# Patient Record
Sex: FEMALE | Race: WHITE | Employment: FULL TIME | URBAN - METROPOLITAN AREA
[De-identification: names, ages, dates, MRNs, and addresses within clinical notes are randomized per-mention and may not be internally consistent; named-entity substitution may affect disease eponyms.]

---

## 2017-01-02 ENCOUNTER — OFFICE VISIT (OUTPATIENT)
Dept: PHYSICAL THERAPY | Age: 32
End: 2017-01-02
Attending: FAMILY MEDICINE
Payer: MEDICAID

## 2017-01-02 DIAGNOSIS — M54.2 NECK PAIN: ICD-10-CM

## 2017-01-02 DIAGNOSIS — M54.50 LOW BACK PAIN AT MULTIPLE SITES: ICD-10-CM

## 2017-01-02 DIAGNOSIS — M62.830 MUSCLE SPASM OF BACK: ICD-10-CM

## 2017-01-02 DIAGNOSIS — S39.012A LOW BACK STRAIN, INITIAL ENCOUNTER: Primary | ICD-10-CM

## 2017-01-02 PROCEDURE — 97163 PT EVAL HIGH COMPLEX 45 MIN: CPT

## 2017-01-02 PROCEDURE — 97110 THERAPEUTIC EXERCISES: CPT

## 2017-01-02 NOTE — PROGRESS NOTES
SPINE EVALUATION:   Referring Physician: Dr. Emma Elizabeth  Diagnosis: Low Back Pain     Date of Service: 1/2/2017     PATIENT SUMMARY   Damaris Ahumada is a 33year old y/o female who presents to therapy today with complaints of occasional cervical and and lumbar spine. Experiences a contralateral pulling sensation for end range sidebending and rotation of both the cervical and lumbar motion. Accessory motion: Good cervical and lumbar motion. Mild loss of thoracic mobility with PA assessment. FAAOMPT

## 2017-01-04 ENCOUNTER — OFFICE VISIT (OUTPATIENT)
Dept: FAMILY MEDICINE CLINIC | Facility: CLINIC | Age: 32
End: 2017-01-04

## 2017-01-04 VITALS
DIASTOLIC BLOOD PRESSURE: 74 MMHG | SYSTOLIC BLOOD PRESSURE: 118 MMHG | RESPIRATION RATE: 16 BRPM | HEIGHT: 62 IN | WEIGHT: 213 LBS | BODY MASS INDEX: 39.2 KG/M2 | HEART RATE: 76 BPM | TEMPERATURE: 98 F

## 2017-01-04 DIAGNOSIS — B96.89 ACUTE BACTERIAL PHARYNGITIS: ICD-10-CM

## 2017-01-04 DIAGNOSIS — H92.03 EAR PAIN, BILATERAL: ICD-10-CM

## 2017-01-04 DIAGNOSIS — J02.8 ACUTE BACTERIAL PHARYNGITIS: ICD-10-CM

## 2017-01-04 DIAGNOSIS — J01.00 ACUTE NON-RECURRENT MAXILLARY SINUSITIS: Primary | ICD-10-CM

## 2017-01-04 PROCEDURE — 99213 OFFICE O/P EST LOW 20 MIN: CPT | Performed by: FAMILY MEDICINE

## 2017-01-04 RX ORDER — CIPROFLOXACIN 250 MG/1
250 TABLET, FILM COATED ORAL 2 TIMES DAILY
Qty: 20 TABLET | Refills: 0 | Status: SHIPPED | OUTPATIENT
Start: 2017-01-04 | End: 2017-01-14

## 2017-01-04 NOTE — PATIENT INSTRUCTIONS
Stacie Santos you were seen for acute sinus and throat infections--start the Cipro twice a day for ten days and use supportive products as well. Rest and hydration and treating other symptoms advised. See me if not better in 2 weeks.  Take care, Dr. Madhu Shah · Over-the-counter decongestants may be used unless a similar medicine was prescribed. Nasal sprays work the fastest. Use one that contains phenylephrine or oxymetazoline. First blow the nose gently. Then use the spray.  Do not use these medicines more ofte © 3163-2095 [address], [address] [place] [place]. All rights reserved. This information is not intended as a substitute for professional medical care. Always follow your healthcare professional's instructions.

## 2017-01-04 NOTE — PROGRESS NOTES
HPI:   Mindy Souza is a 33year old female who presents for sick visit with increasing upper respiratory symptoms for three days with very sore throat, PND, cough, sinus pressure and ear pains. No fevers. Here for evaluation and treatment.       C optic disk,conjunctiva are clear  HEENT: atraumatic, normocephalic, TMs not erythematous and bulging bilateral due to mild Eustachian tube dysfunction; exudative moderate pharyngeal erythema, halitosis, bilateral moderate maxillary sinus pressure on palpat

## 2017-01-09 ENCOUNTER — APPOINTMENT (OUTPATIENT)
Dept: PHYSICAL THERAPY | Age: 32
End: 2017-01-09
Attending: FAMILY MEDICINE
Payer: MEDICAID

## 2017-01-10 ENCOUNTER — APPOINTMENT (OUTPATIENT)
Dept: PHYSICAL THERAPY | Age: 32
End: 2017-01-10
Attending: FAMILY MEDICINE
Payer: MEDICAID

## 2017-01-13 ENCOUNTER — APPOINTMENT (OUTPATIENT)
Dept: PHYSICAL THERAPY | Age: 32
End: 2017-01-13
Attending: FAMILY MEDICINE
Payer: MEDICAID

## 2017-01-16 ENCOUNTER — OFFICE VISIT (OUTPATIENT)
Dept: PHYSICAL THERAPY | Age: 32
End: 2017-01-16
Attending: FAMILY MEDICINE
Payer: MEDICAID

## 2017-01-16 PROCEDURE — 97110 THERAPEUTIC EXERCISES: CPT

## 2017-01-16 NOTE — PROGRESS NOTES
Dx: Lumbar Strain         Authorized # of Visits:  7         Next MD visit: none scheduled  Fall Risk: standard         Precautions: n/a             Subjective: Feeling great.   Has returned to exercising 4-5 times a week with her  with mini

## 2017-01-17 ENCOUNTER — OFFICE VISIT (OUTPATIENT)
Dept: FAMILY MEDICINE CLINIC | Facility: CLINIC | Age: 32
End: 2017-01-17

## 2017-01-17 VITALS
TEMPERATURE: 99 F | BODY MASS INDEX: 38.83 KG/M2 | SYSTOLIC BLOOD PRESSURE: 116 MMHG | HEART RATE: 76 BPM | WEIGHT: 211 LBS | DIASTOLIC BLOOD PRESSURE: 78 MMHG | RESPIRATION RATE: 16 BRPM | HEIGHT: 62 IN

## 2017-01-17 DIAGNOSIS — J02.8 ACUTE BACTERIAL PHARYNGITIS: ICD-10-CM

## 2017-01-17 DIAGNOSIS — R42 VERTIGO: Primary | ICD-10-CM

## 2017-01-17 DIAGNOSIS — B96.89 ACUTE BACTERIAL PHARYNGITIS: ICD-10-CM

## 2017-01-17 DIAGNOSIS — Z09 FOLLOW UP: ICD-10-CM

## 2017-01-17 DIAGNOSIS — J30.89 SEASONAL ALLERGIC RHINITIS DUE TO OTHER ALLERGIC TRIGGER: ICD-10-CM

## 2017-01-17 DIAGNOSIS — J01.00 ACUTE NON-RECURRENT MAXILLARY SINUSITIS: ICD-10-CM

## 2017-01-17 DIAGNOSIS — R63.4 WEIGHT LOSS: ICD-10-CM

## 2017-01-17 PROCEDURE — 99213 OFFICE O/P EST LOW 20 MIN: CPT | Performed by: FAMILY MEDICINE

## 2017-01-17 NOTE — PATIENT INSTRUCTIONS
Jean Pierre Ceron you were seen for dizziness probably due to weight loss and allergies. Keep hydrated and stay on low salt. Get on plain Claritin or ALlegra or Zyrtec. Get labs done as well and our office will call you with results.   Take care, Dr. Shari Ponce Follow up with your healthcare provider or as directed. If you are referred to a specialist or for testing, make the appointment promptly.   When to seek medical advice  Call your healthcare provider if any of the following occur:  · Fever of 100.4°F (38ºC)

## 2017-01-17 NOTE — PROGRESS NOTES
Leonor returns for follow up on recently treated sinus infection and states she still feels tired and drained lately; had a few episodes of dizziness with driving; had some right ear pain stabbing yesterday and better today; admits she has had many episo had dizziness and some vertigo the past week  LUNGS: denies shortness of breath with exertion  CARDIOVASCULAR: denies chest pain on exertion  GI: no nausea or abdominal pain  NEURO: denies headaches--no migraines   PSYCHE: no anxiety     EXAM:   /78

## 2017-01-20 ENCOUNTER — LAB ENCOUNTER (OUTPATIENT)
Dept: LAB | Age: 32
End: 2017-01-20
Attending: FAMILY MEDICINE
Payer: MEDICAID

## 2017-01-20 DIAGNOSIS — R42 VERTIGO: ICD-10-CM

## 2017-01-20 LAB
ALBUMIN SERPL-MCNC: 4.3 G/DL (ref 3.5–4.8)
ALP LIVER SERPL-CCNC: 85 U/L (ref 37–98)
ALT SERPL-CCNC: 29 U/L (ref 14–54)
AST SERPL-CCNC: 23 U/L (ref 15–41)
BASOPHILS # BLD AUTO: 0.06 X10(3) UL (ref 0–0.1)
BASOPHILS NFR BLD AUTO: 0.6 %
BILIRUB SERPL-MCNC: 0.7 MG/DL (ref 0.1–2)
BUN BLD-MCNC: 10 MG/DL (ref 8–20)
CALCIUM BLD-MCNC: 9.6 MG/DL (ref 8.3–10.3)
CHLORIDE: 109 MMOL/L (ref 101–111)
CO2: 24 MMOL/L (ref 22–32)
CREAT BLD-MCNC: 0.85 MG/DL (ref 0.55–1.02)
EOSINOPHIL # BLD AUTO: 0.2 X10(3) UL (ref 0–0.3)
EOSINOPHIL NFR BLD AUTO: 2.1 %
ERYTHROCYTE [DISTWIDTH] IN BLOOD BY AUTOMATED COUNT: 12.8 % (ref 11.5–16)
FREE T4: 1.4 NG/DL (ref 0.9–1.8)
GLUCOSE BLD-MCNC: 90 MG/DL (ref 70–99)
HCT VFR BLD AUTO: 42.5 % (ref 34–50)
HGB BLD-MCNC: 15.1 G/DL (ref 12–16)
IMMATURE GRANULOCYTE COUNT: 0.02 X10(3) UL (ref 0–1)
IMMATURE GRANULOCYTE RATIO %: 0.2 %
LYMPHOCYTES # BLD AUTO: 1.94 X10(3) UL (ref 0.9–4)
LYMPHOCYTES NFR BLD AUTO: 20.4 %
M PROTEIN MFR SERPL ELPH: 7.4 G/DL (ref 6.1–8.3)
MCH RBC QN AUTO: 28.2 PG (ref 27–33.2)
MCHC RBC AUTO-ENTMCNC: 35.5 G/DL (ref 31–37)
MCV RBC AUTO: 79.3 FL (ref 81–100)
MONOCYTES # BLD AUTO: 0.5 X10(3) UL (ref 0.1–0.6)
MONOCYTES NFR BLD AUTO: 5.3 %
NEUTROPHIL ABS PRELIM: 6.8 X10 (3) UL (ref 1.3–6.7)
NEUTROPHILS # BLD AUTO: 6.8 X10(3) UL (ref 1.3–6.7)
NEUTROPHILS NFR BLD AUTO: 71.4 %
PLATELET # BLD AUTO: 286 10(3)UL (ref 150–450)
POTASSIUM SERPL-SCNC: 3.6 MMOL/L (ref 3.6–5.1)
RBC # BLD AUTO: 5.36 X10(6)UL (ref 3.8–5.1)
RED CELL DISTRIBUTION WIDTH-SD: 36.4 FL (ref 35.1–46.3)
SODIUM SERPL-SCNC: 140 MMOL/L (ref 136–144)
TSI SER-ACNC: 1.88 MIU/ML (ref 0.35–5.5)
WBC # BLD AUTO: 9.5 X10(3) UL (ref 4–13)

## 2017-01-20 PROCEDURE — 84439 ASSAY OF FREE THYROXINE: CPT

## 2017-01-20 PROCEDURE — 85025 COMPLETE CBC W/AUTO DIFF WBC: CPT

## 2017-01-20 PROCEDURE — 36415 COLL VENOUS BLD VENIPUNCTURE: CPT

## 2017-01-20 PROCEDURE — 80053 COMPREHEN METABOLIC PANEL: CPT

## 2017-01-20 PROCEDURE — 84443 ASSAY THYROID STIM HORMONE: CPT

## 2017-01-20 NOTE — PROGRESS NOTES
Quick Note:    Please call pt with normal results for her CBC and CMP and thyroid labs ordered to assess her dizziness--her symptoms are likely due to allergies and weight loss--advise she treat allergies and stay hydrated as discussed at [address]--- Dr. Herminia La

## 2017-01-23 ENCOUNTER — APPOINTMENT (OUTPATIENT)
Dept: PHYSICAL THERAPY | Age: 32
End: 2017-01-23
Attending: FAMILY MEDICINE
Payer: MEDICAID

## 2017-01-25 ENCOUNTER — TELEPHONE (OUTPATIENT)
Dept: FAMILY MEDICINE CLINIC | Facility: CLINIC | Age: 32
End: 2017-01-25

## 2017-01-25 NOTE — TELEPHONE ENCOUNTER
Patient given normal results-read message from Dr. Rudy Kasper to patient. Patient verbalized understanding.

## 2017-02-06 ENCOUNTER — OFFICE VISIT (OUTPATIENT)
Dept: FAMILY MEDICINE CLINIC | Facility: CLINIC | Age: 32
End: 2017-02-06

## 2017-02-06 VITALS
HEART RATE: 103 BPM | BODY MASS INDEX: 38 KG/M2 | OXYGEN SATURATION: 99 % | WEIGHT: 208 LBS | SYSTOLIC BLOOD PRESSURE: 118 MMHG | RESPIRATION RATE: 14 BRPM | TEMPERATURE: 98 F | DIASTOLIC BLOOD PRESSURE: 60 MMHG

## 2017-02-06 DIAGNOSIS — H60.501 ACUTE OTITIS EXTERNA OF RIGHT EAR, UNSPECIFIED TYPE: ICD-10-CM

## 2017-02-06 DIAGNOSIS — R42 DIZZINESS: Primary | ICD-10-CM

## 2017-02-06 PROCEDURE — 99214 OFFICE O/P EST MOD 30 MIN: CPT | Performed by: FAMILY MEDICINE

## 2017-02-06 NOTE — PROGRESS NOTES
HPI:    Patient ID: Marybeth Moody is a 33year old female. HPI  Patient is here with a sense of fullness in the right ear and having some issues with dizziness off and on for the past several days. No cold symptoms. No ringing in the ears.   No diagnosis)  Acute otitis externa of right ear, unspecified type    Trial of Cortisporin Otic solution to the right ear and left ear if needed. Trial of Sudafed twice a day for dizziness or lightheadedness as needed. Follow-up in 3 days.     No orders of t

## 2017-02-09 ENCOUNTER — OFFICE VISIT (OUTPATIENT)
Dept: FAMILY MEDICINE CLINIC | Facility: CLINIC | Age: 32
End: 2017-02-09

## 2017-02-09 VITALS
HEIGHT: 62 IN | BODY MASS INDEX: 38.28 KG/M2 | DIASTOLIC BLOOD PRESSURE: 60 MMHG | SYSTOLIC BLOOD PRESSURE: 118 MMHG | WEIGHT: 208 LBS | TEMPERATURE: 98 F

## 2017-02-09 DIAGNOSIS — R42 DIZZINESS: Primary | ICD-10-CM

## 2017-02-09 DIAGNOSIS — H60.501 ACUTE OTITIS EXTERNA OF RIGHT EAR, UNSPECIFIED TYPE: ICD-10-CM

## 2017-02-09 PROCEDURE — 99214 OFFICE O/P EST MOD 30 MIN: CPT | Performed by: FAMILY MEDICINE

## 2017-02-09 RX ORDER — AZITHROMYCIN 250 MG/1
TABLET, FILM COATED ORAL
Qty: 6 TABLET | Refills: 0 | Status: SHIPPED | OUTPATIENT
Start: 2017-02-09 | End: 2017-02-18

## 2017-02-09 NOTE — PROGRESS NOTES
HPI:    Patient ID: Katarzyna Lawrence is a 33year old female. HPI  Patient is here for follow-up of right ear pain and dizziness. She is not much better. She has taken eardrops for the past 2 days.   He does have some shooting pain in the right ea and oriented to time, space, and person, cranial nerves two through twelve grossly intact, two plus deep tendon reflexes in all four extremities, gait is normal, negative Romberg sign, coordination with finger to nose and heel to shin intact.  No focal neur

## 2017-02-14 ENCOUNTER — HOSPITAL ENCOUNTER (EMERGENCY)
Age: 32
Discharge: HOME | End: 2017-02-14
Attending: EMERGENCY MEDICINE
Payer: MEDICAID

## 2017-02-14 VITALS
SYSTOLIC BLOOD PRESSURE: 112 MMHG | BODY MASS INDEX: 37.91 KG/M2 | HEIGHT: 62 IN | TEMPERATURE: 98 F | RESPIRATION RATE: 16 BRPM | OXYGEN SATURATION: 97 % | HEART RATE: 80 BPM | DIASTOLIC BLOOD PRESSURE: 72 MMHG | WEIGHT: 206 LBS

## 2017-02-14 DIAGNOSIS — N93.9 VAGINAL BLEEDING: Primary | ICD-10-CM

## 2017-02-14 LAB
BILIRUB UR QL STRIP.AUTO: NEGATIVE
CLARITY UR REFRACT.AUTO: CLEAR
COLOR UR AUTO: YELLOW
GLUCOSE UR STRIP.AUTO-MCNC: NEGATIVE MG/DL
KETONES UR STRIP.AUTO-MCNC: NEGATIVE MG/DL
LEUKOCYTE ESTERASE UR QL STRIP.AUTO: NEGATIVE
NITRITE UR QL STRIP.AUTO: NEGATIVE
PH UR STRIP.AUTO: 5.5 [PH] (ref 4.5–8)
POCT LOT NUMBER: NORMAL
POCT URINE PREGNANCY: NEGATIVE
PROT UR STRIP.AUTO-MCNC: NEGATIVE MG/DL
SP GR UR STRIP.AUTO: 1.01 (ref 1–1.03)
UROBILINOGEN UR STRIP.AUTO-MCNC: 0.2 MG/DL

## 2017-02-14 PROCEDURE — 99283 EMERGENCY DEPT VISIT LOW MDM: CPT

## 2017-02-14 PROCEDURE — 81001 URINALYSIS AUTO W/SCOPE: CPT | Performed by: EMERGENCY MEDICINE

## 2017-02-14 PROCEDURE — 81025 URINE PREGNANCY TEST: CPT

## 2017-02-14 RX ORDER — IBUPROFEN 600 MG/1
600 TABLET ORAL ONCE
Status: COMPLETED | OUTPATIENT
Start: 2017-02-14 | End: 2017-02-14

## 2017-02-14 RX ORDER — IBUPROFEN 600 MG/1
600 TABLET ORAL EVERY 8 HOURS PRN
Qty: 30 TABLET | Refills: 0 | Status: SHIPPED | OUTPATIENT
Start: 2017-02-14 | End: 2017-02-24

## 2017-02-14 NOTE — ED PROVIDER NOTES
Patient Seen in: [facility] [place]    History   Patient presents with:  Eval-G (gynecologic)    Stated Complaint: GOT DEPO SHOT THIS AM, SINCE THEN WITH VAG BLEEDING AND BACK PAIN    HPI    58-year-old female presents emergency de SINCE THEN WITH VAG BLEEDING AND BACK PAIN  Other systems are as noted in HPI. Constitutional and vital signs reviewed. All other systems reviewed and negative except as noted above.     PSFH elements reviewed from today and agreed except as otherwise She has been told that she needs to go to an urgent care or her primary for these complaints but she continues to come to this ER. Told her we will go ahead and check a UA and a pregnancy. We will give her some ibuprofen.   Do not feel this warrants emerg

## 2017-02-14 NOTE — ED INITIAL ASSESSMENT (HPI)
Pt states today she received depo shot and about 45mins later began having heavy vag bleeding with nausea, abd and back pain.

## 2017-02-15 NOTE — ED NOTES
Upon discharging pt, pt states to RN, \"I am very disappointing in the care I got today. You're in here telling me I'm going home, and I have received nothing for my pain. Just ibuprofen, and I could have taken that at home.  My OB sent me here for tests, a

## 2017-02-16 ENCOUNTER — TELEPHONE (OUTPATIENT)
Dept: FAMILY MEDICINE CLINIC | Facility: CLINIC | Age: 32
End: 2017-02-16

## 2017-02-16 ENCOUNTER — OFFICE VISIT (OUTPATIENT)
Dept: FAMILY MEDICINE CLINIC | Facility: CLINIC | Age: 32
End: 2017-02-16

## 2017-02-16 VITALS
DIASTOLIC BLOOD PRESSURE: 60 MMHG | OXYGEN SATURATION: 99 % | TEMPERATURE: 98 F | RESPIRATION RATE: 12 BRPM | HEART RATE: 98 BPM | SYSTOLIC BLOOD PRESSURE: 112 MMHG

## 2017-02-16 DIAGNOSIS — H92.03 OTALGIA, BILATERAL: Primary | ICD-10-CM

## 2017-02-16 PROCEDURE — 99213 OFFICE O/P EST LOW 20 MIN: CPT | Performed by: FAMILY MEDICINE

## 2017-02-17 ENCOUNTER — TELEPHONE (OUTPATIENT)
Dept: FAMILY MEDICINE CLINIC | Facility: CLINIC | Age: 32
End: 2017-02-17

## 2017-02-17 NOTE — TELEPHONE ENCOUNTER
Pt informed she needs to come in to see dr. Haresh Schroeder to get evaluated for abdominal pain and low back pain. Pt instructed to not go to ER unless it is an absolute emergency. appt scheduled for tomorrow.   Future Appointments  Date Time Provider Department Chen

## 2017-02-18 ENCOUNTER — OFFICE VISIT (OUTPATIENT)
Dept: FAMILY MEDICINE CLINIC | Facility: CLINIC | Age: 32
End: 2017-02-18

## 2017-02-18 VITALS
DIASTOLIC BLOOD PRESSURE: 60 MMHG | WEIGHT: 204.5 LBS | HEART RATE: 95 BPM | BODY MASS INDEX: 37 KG/M2 | OXYGEN SATURATION: 99 % | TEMPERATURE: 98 F | SYSTOLIC BLOOD PRESSURE: 122 MMHG | RESPIRATION RATE: 12 BRPM

## 2017-02-18 DIAGNOSIS — R10.2 PELVIC PAIN: Primary | ICD-10-CM

## 2017-02-18 DIAGNOSIS — R10.84 GENERALIZED ABDOMINAL PAIN: ICD-10-CM

## 2017-02-18 DIAGNOSIS — J34.89 SINUS PRESSURE: ICD-10-CM

## 2017-02-18 PROCEDURE — 99214 OFFICE O/P EST MOD 30 MIN: CPT | Performed by: FAMILY MEDICINE

## 2017-02-18 RX ORDER — ONDANSETRON 4 MG/1
4 TABLET, FILM COATED ORAL 3 TIMES DAILY PRN
Qty: 10 TABLET | Refills: 0 | Status: SHIPPED | OUTPATIENT
Start: 2017-02-18 | End: 2017-05-23

## 2017-02-18 RX ORDER — ESOMEPRAZOLE MAGNESIUM 40 MG/1
40 CAPSULE, DELAYED RELEASE ORAL DAILY
Qty: 30 CAPSULE | Refills: 0 | Status: SHIPPED | OUTPATIENT
Start: 2017-02-18 | End: 2017-05-23

## 2017-02-18 NOTE — PROGRESS NOTES
HPI:    Patient ID: Nallely Maldonado is a 33year old female. HPI  Had some mild sinus pressure, ear congestion since the last several days. Had depo provera 5 days ago, then developed excess vaginal bleeding, a lot of nausea.    Pt had been on it prescriptions requested or ordered in this encounter    Imaging & Referrals:  None       BX#[id number]

## 2017-02-18 NOTE — PROGRESS NOTES
HPI:    Patient ID: Brandon Garces is a 33year old female. HPI  Patient presents with:  Ear Problem: follow up on fluid in ears    Patient states that her ears are getting somewhat better.   Review of Systems  Negative except for the above This Visit:  No prescriptions requested or ordered in this encounter    Imaging & Referrals:  None       #[id number]

## 2017-02-21 ENCOUNTER — HOSPITAL ENCOUNTER (OUTPATIENT)
Dept: ULTRASOUND IMAGING | Age: 32
Discharge: HOME | End: 2017-02-21
Attending: FAMILY MEDICINE
Payer: MEDICAID

## 2017-02-21 ENCOUNTER — TELEPHONE (OUTPATIENT)
Dept: FAMILY MEDICINE CLINIC | Facility: CLINIC | Age: 32
End: 2017-02-21

## 2017-02-21 DIAGNOSIS — R10.84 GENERALIZED ABDOMINAL PAIN: ICD-10-CM

## 2017-02-21 DIAGNOSIS — R10.2 PELVIC PAIN: ICD-10-CM

## 2017-02-21 PROCEDURE — 76830 TRANSVAGINAL US NON-OB: CPT

## 2017-02-21 PROCEDURE — 76700 US EXAM ABDOM COMPLETE: CPT

## 2017-02-21 PROCEDURE — 76856 US EXAM PELVIC COMPLETE: CPT

## 2017-02-22 NOTE — TELEPHONE ENCOUNTER
Pt informed pa denied. She may take nexium 20mg OTC. Pt verbalized stating she cannot afford to \"spend her money\" on OTC meds.   She will contact insurance regarding denial.

## 2017-02-22 NOTE — TELEPHONE ENCOUNTER
PA request for nexium denied. Medication is available OTC or patient can try preferred drug: Nexium 20mg (QD), protonix, prilosec tab  Left detailed message on pt's vm letting her know the above information.   Please advise on other alternative medication

## 2017-02-24 ENCOUNTER — OFFICE VISIT (OUTPATIENT)
Dept: FAMILY MEDICINE CLINIC | Facility: CLINIC | Age: 32
End: 2017-02-24

## 2017-02-24 VITALS
OXYGEN SATURATION: 99 % | SYSTOLIC BLOOD PRESSURE: 102 MMHG | DIASTOLIC BLOOD PRESSURE: 60 MMHG | HEIGHT: 62 IN | WEIGHT: 204 LBS | HEART RATE: 88 BPM | BODY MASS INDEX: 37.54 KG/M2 | TEMPERATURE: 98 F | RESPIRATION RATE: 12 BRPM

## 2017-02-24 DIAGNOSIS — N83.201 CYST OF RIGHT OVARY: ICD-10-CM

## 2017-02-24 DIAGNOSIS — J01.10 ACUTE NON-RECURRENT FRONTAL SINUSITIS: Primary | ICD-10-CM

## 2017-02-24 DIAGNOSIS — K76.0 FATTY LIVER: ICD-10-CM

## 2017-02-24 DIAGNOSIS — G43.701 CHRONIC MIGRAINE WITHOUT AURA WITH STATUS MIGRAINOSUS, NOT INTRACTABLE: ICD-10-CM

## 2017-02-24 DIAGNOSIS — R11.0 NAUSEA: ICD-10-CM

## 2017-02-24 PROCEDURE — 99214 OFFICE O/P EST MOD 30 MIN: CPT | Performed by: FAMILY MEDICINE

## 2017-02-24 RX ORDER — BUTALBITAL, ACETAMINOPHEN AND CAFFEINE 50; 325; 40 MG/1; MG/1; MG/1
1 TABLET ORAL 3 TIMES DAILY PRN
Qty: 20 TABLET | Refills: 0 | Status: SHIPPED | OUTPATIENT
Start: 2017-02-24 | End: 2017-04-03

## 2017-02-24 RX ORDER — CIPROFLOXACIN 500 MG/1
500 TABLET, FILM COATED ORAL 2 TIMES DAILY
Qty: 14 TABLET | Refills: 0 | Status: SHIPPED | OUTPATIENT
Start: 2017-02-24 | End: 2017-03-02

## 2017-02-27 NOTE — PROGRESS NOTES
HPI:    Patient ID: Tulio oRbles is a 33year old female. HPI  Patient presents with:  Ear Pain: follow up  Lab Results  Test Results   patient is here for follow-up of test results.   She also complains of having sinus pain pressure congestion non-distended, no hepatosplenomegaly, no abnormal aortic pulsation.    NEURO: strength 5/5 all four extremities, sensation intact in all four extremities, cranial nerves II - XII grossly intact, negative Romberg sign, no facial weakness noted, coordination

## 2017-03-02 ENCOUNTER — OFFICE VISIT (OUTPATIENT)
Dept: FAMILY MEDICINE CLINIC | Facility: CLINIC | Age: 32
End: 2017-03-02

## 2017-03-02 VITALS
WEIGHT: 204 LBS | RESPIRATION RATE: 12 BRPM | OXYGEN SATURATION: 99 % | TEMPERATURE: 98 F | SYSTOLIC BLOOD PRESSURE: 114 MMHG | DIASTOLIC BLOOD PRESSURE: 68 MMHG | HEART RATE: 78 BPM | BODY MASS INDEX: 37 KG/M2

## 2017-03-02 DIAGNOSIS — G43.701 CHRONIC MIGRAINE WITHOUT AURA WITH STATUS MIGRAINOSUS, NOT INTRACTABLE: Primary | ICD-10-CM

## 2017-03-02 DIAGNOSIS — J01.00 ACUTE NON-RECURRENT MAXILLARY SINUSITIS: ICD-10-CM

## 2017-03-02 PROCEDURE — 99214 OFFICE O/P EST MOD 30 MIN: CPT | Performed by: FAMILY MEDICINE

## 2017-03-02 NOTE — PROGRESS NOTES
HPI:    Patient ID: Brie Rea is a 33year old female. HPI  Patient is here for follow-up of sinusitis and chronic migraine headache. She has used Fioricet about 3 times and it does seem to help with her migraine headache.   She thinks she i abnormal aortic pulsation. MS:  No paraspinal or spinal tenderness, negative straight leg raise bilaterally, no joint swelling or tenderness, normal strength, range of motion and sensation in all four extremities.   Neurologic:  alert and oriented to time,

## 2017-03-10 ENCOUNTER — OFFICE VISIT (OUTPATIENT)
Dept: FAMILY MEDICINE CLINIC | Facility: CLINIC | Age: 32
End: 2017-03-10

## 2017-03-10 VITALS
SYSTOLIC BLOOD PRESSURE: 118 MMHG | HEART RATE: 84 BPM | OXYGEN SATURATION: 99 % | RESPIRATION RATE: 12 BRPM | DIASTOLIC BLOOD PRESSURE: 60 MMHG | TEMPERATURE: 98 F

## 2017-03-10 DIAGNOSIS — F41.9 ANXIETY: ICD-10-CM

## 2017-03-10 DIAGNOSIS — F32.A DEPRESSION, UNSPECIFIED DEPRESSION TYPE: Primary | ICD-10-CM

## 2017-03-10 DIAGNOSIS — H60.501 ACUTE OTITIS EXTERNA OF RIGHT EAR, UNSPECIFIED TYPE: ICD-10-CM

## 2017-03-10 PROCEDURE — 99214 OFFICE O/P EST MOD 30 MIN: CPT | Performed by: FAMILY MEDICINE

## 2017-03-10 RX ORDER — FLUOXETINE HYDROCHLORIDE 20 MG/1
20 CAPSULE ORAL DAILY
Qty: 30 CAPSULE | Refills: 1 | Status: SHIPPED | OUTPATIENT
Start: 2017-03-10 | End: 2017-05-23 | Stop reason: DRUGHIGH

## 2017-03-12 PROBLEM — F32.A DEPRESSION: Status: ACTIVE | Noted: 2017-03-12

## 2017-03-12 PROBLEM — F41.9 ANXIETY: Status: ACTIVE | Noted: 2017-03-12

## 2017-03-12 NOTE — PROGRESS NOTES
HPI:    Patient ID: Nhung Sandoval is a 33year old female. HPI  Patient is here complaining of some right ear discomfort off and on for the past several days or more. She also is here for evaluation for depression and anxiety.   She is under a l use, no chest asymmetry, normal excursion. GI:  bowel sound positive in all four quadrants, abdomen is soft, non-tender, non-distended, no hepatosplenomegaly, no abnormal aortic pulsation.    PSYCH: Mood and affect are normal.  Judgement and insight are in

## 2017-03-24 ENCOUNTER — OFFICE VISIT (OUTPATIENT)
Dept: FAMILY MEDICINE CLINIC | Facility: CLINIC | Age: 32
End: 2017-03-24

## 2017-03-24 VITALS
RESPIRATION RATE: 12 BRPM | DIASTOLIC BLOOD PRESSURE: 62 MMHG | HEART RATE: 90 BPM | TEMPERATURE: 98 F | OXYGEN SATURATION: 98 % | SYSTOLIC BLOOD PRESSURE: 112 MMHG

## 2017-03-24 DIAGNOSIS — F41.9 ANXIETY: ICD-10-CM

## 2017-03-24 DIAGNOSIS — F32.A DEPRESSION, UNSPECIFIED DEPRESSION TYPE: Primary | ICD-10-CM

## 2017-03-24 DIAGNOSIS — J30.9 ALLERGIC RHINITIS, UNSPECIFIED ALLERGIC RHINITIS TRIGGER, UNSPECIFIED RHINITIS SEASONALITY: ICD-10-CM

## 2017-03-24 PROCEDURE — 99214 OFFICE O/P EST MOD 30 MIN: CPT | Performed by: FAMILY MEDICINE

## 2017-04-07 ENCOUNTER — OFFICE VISIT (OUTPATIENT)
Dept: FAMILY MEDICINE CLINIC | Facility: CLINIC | Age: 32
End: 2017-04-07

## 2017-04-07 VITALS
HEART RATE: 80 BPM | RESPIRATION RATE: 12 BRPM | TEMPERATURE: 98 F | SYSTOLIC BLOOD PRESSURE: 118 MMHG | BODY MASS INDEX: 37 KG/M2 | DIASTOLIC BLOOD PRESSURE: 78 MMHG | OXYGEN SATURATION: 98 % | WEIGHT: 204 LBS

## 2017-04-07 DIAGNOSIS — J01.00 ACUTE NON-RECURRENT MAXILLARY SINUSITIS: ICD-10-CM

## 2017-04-07 DIAGNOSIS — R05.8 PRODUCTIVE COUGH: Primary | ICD-10-CM

## 2017-04-07 PROCEDURE — 99213 OFFICE O/P EST LOW 20 MIN: CPT | Performed by: FAMILY MEDICINE

## 2017-04-07 RX ORDER — AZITHROMYCIN 250 MG/1
TABLET, FILM COATED ORAL
Qty: 6 TABLET | Refills: 0 | Status: SHIPPED | OUTPATIENT
Start: 2017-04-07 | End: 2017-04-25

## 2017-04-07 RX ORDER — METHYLPREDNISOLONE 4 MG/1
TABLET ORAL
Qty: 1 KIT | Refills: 0 | Status: SHIPPED | OUTPATIENT
Start: 2017-04-07 | End: 2017-05-23

## 2017-04-10 ENCOUNTER — TELEPHONE (OUTPATIENT)
Dept: FAMILY MEDICINE CLINIC | Facility: CLINIC | Age: 32
End: 2017-04-10

## 2017-04-12 ENCOUNTER — HOSPITAL ENCOUNTER (EMERGENCY)
Age: 32
Discharge: HOME | End: 2017-04-12
Payer: MEDICAID

## 2017-04-12 ENCOUNTER — TELEPHONE (OUTPATIENT)
Dept: FAMILY MEDICINE CLINIC | Facility: CLINIC | Age: 32
End: 2017-04-12

## 2017-04-12 VITALS
WEIGHT: 199 LBS | OXYGEN SATURATION: 97 % | SYSTOLIC BLOOD PRESSURE: 140 MMHG | RESPIRATION RATE: 18 BRPM | TEMPERATURE: 98 F | DIASTOLIC BLOOD PRESSURE: 74 MMHG | HEIGHT: 62 IN | HEART RATE: 83 BPM | BODY MASS INDEX: 36.62 KG/M2

## 2017-04-12 DIAGNOSIS — M75.82 ROTATOR CUFF TENDINITIS, LEFT: Primary | ICD-10-CM

## 2017-04-12 PROCEDURE — 99283 EMERGENCY DEPT VISIT LOW MDM: CPT

## 2017-04-12 RX ORDER — NAPROXEN 500 MG/1
500 TABLET ORAL 2 TIMES DAILY PRN
Qty: 20 TABLET | Refills: 0 | Status: SHIPPED | OUTPATIENT
Start: 2017-04-12 | End: 2017-04-19

## 2017-04-12 NOTE — ED INITIAL ASSESSMENT (HPI)
Pt co left neck pain down left shoulder after working out at gym yesterday. \"fire like pain\". Spoke to dr Mukherjee nurse and recommended to go to er.

## 2017-04-12 NOTE — TELEPHONE ENCOUNTER
I advised patient to go to the  were they can do X-rays if needed.  I gave her the address to [facility] on [address]

## 2017-04-16 NOTE — PROGRESS NOTES
HPI:    Patient ID: Ifeoma Hoffman is a 33year old female. HPI  Patient is here with complaint of sinus pressure pain congestion and cough and congestion for the past 1 week or more.   Review of Systems  Negative except for the above       Curren muscle use, no chest asymmetry, normal excursion. ASSESSMENT/PLAN:   Productive cough  (primary encounter diagnosis)  Acute non-recurrent maxillary sinusitis  Z-Shorty, Medrol Dosepak, Tylenol fluids rest.  RTC 1 week. .  No orders of the defined typ

## 2017-04-18 ENCOUNTER — OFFICE VISIT (OUTPATIENT)
Dept: FAMILY MEDICINE CLINIC | Facility: CLINIC | Age: 32
End: 2017-04-18

## 2017-04-18 VITALS
SYSTOLIC BLOOD PRESSURE: 120 MMHG | DIASTOLIC BLOOD PRESSURE: 60 MMHG | RESPIRATION RATE: 12 BRPM | TEMPERATURE: 98 F | OXYGEN SATURATION: 98 % | HEART RATE: 83 BPM

## 2017-04-18 DIAGNOSIS — S46.912D SHOULDER STRAIN, LEFT, SUBSEQUENT ENCOUNTER: ICD-10-CM

## 2017-04-18 DIAGNOSIS — J01.00 ACUTE NON-RECURRENT MAXILLARY SINUSITIS: Primary | ICD-10-CM

## 2017-04-18 PROCEDURE — 99214 OFFICE O/P EST MOD 30 MIN: CPT | Performed by: FAMILY MEDICINE

## 2017-04-18 RX ORDER — SULFAMETHOXAZOLE AND TRIMETHOPRIM 800; 160 MG/1; MG/1
1 TABLET ORAL 2 TIMES DAILY
Qty: 20 TABLET | Refills: 0 | Status: SHIPPED | OUTPATIENT
Start: 2017-04-18 | End: 2017-04-28

## 2017-04-18 NOTE — PROGRESS NOTES
HPI:    Patient ID: Saturnino Maria is a 33year old female. HPI  She is here for follow-up of ER visit after having a left shoulder injury. She was lifting weights and noticed a sharp pain in the outer part of her left shoulder.   She was evaluat non-erythematous, nasal turbinates are non-inflamed and not swollen, oropharynx without erythema, swelling, or exudate, gingiva and lips without any apparent lesions.   Positive maxillary and frontal sinus tenderness  Cardiac:  S1 S2 regular rate and rhythm

## 2017-04-24 ENCOUNTER — TELEPHONE (OUTPATIENT)
Dept: FAMILY MEDICINE CLINIC | Facility: CLINIC | Age: 32
End: 2017-04-24

## 2017-04-24 ENCOUNTER — HOSPITAL ENCOUNTER (OUTPATIENT)
Dept: ULTRASOUND IMAGING | Age: 32
Discharge: HOME | End: 2017-04-24
Attending: FAMILY MEDICINE
Payer: MEDICAID

## 2017-04-24 DIAGNOSIS — N83.201 CYST OF RIGHT OVARY: Primary | ICD-10-CM

## 2017-04-24 DIAGNOSIS — R10.2 PELVIC PAIN: ICD-10-CM

## 2017-04-24 DIAGNOSIS — N83.201 CYST OF RIGHT OVARY: ICD-10-CM

## 2017-04-24 PROCEDURE — 76830 TRANSVAGINAL US NON-OB: CPT

## 2017-04-24 PROCEDURE — 76856 US EXAM PELVIC COMPLETE: CPT

## 2017-04-24 NOTE — TELEPHONE ENCOUNTER
Please call patient to have her call central scheduling to schedule pelvic us to check on ovarian cyst

## 2017-04-24 NOTE — TELEPHONE ENCOUNTER
Pt states she spoke with dr. Marlin Knowles last night and was told to take ibuprofen which did not help. Pt did call ob/gyne, they are not willing to help patient.  They directed her to PCP  Please advise

## 2017-04-25 ENCOUNTER — TELEPHONE (OUTPATIENT)
Dept: FAMILY MEDICINE CLINIC | Facility: CLINIC | Age: 32
End: 2017-04-25

## 2017-04-25 ENCOUNTER — OFFICE VISIT (OUTPATIENT)
Dept: FAMILY MEDICINE CLINIC | Facility: CLINIC | Age: 32
End: 2017-04-25

## 2017-04-25 VITALS — SYSTOLIC BLOOD PRESSURE: 120 MMHG | TEMPERATURE: 98 F | DIASTOLIC BLOOD PRESSURE: 60 MMHG

## 2017-04-25 DIAGNOSIS — T78.40XA ALLERGIC REACTION, INITIAL ENCOUNTER: Primary | ICD-10-CM

## 2017-04-25 DIAGNOSIS — R10.9 ABDOMINAL PAIN, UNSPECIFIED LOCATION: ICD-10-CM

## 2017-04-25 PROCEDURE — 99214 OFFICE O/P EST MOD 30 MIN: CPT | Performed by: FAMILY MEDICINE

## 2017-04-25 RX ORDER — PREDNISONE 20 MG/1
20 TABLET ORAL 2 TIMES DAILY
Qty: 10 TABLET | Refills: 0 | Status: SHIPPED | OUTPATIENT
Start: 2017-04-25 | End: 2017-04-30

## 2017-04-25 NOTE — TELEPHONE ENCOUNTER
Patient is calling that she feels like something is in her throat, she is coughing, it feels like something is stuck in her throat. She has no swelling of the lips or face.   She said Dr Bam Shah told her she was having a allergic reaction to the Bactrim and

## 2017-04-25 NOTE — TELEPHONE ENCOUNTER
She developed a rash this morning and  She was told that she was having an allergic reaction to an antibiotic. Pt. States she now feels like she has something stuck in her throat and  would like to if this is something to worry about.

## 2017-04-25 NOTE — TELEPHONE ENCOUNTER
Patient is calling stating she is on Bactrim and was out in the sun yesterday and today she has a red itchy rash to her elbows down to her hands and to her feet.  Everyplace that was exposed to the sun, she is scratching it and cannot stop, she wants to com

## 2017-04-27 ENCOUNTER — TELEPHONE (OUTPATIENT)
Dept: FAMILY MEDICINE CLINIC | Facility: CLINIC | Age: 32
End: 2017-04-27

## 2017-04-27 NOTE — TELEPHONE ENCOUNTER
Patient would like to know if she can take one more of her anxiety/depression medication. She states that she \"is on the edge of flipping out\".

## 2017-04-28 NOTE — PROGRESS NOTES
HPI:    Patient ID: Gwendolynn Divers is a 33year old female. HPI  Patient is here for follow-up of pelvic ultrasound abdominal pain and menstrual bleeding. Patient is on Depo-Provera injection and normally she gets.   1 week before her next inject swelling, or exudate, gingiva and lips without any apparent lesions.    Cardiac:  S1 S2 regular rate and rhythm, no murmur, gallop, or rub  Respiratory:  Bilaterally clear to auscultation, no chest tenderness to palpation, no wheezing, no rhonchi, no rales,

## 2017-04-29 NOTE — TELEPHONE ENCOUNTER
LMOM letting pt know that she can increase her prozac to 2 pills per day and make a follow up in 3 weeks.     Future Appointments  Date Time Provider Harriet Bell   5/2/2017 11:45 AM Katiuska Naranjo MD [facility] [address] [facility] [address]

## 2017-05-02 ENCOUNTER — OFFICE VISIT (OUTPATIENT)
Dept: FAMILY MEDICINE CLINIC | Facility: CLINIC | Age: 32
End: 2017-05-02

## 2017-05-02 VITALS
BODY MASS INDEX: 38 KG/M2 | RESPIRATION RATE: 14 BRPM | WEIGHT: 207 LBS | SYSTOLIC BLOOD PRESSURE: 128 MMHG | HEART RATE: 108 BPM | DIASTOLIC BLOOD PRESSURE: 78 MMHG | OXYGEN SATURATION: 100 %

## 2017-05-02 DIAGNOSIS — T78.40XD ALLERGIC REACTION, SUBSEQUENT ENCOUNTER: Primary | ICD-10-CM

## 2017-05-02 DIAGNOSIS — R09.81 SINUS CONGESTION: ICD-10-CM

## 2017-05-02 PROCEDURE — 99214 OFFICE O/P EST MOD 30 MIN: CPT | Performed by: FAMILY MEDICINE

## 2017-05-02 NOTE — PROGRESS NOTES
HPI:    Patient ID: Nicole Moreno is a 33year old female. HPI  Patient is here for follow-up of rash. It has resolved. No further rash or itching. He does have sinus pressure.   Review of Systems  Negative except for the above         Current No rash noted anywhere on the body          ASSESSMENT/PLAN:   Allergic reaction, subsequent encounter  (primary encounter diagnosis)  Sinus congestion  Patient does have some ear effusion. Recommend Sudafed twice a day for 1 week.   Allergy medicines if n

## 2017-05-05 ENCOUNTER — TELEPHONE (OUTPATIENT)
Dept: FAMILY MEDICINE CLINIC | Facility: CLINIC | Age: 32
End: 2017-05-05

## 2017-05-05 NOTE — TELEPHONE ENCOUNTER
Spoke to patient. She was informed to watch for possible infection of the wound. She is in agreement to seek medical treatment if symptoms worsen. All questions answered.

## 2017-05-08 ENCOUNTER — TELEPHONE (OUTPATIENT)
Dept: FAMILY MEDICINE CLINIC | Facility: CLINIC | Age: 32
End: 2017-05-08

## 2017-05-08 ENCOUNTER — OFFICE VISIT (OUTPATIENT)
Dept: FAMILY MEDICINE CLINIC | Facility: CLINIC | Age: 32
End: 2017-05-08

## 2017-05-08 VITALS
HEART RATE: 86 BPM | DIASTOLIC BLOOD PRESSURE: 78 MMHG | RESPIRATION RATE: 12 BRPM | SYSTOLIC BLOOD PRESSURE: 128 MMHG | TEMPERATURE: 99 F | OXYGEN SATURATION: 99 %

## 2017-05-08 DIAGNOSIS — G43.701 CHRONIC MIGRAINE WITHOUT AURA WITH STATUS MIGRAINOSUS, NOT INTRACTABLE: Primary | ICD-10-CM

## 2017-05-08 PROCEDURE — 99214 OFFICE O/P EST MOD 30 MIN: CPT | Performed by: FAMILY MEDICINE

## 2017-05-08 RX ORDER — SUMATRIPTAN 50 MG/1
50 TABLET, FILM COATED ORAL EVERY 2 HOUR PRN
Qty: 9 TABLET | Refills: 1 | Status: SHIPPED | OUTPATIENT
Start: 2017-05-08 | End: 2017-08-03

## 2017-05-08 NOTE — TELEPHONE ENCOUNTER
Patient said she has had a headache since last Tuesday, she is taking Ibuprofen 800 mg and 1 Butalbital every 3-4 hours, it will take the headache away but it comes back after 3 hours with a vengeance.  It is only on her right side, she does not know what t

## 2017-05-08 NOTE — PROGRESS NOTES
HPI:    Patient ID: Damaris Ahumada is a 33year old female. HPI  Patient is here with complaint of migraine headache on the right side. It does hurt a lot. Years ago she did try Imitrex , maxalt didn't help.    Review of Systems  Negative except non-distended, no hepatosplenomegaly, no abnormal aortic pulsation. MS:  No paraspinal or spinal tenderness, negative straight leg raise bilaterally, no joint swelling or tenderness, normal strength, range of motion and sensation in all four extremities.

## 2017-05-09 ENCOUNTER — TELEPHONE (OUTPATIENT)
Dept: FAMILY MEDICINE CLINIC | Facility: CLINIC | Age: 32
End: 2017-05-09

## 2017-05-09 NOTE — TELEPHONE ENCOUNTER
I did speak to the patient at length regarding possible drug interaction between Imitrex and Prozac. It would be best to try to avoid Imitrex and Prozac. Risk of serotonin syndrome discussed in detail.  She is using ibuprofen and OTC decongestant when she

## 2017-05-15 DIAGNOSIS — F32.A DEPRESSION, UNSPECIFIED DEPRESSION TYPE: Primary | ICD-10-CM

## 2017-05-15 DIAGNOSIS — F41.9 ANXIETY: ICD-10-CM

## 2017-05-15 RX ORDER — FLUOXETINE HYDROCHLORIDE 40 MG/1
40 CAPSULE ORAL DAILY
Qty: 30 CAPSULE | Refills: 1 | Status: SHIPPED | OUTPATIENT
Start: 2017-05-15 | End: 2017-08-08

## 2017-05-15 NOTE — TELEPHONE ENCOUNTER
Patient is requesting a refill on Fluoxetine 20 mg, she wants its written for BID dosing. She states that is what is was originally written for. LOV- 5/8/17  No future appointments.

## 2017-05-15 NOTE — TELEPHONE ENCOUNTER
Patient said she was told 2 weeks ago that she could take her Fluoxetine BID because of her increase in anxiety.  She states she takes one in AM and one in PM. When I looked back on her notes, I told her she was told that she could increase her dosage to ta

## 2017-05-15 NOTE — TELEPHONE ENCOUNTER
Can she take prozac 40mg daily or is this not covered by insurance dosed this way?    Prozac should be taken once a day

## 2017-05-19 ENCOUNTER — TELEPHONE (OUTPATIENT)
Dept: FAMILY MEDICINE CLINIC | Facility: CLINIC | Age: 32
End: 2017-05-19

## 2017-05-19 NOTE — TELEPHONE ENCOUNTER
Pt informed she would need an appt to get her finger checked for possible fracture since its been swollen for 1 month  Pt agrees. appt scheduled.

## 2017-05-23 ENCOUNTER — OFFICE VISIT (OUTPATIENT)
Dept: FAMILY MEDICINE CLINIC | Facility: CLINIC | Age: 32
End: 2017-05-23

## 2017-05-23 VITALS
RESPIRATION RATE: 12 BRPM | HEART RATE: 97 BPM | DIASTOLIC BLOOD PRESSURE: 78 MMHG | TEMPERATURE: 98 F | SYSTOLIC BLOOD PRESSURE: 118 MMHG | OXYGEN SATURATION: 99 %

## 2017-05-23 DIAGNOSIS — M79.645 FINGER PAIN, LEFT: Primary | ICD-10-CM

## 2017-05-23 PROCEDURE — 99213 OFFICE O/P EST LOW 20 MIN: CPT | Performed by: FAMILY MEDICINE

## 2017-05-23 NOTE — PROGRESS NOTES
HPI:    Patient ID: Marybeth Moody is a 33year old female. HPI  Patient states 1 month ago she banged her left hand pinky finger on the side of a door while she was walking and it hurt a lot.   Since then she has had still some discomfort in the

## 2017-05-25 ENCOUNTER — TELEPHONE (OUTPATIENT)
Dept: FAMILY MEDICINE CLINIC | Facility: CLINIC | Age: 32
End: 2017-05-25

## 2017-05-25 ENCOUNTER — HOSPITAL ENCOUNTER (OUTPATIENT)
Dept: GENERAL RADIOLOGY | Age: 32
Discharge: HOME | End: 2017-05-25
Attending: FAMILY MEDICINE
Payer: MEDICAID

## 2017-05-25 DIAGNOSIS — M79.645 FINGER PAIN, LEFT: ICD-10-CM

## 2017-05-25 PROCEDURE — 73140 X-RAY EXAM OF FINGER(S): CPT | Performed by: FAMILY MEDICINE

## 2017-05-26 ENCOUNTER — TELEPHONE (OUTPATIENT)
Dept: FAMILY MEDICINE CLINIC | Facility: CLINIC | Age: 32
End: 2017-05-26

## 2017-05-26 NOTE — TELEPHONE ENCOUNTER
Patient called to get test results of x-ray of finger. Patient informed that x-ray was negative. Patient may use finger splint when she has bought. Ibuprofen was not helping and she does have significant pain she states.   Will switch to etodolac 400 mg

## 2017-05-30 ENCOUNTER — OFFICE VISIT (OUTPATIENT)
Dept: FAMILY MEDICINE CLINIC | Facility: CLINIC | Age: 32
End: 2017-05-30

## 2017-05-30 VITALS
RESPIRATION RATE: 12 BRPM | SYSTOLIC BLOOD PRESSURE: 118 MMHG | HEART RATE: 90 BPM | BODY MASS INDEX: 37 KG/M2 | TEMPERATURE: 98 F | WEIGHT: 205 LBS | DIASTOLIC BLOOD PRESSURE: 68 MMHG | OXYGEN SATURATION: 99 %

## 2017-05-30 DIAGNOSIS — J01.00 ACUTE NON-RECURRENT MAXILLARY SINUSITIS: Primary | ICD-10-CM

## 2017-05-30 PROCEDURE — 99213 OFFICE O/P EST LOW 20 MIN: CPT | Performed by: FAMILY MEDICINE

## 2017-05-30 RX ORDER — LEVOFLOXACIN 500 MG/1
500 TABLET, FILM COATED ORAL DAILY
Qty: 10 TABLET | Refills: 0 | Status: SHIPPED | OUTPATIENT
Start: 2017-05-30 | End: 2017-06-28 | Stop reason: ALTCHOICE

## 2017-05-30 NOTE — PROGRESS NOTES
CC:   sinus pain    HPI:   Pt has sinus pain, pressure, congestion for the past several days or more. Pt has tried OTC meds without much relief. Green nasal discharge noted. ROS:   negative except for the above.      PE:   Awake, alert, in no acute dist

## 2017-06-26 ENCOUNTER — TELEPHONE (OUTPATIENT)
Dept: FAMILY MEDICINE CLINIC | Facility: CLINIC | Age: 32
End: 2017-06-26

## 2017-06-26 NOTE — TELEPHONE ENCOUNTER
LMTCB X 1    No future appointments. Pt was asked to CB and possibly schedule an appt with  to better diagnose what could be potentially giving her bites on her leg.

## 2017-06-28 ENCOUNTER — OFFICE VISIT (OUTPATIENT)
Dept: FAMILY MEDICINE CLINIC | Facility: CLINIC | Age: 32
End: 2017-06-28

## 2017-06-28 VITALS
HEART RATE: 88 BPM | TEMPERATURE: 98 F | RESPIRATION RATE: 18 BRPM | OXYGEN SATURATION: 100 % | DIASTOLIC BLOOD PRESSURE: 76 MMHG | SYSTOLIC BLOOD PRESSURE: 122 MMHG

## 2017-06-28 DIAGNOSIS — R10.9 FLANK PAIN: Primary | ICD-10-CM

## 2017-06-28 DIAGNOSIS — R10.11 RIGHT UPPER QUADRANT ABDOMINAL PAIN: ICD-10-CM

## 2017-06-28 DIAGNOSIS — R07.9 CHEST PAIN, UNSPECIFIED TYPE: ICD-10-CM

## 2017-06-28 DIAGNOSIS — J30.9 ALLERGIC RHINITIS, UNSPECIFIED ALLERGIC RHINITIS TRIGGER, UNSPECIFIED RHINITIS SEASONALITY: ICD-10-CM

## 2017-06-28 PROCEDURE — 81003 URINALYSIS AUTO W/O SCOPE: CPT | Performed by: FAMILY MEDICINE

## 2017-06-28 PROCEDURE — 99214 OFFICE O/P EST MOD 30 MIN: CPT | Performed by: FAMILY MEDICINE

## 2017-06-28 NOTE — PROGRESS NOTES
HPI:    Patient ID: Massimo Kennedy is a 33year old female. HPI  Pt is here with complaint of right-sided upper abdominal/chest pain lateral lower chest wall pain the past several days.   He does have history of gallbladder removal in the past.  H hepatosplenomegaly, no abnormal aortic pulsation. MS: Mild tenderness to palpation in the right upper quadrant, lateral rib.          ASSESSMENT/PLAN:   Flank pain  (primary encounter diagnosis)  Allergic rhinitis, unspecified allergic rhinitis trigger, u

## 2017-07-03 ENCOUNTER — TELEPHONE (OUTPATIENT)
Dept: FAMILY MEDICINE CLINIC | Facility: CLINIC | Age: 32
End: 2017-07-03

## 2017-07-03 NOTE — TELEPHONE ENCOUNTER
Pt states she had knee injury approx. 1 1/2 yrs ago and now she is starting to feel the same pain on her right knee. appt was offered for wednes July 5th. Pt declined and states she cannot bear the pain until then and will have to go to the ER.  I advised p

## 2017-07-16 ENCOUNTER — APPOINTMENT (OUTPATIENT)
Dept: GENERAL RADIOLOGY | Age: 32
End: 2017-07-16
Attending: PHYSICIAN ASSISTANT
Payer: COMMERCIAL

## 2017-07-16 ENCOUNTER — HOSPITAL ENCOUNTER (EMERGENCY)
Age: 32
Discharge: HOME | End: 2017-07-16
Attending: EMERGENCY MEDICINE
Payer: COMMERCIAL

## 2017-07-16 VITALS
SYSTOLIC BLOOD PRESSURE: 125 MMHG | DIASTOLIC BLOOD PRESSURE: 87 MMHG | HEART RATE: 93 BPM | HEIGHT: 62 IN | OXYGEN SATURATION: 98 % | TEMPERATURE: 99 F | BODY MASS INDEX: 37.73 KG/M2 | WEIGHT: 205 LBS | RESPIRATION RATE: 16 BRPM

## 2017-07-16 DIAGNOSIS — S60.221A CONTUSION OF RIGHT HAND, INITIAL ENCOUNTER: Primary | ICD-10-CM

## 2017-07-16 PROCEDURE — 99283 EMERGENCY DEPT VISIT LOW MDM: CPT

## 2017-07-16 PROCEDURE — 73130 X-RAY EXAM OF HAND: CPT | Performed by: PHYSICIAN ASSISTANT

## 2017-07-16 RX ORDER — IBUPROFEN 600 MG/1
600 TABLET ORAL ONCE
Status: COMPLETED | OUTPATIENT
Start: 2017-07-16 | End: 2017-07-16

## 2017-07-16 NOTE — ED PROVIDER NOTES
78-year-old female who is right-hand dominant who sustained injury to her hand 2 days ago presents with continued pain and swelling particularly of her ring finger. No other injuries or complaints. She is neurovascular intact.   She was seen by me as well

## 2017-07-16 NOTE — ED PROVIDER NOTES
Patient Seen in: [facility] In [place]    History   Patient presents with:  Upper Extremity Injury (musculoskeletal)    Stated Complaint: right hand injury    HPI    58-year-old right-hand-dominant female presents to emergency departm Smokeless tobacco: Never Used                      Alcohol use: No                Review of Systems    Positive for stated complaint: right hand injury  Other systems are as noted in HPI. Constitutional and vital signs reviewed.       All other systems rev PATIENT STATED HISTORY: (As transcribed by Technologist) Daniel Thompson was moving a dresser 2 days ago and the [employer] got caught on the foam of floor causing the drawer to hit her right hand.  She has pain and swelling throughout her 4th digit and the dorsal

## 2017-07-19 ENCOUNTER — TELEPHONE (OUTPATIENT)
Dept: FAMILY MEDICINE CLINIC | Facility: CLINIC | Age: 32
End: 2017-07-19

## 2017-07-19 ENCOUNTER — OFFICE VISIT (OUTPATIENT)
Dept: FAMILY MEDICINE CLINIC | Facility: CLINIC | Age: 32
End: 2017-07-19

## 2017-07-19 VITALS
HEART RATE: 80 BPM | DIASTOLIC BLOOD PRESSURE: 76 MMHG | RESPIRATION RATE: 16 BRPM | BODY MASS INDEX: 38 KG/M2 | WEIGHT: 210 LBS | OXYGEN SATURATION: 98 % | SYSTOLIC BLOOD PRESSURE: 118 MMHG | TEMPERATURE: 98 F

## 2017-07-19 DIAGNOSIS — S69.91XD HAND INJURY, RIGHT, SUBSEQUENT ENCOUNTER: Primary | ICD-10-CM

## 2017-07-19 DIAGNOSIS — R29.898 WEAKNESS OF RIGHT HAND: ICD-10-CM

## 2017-07-19 DIAGNOSIS — Z09 FOLLOW UP: ICD-10-CM

## 2017-07-19 DIAGNOSIS — S60.041D CONTUSION OF RIGHT RING FINGER WITHOUT DAMAGE TO NAIL, SUBSEQUENT ENCOUNTER: ICD-10-CM

## 2017-07-19 DIAGNOSIS — M79.641 PAIN IN RIGHT HAND: ICD-10-CM

## 2017-07-19 PROCEDURE — 99214 OFFICE O/P EST MOD 30 MIN: CPT | Performed by: FAMILY MEDICINE

## 2017-07-19 NOTE — PATIENT INSTRUCTIONS
Finger Contusion  You have a contusion. This is also called a bruise. There is swelling and some bleeding under the skin, but no broken bones.  This injury generally takes a few days to a few weeks to heal. During that time, the bruise will typically mccabe · Frequent bruising for unknown reasons  Date Last Reviewed: 4/29/2015  © 9008-4431 [address], [address] [place] [address]. All rights reserved. This information is not intended as a substitute for professional medical care.  A · Bruise is near your eye and you have problems with your eyesight or eye   · Frequent bruising for unknown reasons  Date Last Reviewed: 4/29/2015  © 0607-0862 The [address] [place] [address], [address] [place] [address]. All rights reserved.  This

## 2017-07-19 NOTE — TELEPHONE ENCOUNTER
Future Appointments  Date Time Provider Harriet Bell   7/19/2017 1:30 PM Ney Moy,  [facility] [address] [facility] [address]

## 2017-07-19 NOTE — PROGRESS NOTES
Nhung Sandoval is a 27year old female. HPI:   Pt here for ER visit follow up for a right hand injury with son present. States she was working on home cutting carpet and got injured sliding dresser and her hand got caught in the [employer] drawer.   Sergio Ram exertion  CARDIOVASCULAR: denies chest pain on exertion  GI: denies abdominal pain and denies heartburn  NEURO: denies headaches  EXTREM: right hand swollen after closing it in a dresser and went to ER on 7/16/17 and had no fracture on xray and feels she s letters were written face to face with the patient as well. Safety at home discussed as well with pt and she understands and agrees to all plans and discussions.      Diagnoses and all orders for this visit:    Hand injury, right, subsequent encounter  -

## 2017-07-19 NOTE — TELEPHONE ENCOUNTER
Pt was in ER on 7.16.17 for a compound bruise on her left hand. She states the pain is getting worse and would like to be see today.

## 2017-07-25 ENCOUNTER — OFFICE VISIT (OUTPATIENT)
Dept: PHYSICAL THERAPY | Age: 32
End: 2017-07-25
Attending: FAMILY MEDICINE
Payer: COMMERCIAL

## 2017-07-25 DIAGNOSIS — S60.041D CONTUSION OF RIGHT RING FINGER WITHOUT DAMAGE TO NAIL, SUBSEQUENT ENCOUNTER: ICD-10-CM

## 2017-07-25 DIAGNOSIS — M79.641 PAIN IN RIGHT HAND: ICD-10-CM

## 2017-07-25 DIAGNOSIS — S69.91XD HAND INJURY, RIGHT, SUBSEQUENT ENCOUNTER: ICD-10-CM

## 2017-07-25 DIAGNOSIS — R29.898 WEAKNESS OF RIGHT HAND: ICD-10-CM

## 2017-07-25 PROCEDURE — 97530 THERAPEUTIC ACTIVITIES: CPT

## 2017-07-25 PROCEDURE — 97161 PT EVAL LOW COMPLEX 20 MIN: CPT

## 2017-07-25 NOTE — PROGRESS NOTES
UPPER EXTREMITY EVALUATION:   Referring Physician: Dr. Maria Going  Diagnosis: Right Phalanx (4th) Contusion     Date of Service: 7/25/2017     PATIENT SUMMARY   Hay Madison is a 27year old y/o right hand dominant female who presents to therapy tod Strength/MMT: Full MMT of all phalanx, wrist, and elbows bilaterally. Special tests: Negative classical special tests. Today’s Treatment and Response: Patient education provided on 7/25/2017 for reactivity and stage of condition.   Patient was

## 2017-07-31 ENCOUNTER — APPOINTMENT (OUTPATIENT)
Dept: PHYSICAL THERAPY | Age: 32
End: 2017-07-31
Attending: FAMILY MEDICINE
Payer: COMMERCIAL

## 2017-08-02 ENCOUNTER — OFFICE VISIT (OUTPATIENT)
Dept: PHYSICAL THERAPY | Age: 32
End: 2017-08-02
Attending: FAMILY MEDICINE
Payer: COMMERCIAL

## 2017-08-02 PROCEDURE — 97140 MANUAL THERAPY 1/> REGIONS: CPT

## 2017-08-02 PROCEDURE — 97110 THERAPEUTIC EXERCISES: CPT

## 2017-08-02 NOTE — PROGRESS NOTES
Dx: Right 4th Phalanx Strain         Authorized # of Visits:  8         Next MD visit: none scheduled  Fall Risk: standard         Precautions: n/a             Subjective:  The patient states that there has been an increase in pain since she has been instal

## 2017-08-03 ENCOUNTER — HOSPITAL ENCOUNTER (EMERGENCY)
Age: 32
Discharge: HOME | End: 2017-08-03
Attending: EMERGENCY MEDICINE
Payer: COMMERCIAL

## 2017-08-03 ENCOUNTER — APPOINTMENT (OUTPATIENT)
Dept: CT IMAGING | Age: 32
End: 2017-08-03
Attending: EMERGENCY MEDICINE
Payer: COMMERCIAL

## 2017-08-03 ENCOUNTER — TELEPHONE (OUTPATIENT)
Dept: FAMILY MEDICINE CLINIC | Facility: CLINIC | Age: 32
End: 2017-08-03

## 2017-08-03 VITALS
SYSTOLIC BLOOD PRESSURE: 110 MMHG | RESPIRATION RATE: 16 BRPM | HEART RATE: 73 BPM | HEIGHT: 62 IN | DIASTOLIC BLOOD PRESSURE: 49 MMHG | OXYGEN SATURATION: 98 % | BODY MASS INDEX: 38.64 KG/M2 | TEMPERATURE: 98 F | WEIGHT: 210 LBS

## 2017-08-03 DIAGNOSIS — H81.10 VERTIGO, BENIGN POSITIONAL, UNSPECIFIED LATERALITY: Primary | ICD-10-CM

## 2017-08-03 LAB
ALBUMIN SERPL-MCNC: 4 G/DL (ref 3.5–4.8)
ALP LIVER SERPL-CCNC: 101 U/L (ref 37–98)
ALT SERPL-CCNC: 43 U/L (ref 14–54)
AST SERPL-CCNC: 29 U/L (ref 15–41)
ATRIAL RATE: 75 BPM
BASOPHILS # BLD AUTO: 0.07 X10(3) UL (ref 0–0.1)
BASOPHILS NFR BLD AUTO: 0.5 %
BILIRUB SERPL-MCNC: 0.6 MG/DL (ref 0.1–2)
BILIRUB UR QL STRIP.AUTO: NEGATIVE
BUN BLD-MCNC: 10 MG/DL (ref 8–20)
CALCIUM BLD-MCNC: 8.8 MG/DL (ref 8.3–10.3)
CHLORIDE: 107 MMOL/L (ref 101–111)
CLARITY UR REFRACT.AUTO: CLEAR
CO2: 26 MMOL/L (ref 22–32)
COLOR UR AUTO: YELLOW
CREAT BLD-MCNC: 0.74 MG/DL (ref 0.55–1.02)
EOSINOPHIL # BLD AUTO: 0.2 X10(3) UL (ref 0–0.3)
EOSINOPHIL NFR BLD AUTO: 1.5 %
ERYTHROCYTE [DISTWIDTH] IN BLOOD BY AUTOMATED COUNT: 12.6 % (ref 11.5–16)
GLUCOSE BLD-MCNC: 86 MG/DL (ref 70–99)
GLUCOSE UR STRIP.AUTO-MCNC: NEGATIVE MG/DL
HCT VFR BLD AUTO: 40.8 % (ref 34–50)
HGB BLD-MCNC: 14.4 G/DL (ref 12–16)
IMMATURE GRANULOCYTE COUNT: 0.06 X10(3) UL (ref 0–1)
IMMATURE GRANULOCYTE RATIO %: 0.5 %
KETONES UR STRIP.AUTO-MCNC: NEGATIVE MG/DL
LEUKOCYTE ESTERASE UR QL STRIP.AUTO: NEGATIVE
LYMPHOCYTES # BLD AUTO: 2.08 X10(3) UL (ref 0.9–4)
LYMPHOCYTES NFR BLD AUTO: 16 %
M PROTEIN MFR SERPL ELPH: 7.3 G/DL (ref 6.1–8.3)
MCH RBC QN AUTO: 28 PG (ref 27–33.2)
MCHC RBC AUTO-ENTMCNC: 35.3 G/DL (ref 31–37)
MCV RBC AUTO: 79.4 FL (ref 81–100)
MONOCYTES # BLD AUTO: 0.71 X10(3) UL (ref 0.1–0.6)
MONOCYTES NFR BLD AUTO: 5.5 %
NEUTROPHIL ABS PRELIM: 9.87 X10 (3) UL (ref 1.3–6.7)
NEUTROPHILS # BLD AUTO: 9.87 X10(3) UL (ref 1.3–6.7)
NEUTROPHILS NFR BLD AUTO: 76 %
NITRITE UR QL STRIP.AUTO: NEGATIVE
P AXIS: 11 DEGREES
P-R INTERVAL: 142 MS
PH UR STRIP.AUTO: 6.5 [PH] (ref 4.5–8)
PLATELET # BLD AUTO: 282 10(3)UL (ref 150–450)
POCT LOT NUMBER: NORMAL
POCT URINE PREGNANCY: NEGATIVE
POTASSIUM SERPL-SCNC: 3.8 MMOL/L (ref 3.6–5.1)
PROT UR STRIP.AUTO-MCNC: NEGATIVE MG/DL
Q-T INTERVAL: 396 MS
QRS DURATION: 86 MS
QTC CALCULATION (BEZET): 442 MS
R AXIS: 43 DEGREES
RBC # BLD AUTO: 5.14 X10(6)UL (ref 3.8–5.1)
RBC UR QL AUTO: NEGATIVE
RED CELL DISTRIBUTION WIDTH-SD: 36 FL (ref 35.1–46.3)
SODIUM SERPL-SCNC: 138 MMOL/L (ref 136–144)
SP GR UR STRIP.AUTO: 1.01 (ref 1–1.03)
T AXIS: 30 DEGREES
UROBILINOGEN UR STRIP.AUTO-MCNC: 0.2 MG/DL
VENTRICULAR RATE: 75 BPM
WBC # BLD AUTO: 13 X10(3) UL (ref 4–13)

## 2017-08-03 PROCEDURE — 70450 CT HEAD/BRAIN W/O DYE: CPT | Performed by: EMERGENCY MEDICINE

## 2017-08-03 PROCEDURE — 99285 EMERGENCY DEPT VISIT HI MDM: CPT

## 2017-08-03 PROCEDURE — 80053 COMPREHEN METABOLIC PANEL: CPT | Performed by: EMERGENCY MEDICINE

## 2017-08-03 PROCEDURE — 93005 ELECTROCARDIOGRAM TRACING: CPT

## 2017-08-03 PROCEDURE — 85025 COMPLETE CBC W/AUTO DIFF WBC: CPT | Performed by: EMERGENCY MEDICINE

## 2017-08-03 PROCEDURE — 81025 URINE PREGNANCY TEST: CPT

## 2017-08-03 PROCEDURE — 96375 TX/PRO/DX INJ NEW DRUG ADDON: CPT

## 2017-08-03 PROCEDURE — 81003 URINALYSIS AUTO W/O SCOPE: CPT | Performed by: EMERGENCY MEDICINE

## 2017-08-03 PROCEDURE — 96361 HYDRATE IV INFUSION ADD-ON: CPT

## 2017-08-03 PROCEDURE — 96374 THER/PROPH/DIAG INJ IV PUSH: CPT

## 2017-08-03 PROCEDURE — 93010 ELECTROCARDIOGRAM REPORT: CPT

## 2017-08-03 RX ORDER — ONDANSETRON 2 MG/ML
4 INJECTION INTRAMUSCULAR; INTRAVENOUS ONCE
Status: COMPLETED | OUTPATIENT
Start: 2017-08-03 | End: 2017-08-03

## 2017-08-03 RX ORDER — SODIUM CHLORIDE 9 MG/ML
INJECTION, SOLUTION INTRAVENOUS ONCE
Status: COMPLETED | OUTPATIENT
Start: 2017-08-03 | End: 2017-08-03

## 2017-08-03 RX ORDER — DIPHENHYDRAMINE HYDROCHLORIDE 50 MG/ML
25 INJECTION INTRAMUSCULAR; INTRAVENOUS ONCE
Status: COMPLETED | OUTPATIENT
Start: 2017-08-03 | End: 2017-08-03

## 2017-08-03 NOTE — TELEPHONE ENCOUNTER
Patient states that she is experiencing bouts of vertigo, along with headaches, and numbness/tingling in her ear. Patient states that she was so light-headed the other day that she almost passed out. Patient also states that she is nauseous as well.

## 2017-08-03 NOTE — ED PROVIDER NOTES
Patient Seen in: [facility] In [place]    History   Patient presents with:  Dizziness (neurologic)  Ear Problem Pain (neurosensory)  Nausea/Vomiting/Diarrhea (gastrointestinal)    Stated Complaint: lightheaded,dizzy,nausea/headache pa Cancer Maternal Grandmother    • Cancer Maternal Grandfather      prostate cancer   • Asthma Mother    • Diabetes Mother        Smoking status: Current Every Day Smoker                                                   Packs/day: 1.00      Years: 0.00 reflexes are 1+ equal and symmetrical.    Skin exam reveals no rashes.       ED Course     Labs Reviewed   COMP METABOLIC PANEL (14) - Abnormal; Notable for the following:        Result Value    Alkaline Phosphatase 101 (*)     All other components within n intracranial process.         ============================================================  ED Course  ------------------------------------------------------------  MDM   Patient had an IV established in the emergency room was placed on a cardiac monitor w

## 2017-08-03 NOTE — TELEPHONE ENCOUNTER
Spoke to patient. She states she has been experiencing extreme dizziness with loss of vision. She states she has H/O of vertigo and is allergic to meclizine.  She did have her mother come back home as she does not want to be alone with her 11year old son ju

## 2017-08-07 ENCOUNTER — TELEPHONE (OUTPATIENT)
Dept: FAMILY MEDICINE CLINIC | Facility: CLINIC | Age: 32
End: 2017-08-07

## 2017-08-07 ENCOUNTER — OFFICE VISIT (OUTPATIENT)
Dept: PHYSICAL THERAPY | Age: 32
End: 2017-08-07
Attending: FAMILY MEDICINE
Payer: COMMERCIAL

## 2017-08-07 PROCEDURE — 97140 MANUAL THERAPY 1/> REGIONS: CPT

## 2017-08-07 PROCEDURE — 97110 THERAPEUTIC EXERCISES: CPT

## 2017-08-07 NOTE — TELEPHONE ENCOUNTER
Pt called to give a condition update. She went to the ER on 8/3/17, diagnosed with benign vertigo. She states symptoms do not feel like vertigo and feel more like a sinus infection.  She has had sinus infections in the past and would like to be seen at our

## 2017-08-07 NOTE — PROGRESS NOTES
Dx: Right 4th Phalanx Strain         Authorized # of Visits:  8         Next MD visit: none scheduled  Fall Risk: standard         Precautions: n/a             Subjective: Finger is doing well. Able to complete all tasks as prior to injury.       Objective

## 2017-08-08 ENCOUNTER — TELEPHONE (OUTPATIENT)
Dept: FAMILY MEDICINE CLINIC | Facility: CLINIC | Age: 32
End: 2017-08-08

## 2017-08-08 ENCOUNTER — OFFICE VISIT (OUTPATIENT)
Dept: FAMILY MEDICINE CLINIC | Facility: CLINIC | Age: 32
End: 2017-08-08

## 2017-08-08 VITALS
HEIGHT: 62 IN | BODY MASS INDEX: 39.2 KG/M2 | OXYGEN SATURATION: 100 % | TEMPERATURE: 98 F | SYSTOLIC BLOOD PRESSURE: 114 MMHG | RESPIRATION RATE: 12 BRPM | DIASTOLIC BLOOD PRESSURE: 60 MMHG | WEIGHT: 213 LBS | HEART RATE: 83 BPM

## 2017-08-08 DIAGNOSIS — F41.9 ANXIETY: ICD-10-CM

## 2017-08-08 DIAGNOSIS — F32.A DEPRESSION, UNSPECIFIED DEPRESSION TYPE: ICD-10-CM

## 2017-08-08 DIAGNOSIS — Z09 FOLLOW UP: ICD-10-CM

## 2017-08-08 DIAGNOSIS — G43.701 CHRONIC MIGRAINE WITHOUT AURA WITH STATUS MIGRAINOSUS, NOT INTRACTABLE: ICD-10-CM

## 2017-08-08 DIAGNOSIS — Z51.81 ENCOUNTER FOR THERAPEUTIC DRUG MONITORING: ICD-10-CM

## 2017-08-08 DIAGNOSIS — H81.10 BPV (BENIGN POSITIONAL VERTIGO), UNSPECIFIED LATERALITY: Primary | ICD-10-CM

## 2017-08-08 PROCEDURE — 99214 OFFICE O/P EST MOD 30 MIN: CPT | Performed by: FAMILY MEDICINE

## 2017-08-08 RX ORDER — PROCHLORPERAZINE MALEATE 10 MG
10 TABLET ORAL EVERY 8 HOURS PRN
Qty: 30 TABLET | Refills: 0 | Status: SHIPPED | OUTPATIENT
Start: 2017-08-08 | End: 2017-08-18

## 2017-08-08 RX ORDER — BUTALBITAL, ACETAMINOPHEN AND CAFFEINE 50; 325; 40 MG/1; MG/1; MG/1
1 TABLET ORAL 3 TIMES DAILY PRN
Qty: 90 TABLET | Refills: 0 | Status: SHIPPED | OUTPATIENT
Start: 2017-08-08 | End: 2017-09-07

## 2017-08-08 RX ORDER — FLUOXETINE HYDROCHLORIDE 40 MG/1
40 CAPSULE ORAL DAILY
Qty: 90 CAPSULE | Refills: 1 | Status: SHIPPED | OUTPATIENT
Start: 2017-08-08 | End: 2018-02-04

## 2017-08-08 RX ORDER — MECLIZINE HYDROCHLORIDE 25 MG/1
25 TABLET ORAL 3 TIMES DAILY PRN
Qty: 30 TABLET | Refills: 0 | Status: SHIPPED | OUTPATIENT
Start: 2017-08-08 | End: 2017-08-08

## 2017-08-08 NOTE — PATIENT INSTRUCTIONS
Benign Paroxysmal Positional Vertigo  Benign paroxysmal positional vertigo (BPPV) is a problem with the inner ear. The inner ear contains the vestibular system. This system is what helps you keep your balance. BPPV causes a feeling of spinning.  It is a c Your health care provider may try to move the calcium crystals. This is done by having you move your head and neck in certain ways. This treatment is safe and often works well. You may also be told to do these movements at home.  You may still have vertigo © 4741-6724 [address], [address] [place] [place]. All rights reserved. This information is not intended as a substitute for professional medical care. Always follow your healthcare professional's instructions.

## 2017-08-08 NOTE — PROGRESS NOTES
Cherise Ann is a 27year old female. HPI:   Pt here for follow up from ER two from days ago for an acute headache--she states she had a normal CT brain done and told benign positional vertigo.     She is asking for refills of her migraine medicat BPV   PSYCHE: wants mental health medications refilled today     EXAM:   /60   Pulse 83   Temp 98.4 °F (36.9 °C) (Oral)   Resp 12   Ht 62\"   Wt 213 lb   SpO2 100%   BMI 38.96 kg/m²   GENERAL: well developed, well nourished,in no apparent distress  S dizziness). Follow up ER 8/3/17  Comments:  ER 8/3/17    Encounter for therapeutic drug monitoring  -     FLUoxetine HCl 40 MG Oral Cap; Take 1 capsule (40 mg total) by mouth daily.  -     Butalbital-APAP-Caffeine -40 MG Oral Tab;  Take 1 tablet by

## 2017-08-08 NOTE — TELEPHONE ENCOUNTER
I discussed with pt at [address] --not a true allergy--- but regardless, I cancelled RX. I ordered Compazine to use PRN for nausea and dizziness instead. Let the pt know and the order was sent to pharmacy already. Thanks.  Dr. Shelbi Powell

## 2017-08-09 ENCOUNTER — APPOINTMENT (OUTPATIENT)
Dept: PHYSICAL THERAPY | Age: 32
End: 2017-08-09
Attending: FAMILY MEDICINE
Payer: COMMERCIAL

## 2017-08-14 ENCOUNTER — APPOINTMENT (OUTPATIENT)
Dept: PHYSICAL THERAPY | Age: 32
End: 2017-08-14
Attending: FAMILY MEDICINE
Payer: COMMERCIAL

## 2017-08-16 ENCOUNTER — APPOINTMENT (OUTPATIENT)
Dept: PHYSICAL THERAPY | Age: 32
End: 2017-08-16
Attending: FAMILY MEDICINE
Payer: COMMERCIAL

## 2017-08-28 ENCOUNTER — TELEPHONE (OUTPATIENT)
Dept: FAMILY MEDICINE CLINIC | Facility: CLINIC | Age: 32
End: 2017-08-28

## 2017-08-28 NOTE — TELEPHONE ENCOUNTER
Spoke to patient and advised water hydration to 3-6 glasses a day and also to get on a daily women's multivitamin with iron, pt verbally agreed with plan and all questions and concerns were addressed.

## 2017-08-28 NOTE — TELEPHONE ENCOUNTER
Advise pt to increase water hydration to three to six glasses a day and also to get on a daily women's multiple vitamin with iron such as one a day over the counter.  Dr. Missy Camacho
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
## 2017-09-11 ENCOUNTER — OFFICE VISIT (OUTPATIENT)
Dept: FAMILY MEDICINE CLINIC | Facility: CLINIC | Age: 32
End: 2017-09-11

## 2017-09-11 VITALS
HEIGHT: 62 IN | DIASTOLIC BLOOD PRESSURE: 70 MMHG | HEART RATE: 80 BPM | WEIGHT: 213 LBS | SYSTOLIC BLOOD PRESSURE: 120 MMHG | TEMPERATURE: 98 F | RESPIRATION RATE: 16 BRPM | BODY MASS INDEX: 39.2 KG/M2

## 2017-09-11 DIAGNOSIS — R42 VERTIGO: Primary | ICD-10-CM

## 2017-09-11 DIAGNOSIS — J32.0 MAXILLARY SINUSITIS, UNSPECIFIED CHRONICITY: ICD-10-CM

## 2017-09-11 PROCEDURE — 99214 OFFICE O/P EST MOD 30 MIN: CPT | Performed by: FAMILY MEDICINE

## 2017-09-11 RX ORDER — LEVOFLOXACIN 500 MG/1
500 TABLET, FILM COATED ORAL DAILY
Qty: 10 TABLET | Refills: 0 | Status: SHIPPED | OUTPATIENT
Start: 2017-09-11 | End: 2017-09-19 | Stop reason: ALTCHOICE

## 2017-09-11 RX ORDER — FLUCONAZOLE 150 MG/1
150 TABLET ORAL ONCE
Qty: 1 TABLET | Refills: 0 | Status: SHIPPED | OUTPATIENT
Start: 2017-09-11 | End: 2017-11-28

## 2017-09-11 NOTE — PROGRESS NOTES
HPI:    Patient ID: Shashi Lombardo is a 27year old female.     HPI  Patient presents with:  Sinus Problem: sinus infection - has tried sudafed OTC - Benadryl with no help  Abdominal Pain: x3-5 days right side abdomen  Ear Problem: PND  Dizziness: of soft, non-tender, non-distended, no hepatosplenomegaly, no abnormal aortic pulsation.    NEURO: strength 5/5 all four extremities, sensation intact in all four extremities, cranial nerves II - XII grossly intact, negative Romberg sign, no facial weakness no

## 2017-09-19 ENCOUNTER — OFFICE VISIT (OUTPATIENT)
Dept: FAMILY MEDICINE CLINIC | Facility: CLINIC | Age: 32
End: 2017-09-19

## 2017-09-19 ENCOUNTER — HOSPITAL ENCOUNTER (EMERGENCY)
Age: 32
Discharge: HOME | End: 2017-09-19
Attending: EMERGENCY MEDICINE
Payer: COMMERCIAL

## 2017-09-19 ENCOUNTER — TELEPHONE (OUTPATIENT)
Dept: FAMILY MEDICINE CLINIC | Facility: CLINIC | Age: 32
End: 2017-09-19

## 2017-09-19 VITALS
HEIGHT: 62 IN | BODY MASS INDEX: 38.83 KG/M2 | OXYGEN SATURATION: 98 % | RESPIRATION RATE: 20 BRPM | TEMPERATURE: 98 F | HEART RATE: 89 BPM | SYSTOLIC BLOOD PRESSURE: 115 MMHG | DIASTOLIC BLOOD PRESSURE: 77 MMHG | WEIGHT: 211 LBS

## 2017-09-19 VITALS
HEIGHT: 62 IN | RESPIRATION RATE: 16 BRPM | HEART RATE: 88 BPM | TEMPERATURE: 98 F | SYSTOLIC BLOOD PRESSURE: 116 MMHG | BODY MASS INDEX: 38.83 KG/M2 | DIASTOLIC BLOOD PRESSURE: 74 MMHG | WEIGHT: 211 LBS | OXYGEN SATURATION: 99 %

## 2017-09-19 DIAGNOSIS — J02.8 SORE THROAT (VIRAL): ICD-10-CM

## 2017-09-19 DIAGNOSIS — R19.7 DIARRHEA, UNSPECIFIED TYPE: Primary | ICD-10-CM

## 2017-09-19 DIAGNOSIS — B97.89 SORE THROAT (VIRAL): ICD-10-CM

## 2017-09-19 DIAGNOSIS — J02.9 SORE THROAT: Primary | ICD-10-CM

## 2017-09-19 LAB
BILIRUB UR QL STRIP.AUTO: NEGATIVE
COLOR UR AUTO: YELLOW
CONTROL LINE PRESENT WITH A CLEAR BACKGROUND (YES/NO): YES YES/NO
GLUCOSE UR STRIP.AUTO-MCNC: NEGATIVE MG/DL
KETONES UR STRIP.AUTO-MCNC: NEGATIVE MG/DL
NITRITE UR QL STRIP.AUTO: NEGATIVE
PH UR STRIP.AUTO: 5.5 [PH] (ref 4.5–8)
POCT LOT NUMBER: NORMAL
POCT URINE PREGNANCY: NEGATIVE
PROT UR STRIP.AUTO-MCNC: NEGATIVE MG/DL
RBC UR QL AUTO: NEGATIVE
SP GR UR STRIP.AUTO: 1.01 (ref 1–1.03)
STREP GRP A CUL-SCR: NEGATIVE
UROBILINOGEN UR STRIP.AUTO-MCNC: 0.2 MG/DL

## 2017-09-19 PROCEDURE — 87086 URINE CULTURE/COLONY COUNT: CPT | Performed by: EMERGENCY MEDICINE

## 2017-09-19 PROCEDURE — 81001 URINALYSIS AUTO W/SCOPE: CPT | Performed by: EMERGENCY MEDICINE

## 2017-09-19 PROCEDURE — 99283 EMERGENCY DEPT VISIT LOW MDM: CPT

## 2017-09-19 PROCEDURE — 81025 URINE PREGNANCY TEST: CPT

## 2017-09-19 PROCEDURE — 99213 OFFICE O/P EST LOW 20 MIN: CPT | Performed by: FAMILY MEDICINE

## 2017-09-19 PROCEDURE — 87880 STREP A ASSAY W/OPTIC: CPT | Performed by: FAMILY MEDICINE

## 2017-09-19 RX ORDER — IBUPROFEN 800 MG/1
800 TABLET ORAL EVERY 8 HOURS PRN
Qty: 30 TABLET | Refills: 0 | Status: SHIPPED | OUTPATIENT
Start: 2017-09-19 | End: 2017-09-26

## 2017-09-19 NOTE — TELEPHONE ENCOUNTER
If right lower quadrant pain is there along with other symptoms, I would go to ER to get it checked out.

## 2017-09-19 NOTE — PROGRESS NOTES
CC: sore throat     HPI: sore throat and pain with swallowing for the past several days or more. No neck pain or stiffness. Low-grade fever    ROS: Except for the above, all other ROS are negative.      PE: Vital signs see nursing notes  Awake, alert, in

## 2017-09-19 NOTE — TELEPHONE ENCOUNTER
Spoke with patient and advised ER visit as Marisela Lentz stated. Pt verbally agreed with plan and will go to [facility].

## 2017-09-19 NOTE — TELEPHONE ENCOUNTER
Please advise, pt was just seen today in the office and no complaints of sweating profusely or abnormal bowel movements.

## 2017-09-19 NOTE — TELEPHONE ENCOUNTER
PT states that when she was seen a week ago and had complaints of R sided pain and she was looking online and she is worried she might have appendicitis since now she is breaking out in sweats and has low grade fevers at nightime and her stool is green.  Pl

## 2017-09-20 ENCOUNTER — TELEPHONE (OUTPATIENT)
Dept: FAMILY MEDICINE CLINIC | Facility: CLINIC | Age: 32
End: 2017-09-20

## 2017-09-20 ENCOUNTER — HOSPITAL ENCOUNTER (OUTPATIENT)
Dept: CT IMAGING | Age: 32
Discharge: HOME | End: 2017-09-20
Attending: FAMILY MEDICINE
Payer: COMMERCIAL

## 2017-09-20 ENCOUNTER — APPOINTMENT (OUTPATIENT)
Dept: LAB | Age: 32
End: 2017-09-20
Attending: FAMILY MEDICINE
Payer: COMMERCIAL

## 2017-09-20 DIAGNOSIS — R10.2 PELVIC PAIN: Primary | ICD-10-CM

## 2017-09-20 DIAGNOSIS — R10.31 RIGHT LOWER QUADRANT ABDOMINAL PAIN: Primary | ICD-10-CM

## 2017-09-20 DIAGNOSIS — R10.2 PELVIC PAIN: ICD-10-CM

## 2017-09-20 DIAGNOSIS — R10.31 RIGHT LOWER QUADRANT ABDOMINAL PAIN: ICD-10-CM

## 2017-09-20 LAB
ALBUMIN SERPL-MCNC: 3.9 G/DL (ref 3.5–4.8)
ALP LIVER SERPL-CCNC: 100 U/L (ref 37–98)
ALT SERPL-CCNC: 22 U/L (ref 14–54)
AST SERPL-CCNC: 15 U/L (ref 15–41)
BILIRUB SERPL-MCNC: 0.5 MG/DL (ref 0.1–2)
BUN BLD-MCNC: 13 MG/DL (ref 8–20)
CALCIUM BLD-MCNC: 9.2 MG/DL (ref 8.3–10.3)
CHLORIDE: 107 MMOL/L (ref 101–111)
CO2: 26 MMOL/L (ref 22–32)
CREAT BLD-MCNC: 0.77 MG/DL (ref 0.55–1.02)
ERYTHROCYTE [DISTWIDTH] IN BLOOD BY AUTOMATED COUNT: 13.2 % (ref 11.5–16)
GLUCOSE BLD-MCNC: 74 MG/DL (ref 70–99)
HCT VFR BLD AUTO: 39.9 % (ref 34–50)
HGB BLD-MCNC: 14.1 G/DL (ref 12–16)
M PROTEIN MFR SERPL ELPH: 7.5 G/DL (ref 6.1–8.3)
MCH RBC QN AUTO: 28.5 PG (ref 27–33.2)
MCHC RBC AUTO-ENTMCNC: 35.3 G/DL (ref 31–37)
MCV RBC AUTO: 80.8 FL (ref 81–100)
PLATELET # BLD AUTO: 261 10(3)UL (ref 150–450)
POTASSIUM SERPL-SCNC: 4.1 MMOL/L (ref 3.6–5.1)
RBC # BLD AUTO: 4.94 X10(6)UL (ref 3.8–5.1)
RED CELL DISTRIBUTION WIDTH-SD: 37.9 FL (ref 35.1–46.3)
SODIUM SERPL-SCNC: 139 MMOL/L (ref 136–144)
WBC # BLD AUTO: 14.5 X10(3) UL (ref 4–13)

## 2017-09-20 PROCEDURE — 74176 CT ABD & PELVIS W/O CONTRAST: CPT | Performed by: FAMILY MEDICINE

## 2017-09-20 PROCEDURE — 85027 COMPLETE CBC AUTOMATED: CPT

## 2017-09-20 PROCEDURE — 36415 COLL VENOUS BLD VENIPUNCTURE: CPT

## 2017-09-20 PROCEDURE — 80053 COMPREHEN METABOLIC PANEL: CPT

## 2017-09-20 NOTE — TELEPHONE ENCOUNTER
Ct ABD NEGATIVE FOR APPENDICITIS. I WOULD CHECK PELVIC U/S AS SHE COULD HAVE OVARIAN CYSTS. WILL ORDER.

## 2017-09-20 NOTE — ED PROVIDER NOTES
Patient Seen in: [facility] In [place]    History   Patient presents with:  Abdomen/Flank Pain (GI/)    Stated Complaint: flank pain, sent by PCP    HPI    78-year-old female comes the hospital the chief complaint of having difficul Packs/day: 1.00      Years: 0.00         Types: Cigarettes  Smokeless tobacco: Never Used                      Alcohol use:  No                Review of Systems    Positive for stated complaint: flank pain, sent by PCP  Other systems are as noted in HP discomfort secondary to coughing and patient be discharged home and follow-up with physician.  ============================================================  ED Course  ------------------------------------------------------------  MDM           Disposition

## 2017-09-20 NOTE — TELEPHONE ENCOUNTER
Spoke to patient regarding Ct results. Informed her to call [facility] scheduling to schedule pelvic us per dr. Robyn Espinosa. Pt agrees. Information given to patient.

## 2017-09-20 NOTE — ED INITIAL ASSESSMENT (HPI)
STS HAD RIGHT SIDE PAIN X 1 WEEK. STS SAW PMD AND DIAGNOSED WITH URI. STS WAS PLACED ON LEVAQUIN PER PMD. STS UNRESOLVED PAIN SINCE. DENIES N/V/D.
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
## 2017-09-20 NOTE — TELEPHONE ENCOUNTER
Patient states that she went to the ER last night. She is still in a lot of pain, and would like to know what to do.

## 2017-09-20 NOTE — TELEPHONE ENCOUNTER
Spoke with patient and advised of STAT CT and blood work that was ordered. Pt states she will get that done now.

## 2017-09-22 ENCOUNTER — HOSPITAL ENCOUNTER (OUTPATIENT)
Dept: ULTRASOUND IMAGING | Age: 32
Discharge: HOME | End: 2017-09-22
Attending: FAMILY MEDICINE
Payer: COMMERCIAL

## 2017-09-22 DIAGNOSIS — R10.2 PELVIC PAIN: ICD-10-CM

## 2017-09-22 PROCEDURE — 76830 TRANSVAGINAL US NON-OB: CPT | Performed by: FAMILY MEDICINE

## 2017-09-22 PROCEDURE — 76856 US EXAM PELVIC COMPLETE: CPT | Performed by: FAMILY MEDICINE

## 2017-09-25 ENCOUNTER — TELEPHONE (OUTPATIENT)
Dept: FAMILY MEDICINE CLINIC | Facility: CLINIC | Age: 32
End: 2017-09-25

## 2017-09-25 RX ORDER — PREDNISONE 20 MG/1
20 TABLET ORAL 2 TIMES DAILY
Qty: 10 TABLET | Refills: 0 | Status: SHIPPED | OUTPATIENT
Start: 2017-09-25 | End: 2017-09-30

## 2017-09-25 RX ORDER — AZITHROMYCIN 250 MG/1
TABLET, FILM COATED ORAL
Qty: 6 TABLET | Refills: 0 | Status: SHIPPED | OUTPATIENT
Start: 2017-09-25 | End: 2017-11-06

## 2017-09-25 NOTE — TELEPHONE ENCOUNTER
Spoke with patient and gave US and lab results.  Pt states that she is still feeling sick and  that this sinus infection is getting worse and no decongestant is helping, pt states that she is feeling really tired and all she wants to do is sleep and she can

## 2017-11-06 ENCOUNTER — OFFICE VISIT (OUTPATIENT)
Dept: FAMILY MEDICINE CLINIC | Facility: CLINIC | Age: 32
End: 2017-11-06

## 2017-11-06 VITALS
BODY MASS INDEX: 39.56 KG/M2 | OXYGEN SATURATION: 99 % | HEART RATE: 95 BPM | DIASTOLIC BLOOD PRESSURE: 68 MMHG | WEIGHT: 215 LBS | TEMPERATURE: 98 F | HEIGHT: 62 IN | SYSTOLIC BLOOD PRESSURE: 108 MMHG | RESPIRATION RATE: 14 BRPM

## 2017-11-06 DIAGNOSIS — R05.8 PRODUCTIVE COUGH: ICD-10-CM

## 2017-11-06 DIAGNOSIS — J01.00 ACUTE MAXILLARY SINUSITIS, RECURRENCE NOT SPECIFIED: Primary | ICD-10-CM

## 2017-11-06 PROCEDURE — 99213 OFFICE O/P EST LOW 20 MIN: CPT | Performed by: FAMILY MEDICINE

## 2017-11-06 RX ORDER — AZITHROMYCIN 250 MG/1
TABLET, FILM COATED ORAL
Qty: 6 TABLET | Refills: 0 | Status: SHIPPED | OUTPATIENT
Start: 2017-11-06 | End: 2017-11-16 | Stop reason: ALTCHOICE

## 2017-11-06 NOTE — PROGRESS NOTES
HPI:    Patient ID: Ifeoma Hoffman is a 27year old female. HPI  Sinus pain pressure congestion and cough and congestion for the past several days or more.   Review of Systems  Negative except for the above       Current Outpatient Prescriptions: Imaging & Referrals:  None       [facility]#[id number]

## 2017-11-16 ENCOUNTER — TELEPHONE (OUTPATIENT)
Dept: FAMILY MEDICINE CLINIC | Facility: CLINIC | Age: 32
End: 2017-11-16

## 2017-11-16 ENCOUNTER — OFFICE VISIT (OUTPATIENT)
Dept: FAMILY MEDICINE CLINIC | Facility: CLINIC | Age: 32
End: 2017-11-16

## 2017-11-16 VITALS
SYSTOLIC BLOOD PRESSURE: 120 MMHG | HEART RATE: 87 BPM | RESPIRATION RATE: 12 BRPM | BODY MASS INDEX: 39.93 KG/M2 | HEIGHT: 62 IN | TEMPERATURE: 98 F | OXYGEN SATURATION: 99 % | WEIGHT: 217 LBS | DIASTOLIC BLOOD PRESSURE: 70 MMHG

## 2017-11-16 DIAGNOSIS — J01.00 ACUTE NON-RECURRENT MAXILLARY SINUSITIS: Primary | ICD-10-CM

## 2017-11-16 PROCEDURE — 99213 OFFICE O/P EST LOW 20 MIN: CPT | Performed by: FAMILY MEDICINE

## 2017-11-16 RX ORDER — AMOXICILLIN AND CLAVULANATE POTASSIUM 500; 125 MG/1; MG/1
1 TABLET, FILM COATED ORAL 2 TIMES DAILY
Qty: 20 TABLET | Refills: 0 | Status: SHIPPED | OUTPATIENT
Start: 2017-11-16 | End: 2017-11-26

## 2017-11-16 RX ORDER — PREDNISONE 20 MG/1
20 TABLET ORAL 2 TIMES DAILY
Qty: 10 TABLET | Refills: 0 | Status: SHIPPED | OUTPATIENT
Start: 2017-11-16 | End: 2017-11-23

## 2017-11-16 NOTE — TELEPHONE ENCOUNTER
The allergy to pencillin is very questionable. Further, she has had amoxicillin type drugs many times in the past without any reaction. I still recommend the Augmentin for the problem she has.

## 2017-11-16 NOTE — TELEPHONE ENCOUNTER
Calling in regards to the medication that was sent to pharmacy today. Their records state that she has a penicillin allergy-please call to verify this information.

## 2017-11-20 ENCOUNTER — TELEPHONE (OUTPATIENT)
Dept: FAMILY MEDICINE CLINIC | Facility: CLINIC | Age: 32
End: 2017-11-20

## 2017-11-20 NOTE — TELEPHONE ENCOUNTER
Patient was called and was informed appt is needed. appt has been scheduled.   Future Appointments  Date Time Provider Harriet Bell   11/21/2017 1:30 PM Fariba Mac MD [facility] [address] [facility] [address]   11/28/2017 10:30 AM Fairba Mac MD [facility] [address] [facility] [address]

## 2017-11-20 NOTE — TELEPHONE ENCOUNTER
Patient states that she injured her L elbow a week ago Sunday. She hit it on the top of a chair. Patient states that there is no outward bruising, but it is painful to the touch and when she puts pressure on it.  She states that there is excruciating pain w

## 2017-11-21 ENCOUNTER — TELEPHONE (OUTPATIENT)
Dept: FAMILY MEDICINE CLINIC | Facility: CLINIC | Age: 32
End: 2017-11-21

## 2017-11-21 ENCOUNTER — OFFICE VISIT (OUTPATIENT)
Dept: FAMILY MEDICINE CLINIC | Facility: CLINIC | Age: 32
End: 2017-11-21

## 2017-11-21 VITALS
DIASTOLIC BLOOD PRESSURE: 76 MMHG | SYSTOLIC BLOOD PRESSURE: 118 MMHG | HEIGHT: 62 IN | RESPIRATION RATE: 16 BRPM | OXYGEN SATURATION: 99 % | WEIGHT: 217 LBS | HEART RATE: 82 BPM | BODY MASS INDEX: 39.93 KG/M2

## 2017-11-21 DIAGNOSIS — S50.12XA CONTUSION OF LEFT ELBOW AND FOREARM, INITIAL ENCOUNTER: Primary | ICD-10-CM

## 2017-11-21 PROCEDURE — 99213 OFFICE O/P EST LOW 20 MIN: CPT | Performed by: FAMILY MEDICINE

## 2017-11-21 RX ORDER — ETODOLAC 400 MG/1
400 TABLET, FILM COATED ORAL 2 TIMES DAILY PRN
Qty: 20 TABLET | Refills: 0 | Status: SHIPPED | OUTPATIENT
Start: 2017-11-21 | End: 2018-02-10

## 2017-11-21 NOTE — TELEPHONE ENCOUNTER
Received notification from [facility] in [place] that Etodolac 400mg tabs is not covered by patient's insurance. Please send script for different medication.

## 2017-11-22 NOTE — PROGRESS NOTES
HPI:    Patient ID: Mindy Souza is a 27year old female. HPI  Patient is here because she has pain in the left elbow and forearm region after banging it on the chair 2 times.   She thought the pain and swelling would go down but it still persis daily as needed (for forearm pain).            Imaging & Referrals:  None       #[id number]

## 2017-11-26 ENCOUNTER — TELEPHONE (OUTPATIENT)
Dept: FAMILY MEDICINE CLINIC | Facility: CLINIC | Age: 32
End: 2017-11-26

## 2017-11-26 RX ORDER — FLUCONAZOLE 150 MG/1
TABLET ORAL
Qty: 1 TABLET | Refills: 0 | Status: CANCELLED | OUTPATIENT
Start: 2017-11-26

## 2017-11-28 ENCOUNTER — OFFICE VISIT (OUTPATIENT)
Dept: FAMILY MEDICINE CLINIC | Facility: CLINIC | Age: 32
End: 2017-11-28

## 2017-11-28 VITALS
RESPIRATION RATE: 12 BRPM | DIASTOLIC BLOOD PRESSURE: 70 MMHG | TEMPERATURE: 98 F | HEART RATE: 102 BPM | BODY MASS INDEX: 40 KG/M2 | WEIGHT: 217 LBS | SYSTOLIC BLOOD PRESSURE: 130 MMHG | OXYGEN SATURATION: 99 %

## 2017-11-28 DIAGNOSIS — J30.9 ALLERGIC RHINITIS, UNSPECIFIED CHRONICITY, UNSPECIFIED SEASONALITY, UNSPECIFIED TRIGGER: ICD-10-CM

## 2017-11-28 DIAGNOSIS — J01.00 ACUTE NON-RECURRENT MAXILLARY SINUSITIS: Primary | ICD-10-CM

## 2017-11-28 PROCEDURE — 99213 OFFICE O/P EST LOW 20 MIN: CPT | Performed by: FAMILY MEDICINE

## 2017-11-28 RX ORDER — FLUCONAZOLE 150 MG/1
150 TABLET ORAL ONCE
Qty: 1 TABLET | Refills: 0 | Status: SHIPPED | OUTPATIENT
Start: 2017-11-28 | End: 2017-11-28

## 2017-11-28 NOTE — PROGRESS NOTES
HPI:    Patient ID: Anita Dugan is a 27year old female. HPI  Patient is here with complaint of having sinus pressure pain slight nasal congestion. She did finish amoxicillin and prednisone and had no problem with those.   No reaction to amoxi unspecified seasonality, unspecified trigger  I would recommend Claritin-D or Allegra-D and nasal steroid spray other than Flonase as she does have irritation to the nose and she needs a water based on nasal steroid spray.   Try that for 1-2 weeks and if no

## 2017-11-28 NOTE — TELEPHONE ENCOUNTER
Received refill request from [address] [place] [address] in [place] for Fluconazole 150 mg tab.

## 2017-12-11 ENCOUNTER — TELEPHONE (OUTPATIENT)
Dept: FAMILY MEDICINE CLINIC | Facility: CLINIC | Age: 32
End: 2017-12-11

## 2017-12-11 NOTE — TELEPHONE ENCOUNTER
Patient called back and said she was not expecting a call to clarify her message.   She further explained that \"this is ridiculous that I have to talk to a triage nurse now, if you would look in my chart it injured by right shoulder and it is noted by

## 2017-12-11 NOTE — TELEPHONE ENCOUNTER
PT'S RIGHT SHOULDER IS IN WORSE PAIN WAS WONDERING IF IT SHOULD GET RE X -RAYED OR PUT ON A SLING . Bren Wadsworth WOULD LIKE A NURSE TO CALL HER BACK WITH [facility]
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
## 2017-12-12 ENCOUNTER — OFFICE VISIT (OUTPATIENT)
Dept: FAMILY MEDICINE CLINIC | Facility: CLINIC | Age: 32
End: 2017-12-12

## 2017-12-12 DIAGNOSIS — M25.511 RIGHT SHOULDER PAIN, UNSPECIFIED CHRONICITY: Primary | ICD-10-CM

## 2017-12-12 PROCEDURE — 99213 OFFICE O/P EST LOW 20 MIN: CPT | Performed by: FAMILY MEDICINE

## 2017-12-13 RX ORDER — PREDNISONE 20 MG/1
20 TABLET ORAL 2 TIMES DAILY
Qty: 10 TABLET | Refills: 0 | Status: SHIPPED | OUTPATIENT
Start: 2017-12-13 | End: 2017-12-18

## 2017-12-13 NOTE — PROGRESS NOTES
HPI:    Patient ID: Tiara Keen is a 27year old female. HPI  Patient is here with complaint of right shoulder pain to the point where she is not able to move the shoulder very much. She wears an arm sling which helps tremendously.   No trauma

## 2017-12-20 ENCOUNTER — APPOINTMENT (OUTPATIENT)
Dept: CT IMAGING | Age: 32
End: 2017-12-20
Attending: STUDENT IN AN ORGANIZED HEALTH CARE EDUCATION/TRAINING PROGRAM
Payer: COMMERCIAL

## 2017-12-20 ENCOUNTER — APPOINTMENT (OUTPATIENT)
Dept: GENERAL RADIOLOGY | Age: 32
End: 2017-12-20
Attending: STUDENT IN AN ORGANIZED HEALTH CARE EDUCATION/TRAINING PROGRAM
Payer: COMMERCIAL

## 2017-12-20 ENCOUNTER — HOSPITAL ENCOUNTER (EMERGENCY)
Age: 32
Discharge: HOME | End: 2017-12-21
Attending: STUDENT IN AN ORGANIZED HEALTH CARE EDUCATION/TRAINING PROGRAM
Payer: COMMERCIAL

## 2017-12-20 DIAGNOSIS — S39.012A BACK STRAIN, INITIAL ENCOUNTER: ICD-10-CM

## 2017-12-20 DIAGNOSIS — S16.1XXA NECK STRAIN, INITIAL ENCOUNTER: ICD-10-CM

## 2017-12-20 DIAGNOSIS — S20.219A CONTUSION OF CHEST WALL, UNSPECIFIED LATERALITY, INITIAL ENCOUNTER: ICD-10-CM

## 2017-12-20 DIAGNOSIS — M50.20 BULGING OF CERVICAL INTERVERTEBRAL DISC: ICD-10-CM

## 2017-12-20 DIAGNOSIS — R42 DIZZINESS: Primary | ICD-10-CM

## 2017-12-20 PROCEDURE — 72072 X-RAY EXAM THORAC SPINE 3VWS: CPT | Performed by: STUDENT IN AN ORGANIZED HEALTH CARE EDUCATION/TRAINING PROGRAM

## 2017-12-20 PROCEDURE — 99284 EMERGENCY DEPT VISIT MOD MDM: CPT

## 2017-12-20 PROCEDURE — 70450 CT HEAD/BRAIN W/O DYE: CPT | Performed by: STUDENT IN AN ORGANIZED HEALTH CARE EDUCATION/TRAINING PROGRAM

## 2017-12-20 PROCEDURE — 71020 XR CHEST PA + LAT CHEST (CPT=71020): CPT | Performed by: STUDENT IN AN ORGANIZED HEALTH CARE EDUCATION/TRAINING PROGRAM

## 2017-12-20 PROCEDURE — 72125 CT NECK SPINE W/O DYE: CPT | Performed by: STUDENT IN AN ORGANIZED HEALTH CARE EDUCATION/TRAINING PROGRAM

## 2017-12-20 RX ORDER — ACETAMINOPHEN AND CODEINE PHOSPHATE 300; 30 MG/1; MG/1
1 TABLET ORAL ONCE
Status: COMPLETED | OUTPATIENT
Start: 2017-12-20 | End: 2017-12-20

## 2017-12-21 ENCOUNTER — TELEPHONE (OUTPATIENT)
Dept: FAMILY MEDICINE CLINIC | Facility: CLINIC | Age: 32
End: 2017-12-21

## 2017-12-21 VITALS
BODY MASS INDEX: 39.56 KG/M2 | WEIGHT: 215 LBS | SYSTOLIC BLOOD PRESSURE: 122 MMHG | RESPIRATION RATE: 18 BRPM | DIASTOLIC BLOOD PRESSURE: 67 MMHG | HEIGHT: 62 IN | OXYGEN SATURATION: 99 % | HEART RATE: 82 BPM | TEMPERATURE: 98 F

## 2017-12-21 RX ORDER — IBUPROFEN 600 MG/1
600 TABLET ORAL EVERY 8 HOURS PRN
Qty: 30 TABLET | Refills: 0 | Status: SHIPPED | OUTPATIENT
Start: 2017-12-21 | End: 2017-12-28

## 2017-12-21 RX ORDER — CYCLOBENZAPRINE HCL 10 MG
10 TABLET ORAL 3 TIMES DAILY PRN
Qty: 20 TABLET | Refills: 0 | Status: SHIPPED | OUTPATIENT
Start: 2017-12-21 | End: 2017-12-28

## 2017-12-21 RX ORDER — ACETAMINOPHEN AND CODEINE PHOSPHATE 300; 30 MG/1; MG/1
1-2 TABLET ORAL EVERY 4 HOURS PRN
Qty: 20 TABLET | Refills: 0 | Status: SHIPPED | OUTPATIENT
Start: 2017-12-21 | End: 2017-12-28
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
## 2017-12-29 ENCOUNTER — TELEPHONE (OUTPATIENT)
Dept: FAMILY MEDICINE CLINIC | Facility: CLINIC | Age: 32
End: 2017-12-29

## 2017-12-29 ENCOUNTER — HOSPITAL ENCOUNTER (EMERGENCY)
Age: 32
Discharge: HOME | End: 2017-12-29
Attending: EMERGENCY MEDICINE
Payer: COMMERCIAL

## 2017-12-29 VITALS
RESPIRATION RATE: 19 BRPM | BODY MASS INDEX: 39.56 KG/M2 | SYSTOLIC BLOOD PRESSURE: 101 MMHG | DIASTOLIC BLOOD PRESSURE: 55 MMHG | HEIGHT: 62 IN | HEART RATE: 86 BPM | TEMPERATURE: 99 F | OXYGEN SATURATION: 99 % | WEIGHT: 215 LBS

## 2017-12-29 DIAGNOSIS — M62.838 NECK MUSCLE SPASM: ICD-10-CM

## 2017-12-29 DIAGNOSIS — S06.0X1A CONCUSSION WITH LOSS OF CONSCIOUSNESS OF 30 MINUTES OR LESS, INITIAL ENCOUNTER: Primary | ICD-10-CM

## 2017-12-29 PROCEDURE — 99283 EMERGENCY DEPT VISIT LOW MDM: CPT | Performed by: EMERGENCY MEDICINE

## 2017-12-29 RX ORDER — IBUPROFEN 600 MG/1
600 TABLET ORAL EVERY 6 HOURS PRN
COMMUNITY
End: 2018-09-10 | Stop reason: ALTCHOICE

## 2017-12-29 RX ORDER — METHYLPREDNISOLONE 4 MG/1
TABLET ORAL
Qty: 1 PACKAGE | Refills: 0 | Status: SHIPPED | OUTPATIENT
Start: 2017-12-29 | End: 2018-01-03

## 2017-12-29 RX ORDER — ACETAMINOPHEN AND CODEINE PHOSPHATE 300; 30 MG/1; MG/1
1 TABLET ORAL EVERY 4 HOURS PRN
COMMUNITY
End: 2018-06-06

## 2017-12-29 RX ORDER — CYCLOBENZAPRINE HCL 10 MG
10 TABLET ORAL 3 TIMES DAILY PRN
COMMUNITY
End: 2018-02-10
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
## 2017-12-29 NOTE — TELEPHONE ENCOUNTER
Pt called requesting to be transferred to a nurse pt states she is in extreme amount of pain from spasms would like a call back as soon as possible

## 2017-12-29 NOTE — ED PROVIDER NOTES
I reviewed that chart and discussed the case with the physician assistant. I have examined the patient and noted probable neck spasm. May be mild concussion. Patient will be given Medrol Dosepak. Return if worse. .    I agree with the physician Roberta Mckeon
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
## 2017-12-29 NOTE — TELEPHONE ENCOUNTER
LOV: 12/12/17  ASSESSMENT/PLAN:   Right shoulder pain, unspecified chronicity  (primary encounter diagnosis)  I think the patient has frozen shoulder with severe inflammation.   I would recommend prednisone 20 mg twice a day for 5 days, heat alternating wit

## 2017-12-29 NOTE — ED NOTES
Pt states she is pain and symptom free as long as head is in neutral position looking forward. Neck pain and headache when moving head side to side.
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
## 2018-01-25 ENCOUNTER — TELEPHONE (OUTPATIENT)
Dept: FAMILY MEDICINE CLINIC | Facility: CLINIC | Age: 33
End: 2018-01-25

## 2018-01-25 ENCOUNTER — OFFICE VISIT (OUTPATIENT)
Dept: FAMILY MEDICINE CLINIC | Facility: CLINIC | Age: 33
End: 2018-01-25

## 2018-01-25 VITALS
DIASTOLIC BLOOD PRESSURE: 80 MMHG | OXYGEN SATURATION: 99 % | SYSTOLIC BLOOD PRESSURE: 124 MMHG | RESPIRATION RATE: 16 BRPM | HEIGHT: 62 IN | WEIGHT: 227 LBS | HEART RATE: 98 BPM | BODY MASS INDEX: 41.77 KG/M2

## 2018-01-25 DIAGNOSIS — R05.8 PRODUCTIVE COUGH: ICD-10-CM

## 2018-01-25 DIAGNOSIS — M54.2 NECK PAIN: Primary | ICD-10-CM

## 2018-01-25 PROCEDURE — 99214 OFFICE O/P EST MOD 30 MIN: CPT | Performed by: FAMILY MEDICINE

## 2018-01-25 RX ORDER — AMOXICILLIN AND CLAVULANATE POTASSIUM 500; 125 MG/1; MG/1
1 TABLET, FILM COATED ORAL 2 TIMES DAILY
Qty: 14 TABLET | Refills: 0 | Status: SHIPPED | OUTPATIENT
Start: 2018-01-25 | End: 2018-02-01

## 2018-01-25 NOTE — TELEPHONE ENCOUNTER
Pt came in for OV and forgot to mention rash near groin area and if Dr can send prescription for antifungal to preferred pharmacy.

## 2018-01-26 RX ORDER — NYSTATIN 100000 [USP'U]/G
POWDER TOPICAL
Qty: 45 G | Refills: 0 | Status: SHIPPED | OUTPATIENT
Start: 2018-01-26 | End: 2018-02-10

## 2018-01-26 NOTE — TELEPHONE ENCOUNTER
Patient informed of MD recommendations, patient verbalized understanding with intent to comply. No future appointments.     Total time spent on chart: 2 mins

## 2018-01-26 NOTE — TELEPHONE ENCOUNTER
Patient states she has had this groin rash before, Dr. Gian Crowder has advised treatment with lotrimin previously. She has been using it, but it is not helping much - she borrowed her mother's nystatin powder which worked well.   She is wondering if you would g

## 2018-01-26 NOTE — TELEPHONE ENCOUNTER
LM for patient to call back. She will need an office visit to check this even though she was seen yesterday, it was not checked because she did not discuss this. LM to call back and schedule.     Time: 2 min
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
## 2018-02-05 ENCOUNTER — OFFICE VISIT (OUTPATIENT)
Dept: FAMILY MEDICINE CLINIC | Facility: CLINIC | Age: 33
End: 2018-02-05

## 2018-02-05 DIAGNOSIS — J01.00 ACUTE NON-RECURRENT MAXILLARY SINUSITIS: Primary | ICD-10-CM

## 2018-02-05 PROCEDURE — 99213 OFFICE O/P EST LOW 20 MIN: CPT | Performed by: FAMILY MEDICINE

## 2018-02-06 ENCOUNTER — TELEPHONE (OUTPATIENT)
Dept: FAMILY MEDICINE CLINIC | Facility: CLINIC | Age: 33
End: 2018-02-06

## 2018-02-06 RX ORDER — AMOXICILLIN AND CLAVULANATE POTASSIUM 875; 125 MG/1; MG/1
1 TABLET, FILM COATED ORAL 2 TIMES DAILY
Qty: 20 TABLET | Refills: 0 | OUTPATIENT
Start: 2018-02-05 | End: 2018-05-10

## 2018-02-06 NOTE — TELEPHONE ENCOUNTER
[facility] calling as pt is there after her visit yesterday and was told an Abx for a sinus infection would be called over. Read note and verified. Augmentin called in. Pharmacist states PCN allergy present, but she has received Augmentin many times.

## 2018-02-10 ENCOUNTER — OFFICE VISIT (OUTPATIENT)
Dept: FAMILY MEDICINE CLINIC | Facility: CLINIC | Age: 33
End: 2018-02-10

## 2018-02-10 ENCOUNTER — HOSPITAL ENCOUNTER (OUTPATIENT)
Dept: GENERAL RADIOLOGY | Age: 33
Discharge: HOME | End: 2018-02-10
Attending: PHYSICIAN ASSISTANT
Payer: MEDICAID

## 2018-02-10 ENCOUNTER — TELEPHONE (OUTPATIENT)
Dept: FAMILY MEDICINE CLINIC | Facility: CLINIC | Age: 33
End: 2018-02-10

## 2018-02-10 VITALS
TEMPERATURE: 98 F | DIASTOLIC BLOOD PRESSURE: 80 MMHG | OXYGEN SATURATION: 98 % | HEART RATE: 97 BPM | WEIGHT: 227 LBS | BODY MASS INDEX: 41.77 KG/M2 | RESPIRATION RATE: 20 BRPM | SYSTOLIC BLOOD PRESSURE: 118 MMHG | HEIGHT: 62 IN

## 2018-02-10 DIAGNOSIS — M79.641 HAND PAIN, RIGHT: ICD-10-CM

## 2018-02-10 DIAGNOSIS — S69.91XA FINGER INJURY, RIGHT, INITIAL ENCOUNTER: Primary | ICD-10-CM

## 2018-02-10 DIAGNOSIS — S69.91XA FINGER INJURY, RIGHT, INITIAL ENCOUNTER: ICD-10-CM

## 2018-02-10 PROCEDURE — 73130 X-RAY EXAM OF HAND: CPT | Performed by: PHYSICIAN ASSISTANT

## 2018-02-10 PROCEDURE — 99213 OFFICE O/P EST LOW 20 MIN: CPT | Performed by: PHYSICIAN ASSISTANT
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
## 2018-02-10 NOTE — TELEPHONE ENCOUNTER
Hurt her finger yesterday  Cracked off her acrylic nail, and   Has been bleeding ever since. Hard to bend the nail. Hurts a lot and also having a hard time bending the finger. Not sure what to do. Rec- go to prompt care for further evaluation.

## 2018-02-18 ENCOUNTER — OFFICE VISIT (OUTPATIENT)
Dept: FAMILY MEDICINE CLINIC | Facility: CLINIC | Age: 33
End: 2018-02-18

## 2018-02-18 VITALS
DIASTOLIC BLOOD PRESSURE: 58 MMHG | BODY MASS INDEX: 42 KG/M2 | TEMPERATURE: 98 F | WEIGHT: 230 LBS | OXYGEN SATURATION: 98 % | SYSTOLIC BLOOD PRESSURE: 120 MMHG | RESPIRATION RATE: 18 BRPM | HEART RATE: 109 BPM

## 2018-02-18 DIAGNOSIS — J01.40 ACUTE NON-RECURRENT PANSINUSITIS: Primary | ICD-10-CM

## 2018-02-18 DIAGNOSIS — J40 BRONCHITIS: ICD-10-CM

## 2018-02-18 PROCEDURE — 99213 OFFICE O/P EST LOW 20 MIN: CPT | Performed by: NURSE PRACTITIONER

## 2018-02-18 RX ORDER — DOXYCYCLINE 100 MG/1
100 CAPSULE ORAL 2 TIMES DAILY
Qty: 14 CAPSULE | Refills: 0 | Status: SHIPPED | OUTPATIENT
Start: 2018-02-18 | End: 2018-02-25

## 2018-02-18 NOTE — PROGRESS NOTES
CHIEF COMPLAINT:   Patient presents with:  Sinus Problem: pt c\o of poss sinus inf       HPI:   Marybeth Moody is a 27year old female who presents for cold symptoms for  16  days.  She first presented to PCP on 2/5/18 for sinus pain and given augmen Packs/day: 1.00      Years: 0.00         Types: Cigarettes  Smokeless tobacco: Never Used                      Alcohol use:  No                  REVIEW OF SYSTEMS:   GENERAL:  denies  diminished appetite  SKIN: no rashes or abnormal skin lesions  HEENT: Risks, benefits, side effects of medication addressed and explained. Patient Instructions     Smoking cessation discussed. Patient ready to quit. Advised to follow up with PCP to discuss potential cessation aids such as wellbutrin.       Sinusitis (Ant · Over-the-counter decongestants may be used unless a similar medicine was prescribed. Nasal sprays work the fastest. Use one that contains phenylephrine or oxymetazoline. First blow the nose gently. Then use the spray.  Do not use these medicines more ofte © 5031-6956 [address]. [address] [facility], [facility] [place]. All rights reserved. This information is not intended as a substitute for professional medical care. Always follow your healthcare professional's instructions.         Viral B · Your appetite may be poor, so a light diet is fine. Avoid dehydration by drinking 6 to 8 glasses of fluids per day (such as water, soft drinks, sports drinks, juices, tea, or soup). Extra fluids will help loosen secretions in the nose and lungs.   · Over- Sinusitis can often be managed with self-care. Self-care can keep sinuses moist and make you feel more comfortable. Remember to follow your doctor's instructions closely. This can make a big difference in getting your sinus problem under control.   Drink fl Smoking is one of the hardest habits to break. About half of all people who have ever smoked have been able to quit. Most people who still smoke want to quit. Here are some of the best ways to stop smoking.     Keep trying  Most smokers make many attempts a After reviewing your smoking patterns and past attempts to quit, your doctor may offer a prescription medicine such as bupropion, varenicline, a nicotine inhaler, or nasal spray. Each has advantages and side effects. Your doctor can review these with you.

## 2018-02-18 NOTE — PATIENT INSTRUCTIONS
Smoking cessation discussed. Patient ready to quit. Advised to follow up with PCP to discuss potential cessation aids such as wellbutrin. Sinusitis (Antibiotic Treatment)    The sinuses are air-filled spaces within the bones of the face.  They connec · Over-the-counter antihistamines may help if allergies contributed to your sinusitis.    · Do not use nasal rinses or irrigation during an acute sinus infection, unless told to by your health care provider.  Rinsing may spread the infection to other sinuse Bronchitis that is caused by a virus is not treated with antibiotics. Instead, medicines may be given to help relieve symptoms. Symptoms can last up to 2 weeks, although the cough may last much longer.   This illness is contagious during the first few days If you are age 72 or older, or if you have a chronic lung disease or condition that affects your immune system, or you smoke, ask your healthcare provider about getting a pneumococcal vaccine and a yearly flu shot (influenza vaccine).   When to seek medical Apply hot or cold packs  Applying heat to the area surrounding your sinuses may make you feel more comfortable. Use a hot water bottle or a hand towel dipped in hot water. Some people also find ice packs effective for relieving pain.   Medicines  Your docto · American Lung Association: (979.889.4359). · [address] (562-071-1878). Support at home is important too. Nonsmokers can offer praise and encouragement. If the smoker in your life finds it hard to quit, encourage them to keep trying.   Over · \"Clearing the Air\" booklet from the [address]: smokefree.gov/sites/default/files/pdf/clearing-the-air-accessible. pdf  Date Last Reviewed: 3/1/2017  © 9262-4866 [address]. [address] [facility], [address] [facility].  All ri

## 2018-03-07 ENCOUNTER — TELEPHONE (OUTPATIENT)
Dept: FAMILY MEDICINE CLINIC | Facility: CLINIC | Age: 33
End: 2018-03-07

## 2018-03-07 ENCOUNTER — HOSPITAL ENCOUNTER (EMERGENCY)
Age: 33
Discharge: HOME | End: 2018-03-07
Attending: EMERGENCY MEDICINE
Payer: MEDICAID

## 2018-03-07 ENCOUNTER — APPOINTMENT (OUTPATIENT)
Dept: GENERAL RADIOLOGY | Age: 33
End: 2018-03-07
Attending: EMERGENCY MEDICINE
Payer: MEDICAID

## 2018-03-07 VITALS
SYSTOLIC BLOOD PRESSURE: 138 MMHG | TEMPERATURE: 98 F | RESPIRATION RATE: 20 BRPM | HEART RATE: 98 BPM | OXYGEN SATURATION: 99 % | DIASTOLIC BLOOD PRESSURE: 76 MMHG

## 2018-03-07 DIAGNOSIS — S90.31XA CONTUSION OF RIGHT FOOT, INITIAL ENCOUNTER: Primary | ICD-10-CM

## 2018-03-07 PROCEDURE — 73630 X-RAY EXAM OF FOOT: CPT | Performed by: EMERGENCY MEDICINE

## 2018-03-07 PROCEDURE — 99283 EMERGENCY DEPT VISIT LOW MDM: CPT
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
## 2018-03-07 NOTE — TELEPHONE ENCOUNTER
Patient states that for the past 4-5 days her R leg, from the hip down, has been numb and painful. Patient vomitted today and is very nauseated. She would like to know if this is cause for concern.

## 2018-03-08 NOTE — TELEPHONE ENCOUNTER
Pt stating she went to [place] urgent care as discussed with damien but doctor did nothing would like damien to give her a call back with advise

## 2018-03-08 NOTE — ED INITIAL ASSESSMENT (HPI)
Pt states she is having pain to the right foot that radiates up her leg after stepping on dog bone approx 1.5 weeks PTA.

## 2018-03-08 NOTE — ED PROVIDER NOTES
Patient Seen in: [facility] In [place]    History   Patient presents with: Foot Injury    Stated Complaint:     HPI    68-year-old white female who presents emergency room today for complaint of right foot pain.   Patient states about acute discomfort or distress. Vital signs show she is mildly tachycardic otherwise vital signs are stable she is afebrile  Right foot exam shows no swelling or ecchymosis of the right foot. No puncture wounds noted in the plantar surface of her foot.   Cesar Rhodes days          Medications Prescribed:  Discharge Medication List as of 3/7/2018  8:38 PM
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
## 2018-03-08 NOTE — TELEPHONE ENCOUNTER
Future Appointments  Date Time Provider Harriet Arabella   3/8/2018 4:45 PM Dagmar Alcantar MD [facility] [address] [facility] [address]     PT scheduled OV with Dr Vladimir Pineda

## 2018-03-09 ENCOUNTER — TELEPHONE (OUTPATIENT)
Dept: FAMILY MEDICINE CLINIC | Facility: CLINIC | Age: 33
End: 2018-03-09

## 2018-03-09 NOTE — TELEPHONE ENCOUNTER
Pt says she has been getting 10 phone calls the past 2 days regarding [address] [place] [address] that are due as well as appt reminders.   Future Appointments  Date Time Provider Harriet Bell   3/12/2018 4:30 PM Cristi Zelaya MD [facility] [address] [facility] [address]

## 2018-03-12 ENCOUNTER — OFFICE VISIT (OUTPATIENT)
Dept: FAMILY MEDICINE CLINIC | Facility: CLINIC | Age: 33
End: 2018-03-12

## 2018-03-12 VITALS
TEMPERATURE: 99 F | HEIGHT: 62 IN | BODY MASS INDEX: 42.33 KG/M2 | WEIGHT: 230 LBS | DIASTOLIC BLOOD PRESSURE: 70 MMHG | HEART RATE: 95 BPM | RESPIRATION RATE: 12 BRPM | SYSTOLIC BLOOD PRESSURE: 122 MMHG

## 2018-03-12 DIAGNOSIS — S93.401A SPRAIN OF RIGHT ANKLE, UNSPECIFIED LIGAMENT, INITIAL ENCOUNTER: Primary | ICD-10-CM

## 2018-03-12 DIAGNOSIS — M79.671 PAIN OF RIGHT HEEL: ICD-10-CM

## 2018-03-12 PROCEDURE — 99213 OFFICE O/P EST LOW 20 MIN: CPT | Performed by: FAMILY MEDICINE

## 2018-03-12 RX ORDER — ETODOLAC 400 MG/1
400 TABLET, FILM COATED ORAL 2 TIMES DAILY
Qty: 30 TABLET | Refills: 0 | Status: SHIPPED | OUTPATIENT
Start: 2018-03-12 | End: 2018-06-06

## 2018-03-12 NOTE — PROGRESS NOTES
HPI:    Patient ID: Sharon Solorzano is a 27year old female. HPI  Patient is here for follow-up of ER visit for right ankle sprain and heel pain. She stepped on a dog a bone her leg.   She also had some shooting pain from her right lower back in t daily.           Imaging & Referrals:  None       #[id number]

## 2018-03-13 ENCOUNTER — TELEPHONE (OUTPATIENT)
Dept: FAMILY MEDICINE CLINIC | Facility: CLINIC | Age: 33
End: 2018-03-13

## 2018-03-13 NOTE — TELEPHONE ENCOUNTER
Prior auth for etodolac denied by [employer]. Covered drugs are as follows:  celebrex cap  Diclofenac   Indomethacin  meloxicam   Naproxen      Please send one of the above medications.

## 2018-03-19 ENCOUNTER — OFFICE VISIT (OUTPATIENT)
Dept: FAMILY MEDICINE CLINIC | Facility: CLINIC | Age: 33
End: 2018-03-19

## 2018-03-19 VITALS
BODY MASS INDEX: 42.33 KG/M2 | DIASTOLIC BLOOD PRESSURE: 60 MMHG | RESPIRATION RATE: 14 BRPM | HEART RATE: 104 BPM | WEIGHT: 230 LBS | OXYGEN SATURATION: 98 % | SYSTOLIC BLOOD PRESSURE: 118 MMHG | HEIGHT: 62 IN | TEMPERATURE: 98 F

## 2018-03-19 DIAGNOSIS — M25.572 LEFT ANKLE PAIN, UNSPECIFIED CHRONICITY: Primary | ICD-10-CM

## 2018-03-19 DIAGNOSIS — M79.672 LEFT FOOT PAIN: ICD-10-CM

## 2018-03-19 DIAGNOSIS — J30.9 ALLERGIC RHINITIS, UNSPECIFIED SEASONALITY, UNSPECIFIED TRIGGER: ICD-10-CM

## 2018-03-19 PROCEDURE — 99213 OFFICE O/P EST LOW 20 MIN: CPT | Performed by: FAMILY MEDICINE

## 2018-03-19 RX ORDER — MEDROXYPROGESTERONE ACETATE 150 MG/ML
INJECTION, SUSPENSION INTRAMUSCULAR
COMMUNITY
Start: 2018-01-31 | End: 2019-04-26 | Stop reason: ALTCHOICE

## 2018-03-19 RX ORDER — FLUOXETINE HYDROCHLORIDE 40 MG/1
1 CAPSULE ORAL
COMMUNITY
End: 2018-06-19 | Stop reason: ALTCHOICE

## 2018-03-19 NOTE — PROGRESS NOTES
HPI:    Patient ID: Cherise Ann is a 56 [address] old female. HPI  Patient is here for follow-up of left ankle and foot pain. She is getting somewhat better. Pain is less and swelling is less. She does have ankle support brace.   She did initiall entry is adequate, no accessory respiratory muscle use, no chest asymmetry, normal excursion. GI:  bowel sound positive in all four quadrants, abdomen is soft, non-tender, non-distended, no hepatosplenomegaly, no abnormal aortic pulsation.    MS: Mild tend

## 2018-03-20 NOTE — TELEPHONE ENCOUNTER
I can set a temporary block on the health maintenance calls, but it will resume in a few months. Blocked for 90 days. Per chelsi no record of reminder calls. ...

## 2018-04-02 ENCOUNTER — OFFICE VISIT (OUTPATIENT)
Dept: FAMILY MEDICINE CLINIC | Facility: CLINIC | Age: 33
End: 2018-04-02

## 2018-04-02 VITALS
RESPIRATION RATE: 16 BRPM | DIASTOLIC BLOOD PRESSURE: 72 MMHG | OXYGEN SATURATION: 99 % | HEIGHT: 62 IN | BODY MASS INDEX: 41.96 KG/M2 | SYSTOLIC BLOOD PRESSURE: 116 MMHG | HEART RATE: 64 BPM | WEIGHT: 228 LBS

## 2018-04-02 DIAGNOSIS — J30.9 ALLERGIC RHINITIS, UNSPECIFIED SEASONALITY, UNSPECIFIED TRIGGER: ICD-10-CM

## 2018-04-02 DIAGNOSIS — M79.671 RIGHT FOOT PAIN: ICD-10-CM

## 2018-04-02 DIAGNOSIS — J06.9 VIRAL UPPER RESPIRATORY TRACT INFECTION: ICD-10-CM

## 2018-04-02 DIAGNOSIS — M25.571 ACUTE RIGHT ANKLE PAIN: Primary | ICD-10-CM

## 2018-04-02 PROCEDURE — 99214 OFFICE O/P EST MOD 30 MIN: CPT | Performed by: FAMILY MEDICINE

## 2018-04-02 NOTE — PROGRESS NOTES
HPI:    Patient ID: Hi Beth is a 27year old female. HPI  Patient is here for follow-up of right foot and ankle pain. Swelling is still there but is getting somewhat better. She did try diclofenac but it did not seem to help much.   She h hepatosplenomegaly, no abnormal aortic pulsation.    MS: Right foot and ankle with mild swelling noted and mild to moderate tenderness on palpation of the anterior ankle and inferior aspect of ankle on lateral side 2+ pedal pulses noted no bruising noted

## 2018-04-02 NOTE — PATIENT INSTRUCTIONS
Please continue nasal steroid spray 2 sprays in each nostril once a day at bedtime. Please use Sudafed once or twice a day as needed for lightheadedness as you do have some slight fluid buildup in both ears.     Use ankle support brace as needed and ic

## 2018-04-06 ENCOUNTER — OFFICE VISIT (OUTPATIENT)
Dept: FAMILY MEDICINE CLINIC | Facility: CLINIC | Age: 33
End: 2018-04-06

## 2018-04-06 VITALS
BODY MASS INDEX: 41.77 KG/M2 | WEIGHT: 227 LBS | OXYGEN SATURATION: 98 % | HEIGHT: 62 IN | DIASTOLIC BLOOD PRESSURE: 74 MMHG | RESPIRATION RATE: 16 BRPM | HEART RATE: 81 BPM | SYSTOLIC BLOOD PRESSURE: 118 MMHG

## 2018-04-06 DIAGNOSIS — G43.809 OTHER MIGRAINE WITHOUT STATUS MIGRAINOSUS, NOT INTRACTABLE: Primary | ICD-10-CM

## 2018-04-06 PROCEDURE — 99214 OFFICE O/P EST MOD 30 MIN: CPT | Performed by: FAMILY MEDICINE

## 2018-04-09 NOTE — PROGRESS NOTES
HPI:    Patient ID: Dalton Rivera is a 27year old female. HPI  Patient is here for getting a form filled out due to having tent on the front of her windows. She states she has migraine headaches and thus the 10 helps her.   She was pulled over hepatosplenomegaly, no abnormal aortic pulsation. MS:  No paraspinal or spinal tenderness, negative straight leg raise bilaterally, no joint swelling or tenderness, normal strength, range of motion and sensation in all four extremities.   Neurologic:  aler

## 2018-04-16 ENCOUNTER — OFFICE VISIT (OUTPATIENT)
Dept: FAMILY MEDICINE CLINIC | Facility: CLINIC | Age: 33
End: 2018-04-16

## 2018-04-16 VITALS
HEART RATE: 89 BPM | DIASTOLIC BLOOD PRESSURE: 68 MMHG | BODY MASS INDEX: 41.77 KG/M2 | HEIGHT: 62 IN | WEIGHT: 227 LBS | RESPIRATION RATE: 12 BRPM | SYSTOLIC BLOOD PRESSURE: 120 MMHG | TEMPERATURE: 98 F | OXYGEN SATURATION: 98 %

## 2018-04-16 DIAGNOSIS — M79.671 RIGHT FOOT PAIN: Primary | ICD-10-CM

## 2018-04-16 DIAGNOSIS — J06.9 UPPER RESPIRATORY INFECTION WITH COUGH AND CONGESTION: ICD-10-CM

## 2018-04-16 DIAGNOSIS — M25.571 ACUTE RIGHT ANKLE PAIN: ICD-10-CM

## 2018-04-16 PROCEDURE — 99213 OFFICE O/P EST LOW 20 MIN: CPT | Performed by: FAMILY MEDICINE

## 2018-04-16 NOTE — PROGRESS NOTES
HPI:    Patient ID: Hi Beth is a 27year old female. HPI  Patient is here for follow-up of right ankle and foot pain.   She states it is better and she does not need medication but it does hurt on the arch part of the foot and around the la rales, air entry is adequate, no accessory respiratory muscle use, no chest asymmetry, normal excursion.          ASSESSMENT/PLAN:   Right foot pain  (primary encounter diagnosis)  Acute right ankle pain  Upper respiratory infection with cough and congestio

## 2018-05-10 ENCOUNTER — OFFICE VISIT (OUTPATIENT)
Dept: FAMILY MEDICINE CLINIC | Facility: CLINIC | Age: 33
End: 2018-05-10

## 2018-05-10 VITALS
WEIGHT: 227 LBS | OXYGEN SATURATION: 99 % | DIASTOLIC BLOOD PRESSURE: 78 MMHG | TEMPERATURE: 98 F | BODY MASS INDEX: 41.77 KG/M2 | RESPIRATION RATE: 12 BRPM | SYSTOLIC BLOOD PRESSURE: 118 MMHG | HEART RATE: 94 BPM | HEIGHT: 62 IN

## 2018-05-10 DIAGNOSIS — J01.00 ACUTE NON-RECURRENT MAXILLARY SINUSITIS: Primary | ICD-10-CM

## 2018-05-10 PROCEDURE — 99213 OFFICE O/P EST LOW 20 MIN: CPT | Performed by: FAMILY MEDICINE

## 2018-05-10 RX ORDER — ONDANSETRON 4 MG/1
4 TABLET, ORALLY DISINTEGRATING ORAL EVERY 8 HOURS PRN
Qty: 6 TABLET | Refills: 0 | Status: SHIPPED | OUTPATIENT
Start: 2018-05-10 | End: 2018-06-06

## 2018-05-10 RX ORDER — AMOXICILLIN AND CLAVULANATE POTASSIUM 875; 125 MG/1; MG/1
1 TABLET, FILM COATED ORAL 2 TIMES DAILY
Qty: 20 TABLET | Refills: 0 | Status: SHIPPED | OUTPATIENT
Start: 2018-05-10 | End: 2018-05-20

## 2018-05-10 NOTE — PROGRESS NOTES
CHIEF COMPLAINT:   Patient has complaint of sinus pain, pressure, congestion. HPI:   Patient has complaint of sinus pain, pressure, congestion, and nasal discharge for the past 3 day(s) and severity is moderate.   Patient does not have a fever with a tem Acute non-recurrent maxillary sinusitis  (primary encounter diagnosis)  Plan:         Amoxicillin-Pot Clavulanate 875-125 MG Oral Tab Take 1 tablet by mouth 2 (two) times daily.  Disp: 20 tablet Rfl: 0   ondansetron 4 MG Oral Tablet Dispersible Take 1 table

## 2018-06-06 ENCOUNTER — OFFICE VISIT (OUTPATIENT)
Dept: FAMILY MEDICINE CLINIC | Facility: CLINIC | Age: 33
End: 2018-06-06

## 2018-06-06 VITALS
RESPIRATION RATE: 16 BRPM | WEIGHT: 222 LBS | HEART RATE: 100 BPM | SYSTOLIC BLOOD PRESSURE: 110 MMHG | OXYGEN SATURATION: 98 % | TEMPERATURE: 98 F | DIASTOLIC BLOOD PRESSURE: 72 MMHG | HEIGHT: 62 IN | BODY MASS INDEX: 40.85 KG/M2

## 2018-06-06 DIAGNOSIS — J01.01 ACUTE RECURRENT MAXILLARY SINUSITIS: Primary | ICD-10-CM

## 2018-06-06 PROCEDURE — 99213 OFFICE O/P EST LOW 20 MIN: CPT | Performed by: NURSE PRACTITIONER

## 2018-06-06 PROCEDURE — 81025 URINE PREGNANCY TEST: CPT | Performed by: NURSE PRACTITIONER

## 2018-06-06 RX ORDER — DOXYCYCLINE HYCLATE 100 MG/1
100 CAPSULE ORAL 2 TIMES DAILY
Qty: 14 CAPSULE | Refills: 0 | Status: SHIPPED | OUTPATIENT
Start: 2018-06-06 | End: 2018-06-13

## 2018-06-06 RX ORDER — FLUTICASONE PROPIONATE 50 MCG
2 SPRAY, SUSPENSION (ML) NASAL DAILY
Qty: 1 BOTTLE | Refills: 0 | Status: SHIPPED | OUTPATIENT
Start: 2018-06-06 | End: 2020-03-04

## 2018-06-06 NOTE — PROGRESS NOTES
CHIEF COMPLAINT:   Patient presents with:  Sinus Problem: sinus drainage and congestion      HPI:   Sofia Phillips is a 27year old female who presents for cold symptoms for  3  weeks.  Symptoms have progressed into sinus congestion and been worsenin Packs/day: 1.00      Years: 0.00         Types: Cigarettes  Smokeless tobacco: Never Used                      Alcohol use: No                  REVIEW OF SYSTEMS:   GENERAL:  normal appetite  SKIN: no rashes or abnormal skin lesions  HEENT: See HPI. Doxycycline Hyclate 100 MG Oral Cap 14 capsule 0      Sig: Take 1 capsule (100 mg total) by mouth 2 (two) times daily. Fluticasone Propionate 50 MCG/ACT Nasal Suspension 1 Bottle 0      Si sprays by Each Nare route daily.            Risks, benefit · Over-the-counter decongestants may be used unless a similar medicine was prescribed. Nasal sprays work the fastest. Use one that contains phenylephrine or oxymetazoline. First blow the nose gently. Then use the spray.  Do not use these medicines more ofte © 3055-7186 [employer] [address]. [address] [facility], [facility] [place]. All rights reserved. This information is not intended as a substitute for professional medical care. Always follow your healthcare professional's instructions.             The

## 2018-06-19 ENCOUNTER — OFFICE VISIT (OUTPATIENT)
Dept: FAMILY MEDICINE CLINIC | Facility: CLINIC | Age: 33
End: 2018-06-19

## 2018-06-19 VITALS
RESPIRATION RATE: 16 BRPM | HEIGHT: 62 IN | HEART RATE: 96 BPM | BODY MASS INDEX: 40.85 KG/M2 | TEMPERATURE: 98 F | DIASTOLIC BLOOD PRESSURE: 60 MMHG | SYSTOLIC BLOOD PRESSURE: 110 MMHG | WEIGHT: 222 LBS | OXYGEN SATURATION: 97 %

## 2018-06-19 DIAGNOSIS — L02.429 BOIL, THIGH: Primary | ICD-10-CM

## 2018-06-19 PROCEDURE — 99213 OFFICE O/P EST LOW 20 MIN: CPT | Performed by: NURSE PRACTITIONER

## 2018-06-19 RX ORDER — DOXYCYCLINE HYCLATE 100 MG/1
100 CAPSULE ORAL 2 TIMES DAILY
Qty: 14 CAPSULE | Refills: 0 | Status: SHIPPED | OUTPATIENT
Start: 2018-06-19 | End: 2018-06-26

## 2018-06-19 NOTE — PATIENT INSTRUCTIONS
· Please start Doxycycline by mouth as directed. · Use soap and water to clean area, cover with triple antibiotic and bandage. Warm compress over the area will help drain. Avoid picking or squeezing the area more. · See below for care.  Follow up with saroj severe case. · Good hygiene. Proper handwashing can prevent boils from spreading and coming back. Also wash things that have been in contact with the carbuncle in hot water. This includes items such as clothing and towels.   Complications of carbuncles  Th

## 2018-06-19 NOTE — PROGRESS NOTES
HPI:    Patient ID: Damaris Ahumada is a 27year old female. Patient presents with one week history of \"painful lump\" to upper right thigh below underwear line. Has tried to \"pop\" the area with no relief.         Review of Systems   Skin: Medication use and risk/benefit discussed. Skin care as below. Follow up with primary care if not improving in one week. Patient verbalized understanding and agrees to plan. Patient Instructions     · Please start Doxycycline by mouth as directed.   · Use · Antibiotics. In some cases, oral antibiotics may be prescribed to fight the infection, especially if the carbuncle returns. You will likely have to take the medicine for 5 to 7 days. You may need to take it longer for a severe case. · Good hygiene.  Prop

## 2018-06-23 ENCOUNTER — APPOINTMENT (OUTPATIENT)
Dept: GENERAL RADIOLOGY | Age: 33
End: 2018-06-23
Attending: EMERGENCY MEDICINE
Payer: MEDICAID

## 2018-06-23 ENCOUNTER — HOSPITAL ENCOUNTER (EMERGENCY)
Age: 33
Discharge: HOME | End: 2018-06-23
Attending: EMERGENCY MEDICINE
Payer: MEDICAID

## 2018-06-23 VITALS
WEIGHT: 222 LBS | RESPIRATION RATE: 16 BRPM | OXYGEN SATURATION: 98 % | SYSTOLIC BLOOD PRESSURE: 110 MMHG | DIASTOLIC BLOOD PRESSURE: 64 MMHG | HEART RATE: 78 BPM | HEIGHT: 62 IN | TEMPERATURE: 99 F | BODY MASS INDEX: 40.85 KG/M2

## 2018-06-23 DIAGNOSIS — S20.229A CONTUSION OF BACK, UNSPECIFIED LATERALITY, INITIAL ENCOUNTER: Primary | ICD-10-CM

## 2018-06-23 PROCEDURE — 73660 X-RAY EXAM OF TOE(S): CPT | Performed by: EMERGENCY MEDICINE

## 2018-06-23 PROCEDURE — 99284 EMERGENCY DEPT VISIT MOD MDM: CPT

## 2018-06-23 PROCEDURE — 73030 X-RAY EXAM OF SHOULDER: CPT | Performed by: EMERGENCY MEDICINE

## 2018-06-23 PROCEDURE — 72190 X-RAY EXAM OF PELVIS: CPT | Performed by: EMERGENCY MEDICINE

## 2018-06-23 PROCEDURE — 72110 X-RAY EXAM L-2 SPINE 4/>VWS: CPT | Performed by: EMERGENCY MEDICINE

## 2018-06-23 RX ORDER — ACETAMINOPHEN AND CODEINE PHOSPHATE 300; 30 MG/1; MG/1
1-2 TABLET ORAL EVERY 4 HOURS PRN
Qty: 10 TABLET | Refills: 0 | Status: SHIPPED | OUTPATIENT
Start: 2018-06-23 | End: 2018-06-30

## 2018-06-24 NOTE — ED PROVIDER NOTES
Patient Seen in: THE [facility] In [place]    History   Patient presents with:  Fall (musculoskeletal, neurologic)  Back Pain (musculoskeletal)  Upper Extremity Injury (musculoskeletal)    Stated Complaint: Hilda Love last night- lower back jayne and cooperative  Skin: Warm and dry without cyanosis or pallor  HEENT: Atraumatic  Neck: Supple and nontender. No swelling or bruising  Lungs: Clear  Chest: Nontender  Left shoulder: Tender over the posterior aspect of the glenohumeral joint.   No tenderne Follow-up if not improving in the next few days or if other new problems occur.           Disposition and Plan     Clinical Impression:  Contusion of back, unspecified laterality, initial encounter  (primary encounter diagnosis)    Disposition:  Discharge

## 2018-07-18 ENCOUNTER — TELEPHONE (OUTPATIENT)
Dept: FAMILY MEDICINE CLINIC | Facility: CLINIC | Age: 33
End: 2018-07-18

## 2018-07-18 NOTE — TELEPHONE ENCOUNTER
Patient called stating she and her son have several bites all over her feet and a couple on her leg and arms. Her son has the bites on his neck and cheek.  States hers itches terribly and that the bites are smaller than mosquito bites and does not think are

## 2018-07-18 NOTE — TELEPHONE ENCOUNTER
Called patient to discuss further, she reports that over the past few days she noticed on the right foot \"pimple sized bumps\" increases with itchiness to the size of a mosquito bite but then goes back down.  Rash is spreading to the Left foot which is swo

## 2018-07-19 ENCOUNTER — OFFICE VISIT (OUTPATIENT)
Dept: FAMILY MEDICINE CLINIC | Facility: CLINIC | Age: 33
End: 2018-07-19
Payer: MEDICAID

## 2018-07-19 VITALS
TEMPERATURE: 98 F | HEIGHT: 62 IN | HEART RATE: 100 BPM | RESPIRATION RATE: 16 BRPM | OXYGEN SATURATION: 98 % | DIASTOLIC BLOOD PRESSURE: 74 MMHG | WEIGHT: 223 LBS | SYSTOLIC BLOOD PRESSURE: 118 MMHG | BODY MASS INDEX: 41.04 KG/M2
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
## 2018-07-19 PROCEDURE — 99213 OFFICE O/P EST LOW 20 MIN: CPT | Performed by: NURSE PRACTITIONER

## 2018-07-19 NOTE — PROGRESS NOTES
CHIEF COMPLAINT:   Patient presents with:  Rash: itchy x 2-3 days         HPI:   Harry Nielsen is a 26year old female who presents for evaluation of insect bites. Bites started 2 days ago.  Pt has few bites to her right foot, one on her right knee, Packs/day: 1.00      Years: 0.00         Types: Cigarettes  Smokeless tobacco: Never Used                      Alcohol use: No                  REVIEW OF SYSTEMS:   GENERAL: feels well otherwise, no fever, no chills. SKIN: Per HPI. No edema.  No ul

## 2018-07-19 NOTE — TELEPHONE ENCOUNTER
Unfortunately, we would not be able to see them until Tuesday of next week, there are no openings. I would strongly recommend that she get it checked out at a walk in clinic.

## 2018-07-23 ENCOUNTER — OFFICE VISIT (OUTPATIENT)
Dept: FAMILY MEDICINE CLINIC | Facility: CLINIC | Age: 33
End: 2018-07-23
Payer: MEDICAID

## 2018-07-23 VITALS
OXYGEN SATURATION: 99 % | TEMPERATURE: 98 F | HEART RATE: 101 BPM | SYSTOLIC BLOOD PRESSURE: 118 MMHG | RESPIRATION RATE: 14 BRPM | DIASTOLIC BLOOD PRESSURE: 68 MMHG

## 2018-07-23 DIAGNOSIS — J30.9 ALLERGIC RHINITIS, UNSPECIFIED SEASONALITY, UNSPECIFIED TRIGGER: Primary | ICD-10-CM

## 2018-07-23 PROCEDURE — 99213 OFFICE O/P EST LOW 20 MIN: CPT | Performed by: FAMILY MEDICINE

## 2018-07-23 NOTE — PROGRESS NOTES
HPI:    Patient ID: Sofia Phillips is a 26year old female. HPI  Patient is here with complaint of having some seasonal allergies and ear fullness off and on. She is using nasal decongestant spray with minimal relief.   Review of Systems  Negativ

## 2018-08-10 ENCOUNTER — OFFICE VISIT (OUTPATIENT)
Dept: FAMILY MEDICINE CLINIC | Facility: CLINIC | Age: 33
End: 2018-08-10
Payer: MEDICAID

## 2018-08-10 VITALS
HEART RATE: 86 BPM | TEMPERATURE: 98 F | DIASTOLIC BLOOD PRESSURE: 68 MMHG | OXYGEN SATURATION: 98 % | BODY MASS INDEX: 42.29 KG/M2 | SYSTOLIC BLOOD PRESSURE: 124 MMHG | HEIGHT: 61 IN | RESPIRATION RATE: 16 BRPM | WEIGHT: 224 LBS

## 2018-08-10 DIAGNOSIS — L03.818 CELLULITIS OF OTHER SPECIFIED SITE: Primary | ICD-10-CM

## 2018-08-10 DIAGNOSIS — T30.4 CHEMICAL BURN: ICD-10-CM

## 2018-08-10 PROCEDURE — 99213 OFFICE O/P EST LOW 20 MIN: CPT | Performed by: FAMILY MEDICINE

## 2018-08-10 RX ORDER — CEFADROXIL 500 MG/1
500 CAPSULE ORAL 2 TIMES DAILY
Qty: 20 CAPSULE | Refills: 0 | Status: SHIPPED | OUTPATIENT
Start: 2018-08-10 | End: 2018-09-10 | Stop reason: ALTCHOICE

## 2018-08-10 NOTE — PATIENT INSTRUCTIONS
Cellulitis  Cellulitis is an infection of the deep layers of skin. A break in the skin, such as a cut or scratch, can let bacteria under the skin. If the bacteria get to deep layers of the skin, it can be serious.  If not treated, cellulitis can get into · Fever higher of 100.4º F (38.0º C) or higher after 2 days on antibiotics  Date Last Reviewed: 9/1/2016  © 9000-5201 [employer] [address]. [address] [place], [address] [place] [address]. All rights reserved.  This information is not intended as a substitut

## 2018-08-10 NOTE — PROGRESS NOTES
CHIEF COMPLAINT:   Patient presents with:  Burn (integumentary): Left arm      HPI:     Nallely Maldonado is a 26year old female who presents with concerns of chemical Burn on left arm. Patient first noticed symptoms 1  days ago.   Reports erythema, in CARDIOVASCULAR: denies chest pain on exertion or rest.  GI: no nausea, no vomiting or abdominal pain  NEURO: no abnormal sensation, no tingling of the skin or numbness.     EXAM:   /68 (BP Location: Right arm, Patient Position: Sitting, Cuff Size: marisol Cellulitis is treated with antibiotics taken for 7 to 10 days. An open sore may be cleaned and covered with cool wet gauze. Symptoms should get better 1 to 2 days after treatment is started.  Make sure to take all the antibiotics for the full number of days

## 2018-08-28 ENCOUNTER — TELEPHONE (OUTPATIENT)
Dept: FAMILY MEDICINE CLINIC | Facility: CLINIC | Age: 33
End: 2018-08-28

## 2018-08-28 ENCOUNTER — OFFICE VISIT (OUTPATIENT)
Dept: FAMILY MEDICINE CLINIC | Facility: CLINIC | Age: 33
End: 2018-08-28
Payer: MEDICAID

## 2018-08-28 VITALS
SYSTOLIC BLOOD PRESSURE: 120 MMHG | RESPIRATION RATE: 12 BRPM | TEMPERATURE: 99 F | BODY MASS INDEX: 42 KG/M2 | OXYGEN SATURATION: 100 % | HEART RATE: 87 BPM | DIASTOLIC BLOOD PRESSURE: 70 MMHG | WEIGHT: 223.5 LBS

## 2018-08-28 DIAGNOSIS — R20.0 NUMBNESS AND TINGLING OF RIGHT LOWER EXTREMITY: ICD-10-CM

## 2018-08-28 DIAGNOSIS — R20.2 NUMBNESS AND TINGLING OF LEFT UPPER EXTREMITY: Primary | ICD-10-CM

## 2018-08-28 DIAGNOSIS — R20.2 NUMBNESS AND TINGLING OF RIGHT LOWER EXTREMITY: ICD-10-CM

## 2018-08-28 DIAGNOSIS — R20.0 NUMBNESS AND TINGLING OF LEFT UPPER EXTREMITY: Primary | ICD-10-CM

## 2018-08-28 PROCEDURE — 99213 OFFICE O/P EST LOW 20 MIN: CPT | Performed by: FAMILY MEDICINE

## 2018-08-28 NOTE — TELEPHONE ENCOUNTER
Patient called  At 8:50 a.m. This morning to cancel her 11 a.m. appt today with Dr. Mark Carrera. I advised I would cancel the appt and that she may incur a $25 fee for late cancellation due to 24 cancellation policy.  Patient advised me she would not incur a fee

## 2018-08-28 NOTE — TELEPHONE ENCOUNTER
Patient called back to re-instate her 11 am appt and demanded to speak to Gaston. I advised Gaston the patient was on the phone and wanted her appt today.  Gaston spoke to Franki Fong and Franki Fong advised me to schedule her appt as we would have to give a 30

## 2018-08-30 NOTE — PROGRESS NOTES
HPI:    Patient ID: Shashi Lombardo is a 26year old female.     HPI  Patient presents with:  Lab Results: pt had labs done by ob/gyne-   Numbness: left arm; radiating into fingertips and right thigh (nerve damage since fall several years ago)  The nu no hepatosplenomegaly, no abnormal aortic pulsation. MS:  No paraspinal or spinal tenderness, negative straight leg raise bilaterally, no joint swelling or tenderness, normal strength, range of motion and sensation in all four extremities.   Neurologic:  a

## 2018-09-10 ENCOUNTER — OFFICE VISIT (OUTPATIENT)
Dept: FAMILY MEDICINE CLINIC | Facility: CLINIC | Age: 33
End: 2018-09-10
Payer: MEDICAID

## 2018-09-10 VITALS
RESPIRATION RATE: 16 BRPM | TEMPERATURE: 99 F | HEIGHT: 61 IN | WEIGHT: 221 LBS | HEART RATE: 92 BPM | OXYGEN SATURATION: 99 % | DIASTOLIC BLOOD PRESSURE: 74 MMHG | SYSTOLIC BLOOD PRESSURE: 110 MMHG | BODY MASS INDEX: 41.72 KG/M2

## 2018-09-10 DIAGNOSIS — J01.40 ACUTE NON-RECURRENT PANSINUSITIS: Primary | ICD-10-CM

## 2018-09-10 DIAGNOSIS — R11.0 NAUSEA: ICD-10-CM

## 2018-09-10 PROCEDURE — 99213 OFFICE O/P EST LOW 20 MIN: CPT | Performed by: NURSE PRACTITIONER

## 2018-09-10 RX ORDER — ONDANSETRON 4 MG/1
4 TABLET, FILM COATED ORAL EVERY 8 HOURS PRN
Qty: 10 TABLET | Refills: 0 | Status: SHIPPED | OUTPATIENT
Start: 2018-09-10 | End: 2019-02-07

## 2018-09-10 RX ORDER — AMOXICILLIN AND CLAVULANATE POTASSIUM 875; 125 MG/1; MG/1
1 TABLET, FILM COATED ORAL 2 TIMES DAILY
Qty: 20 TABLET | Refills: 0 | Status: SHIPPED | OUTPATIENT
Start: 2018-09-10 | End: 2018-09-11

## 2018-09-10 NOTE — PROGRESS NOTES
CHIEF COMPLAINT:   Patient presents with:  Nasal Congestion: Nausea, Sinus pressure,cough. 1 week. HPI:   Brandon Garces is a 26year old female who presents for cold symptoms for  1  weeks.  Symptoms have progressed into sinus congestion and be Smoker        Packs/day: 1.00        Types: Cigarettes      Smokeless tobacco: Never Used    Alcohol use: No      Alcohol/week: 0.0 oz    Drug use: No        REVIEW OF SYSTEMS:   GENERAL:  decreased appetite  SKIN: no rashes or abnormal skin lesions  HEENT Visit:  Requested Prescriptions     Signed Prescriptions Disp Refills   • Ondansetron HCl (ZOFRAN) 4 mg tablet 10 tablet 0     Sig: Take 1 tablet (4 mg total) by mouth every 8 (eight) hours as needed for Nausea.    • Amoxicillin-Pot Clavulanate 875-125 MG O

## 2018-09-11 ENCOUNTER — TELEPHONE (OUTPATIENT)
Dept: FAMILY MEDICINE CLINIC | Facility: CLINIC | Age: 33
End: 2018-09-11

## 2018-09-11 ENCOUNTER — OFFICE VISIT (OUTPATIENT)
Dept: FAMILY MEDICINE CLINIC | Facility: CLINIC | Age: 33
End: 2018-09-11
Payer: MEDICAID

## 2018-09-11 VITALS
WEIGHT: 221 LBS | OXYGEN SATURATION: 98 % | BODY MASS INDEX: 41.72 KG/M2 | SYSTOLIC BLOOD PRESSURE: 112 MMHG | HEART RATE: 84 BPM | DIASTOLIC BLOOD PRESSURE: 78 MMHG | HEIGHT: 61 IN | TEMPERATURE: 98 F | RESPIRATION RATE: 14 BRPM

## 2018-09-11 DIAGNOSIS — J01.00 ACUTE NON-RECURRENT MAXILLARY SINUSITIS: Primary | ICD-10-CM

## 2018-09-11 PROCEDURE — 99213 OFFICE O/P EST LOW 20 MIN: CPT | Performed by: FAMILY MEDICINE

## 2018-09-11 RX ORDER — ACETAMINOPHEN AND CODEINE PHOSPHATE 300; 30 MG/1; MG/1
1 TABLET ORAL EVERY 4 HOURS PRN
Qty: 20 TABLET | Refills: 0 | Status: SHIPPED
Start: 2018-09-11 | End: 2019-01-08

## 2018-09-11 RX ORDER — BUTALBITAL, ACETAMINOPHEN AND CAFFEINE 50; 325; 40 MG/1; MG/1; MG/1
1 TABLET ORAL 3 TIMES DAILY PRN
Qty: 40 TABLET | Refills: 0 | Status: SHIPPED | OUTPATIENT
Start: 2018-09-11 | End: 2018-10-30

## 2018-09-11 RX ORDER — AZITHROMYCIN 250 MG/1
TABLET, FILM COATED ORAL
Qty: 6 TABLET | Refills: 0 | Status: SHIPPED | OUTPATIENT
Start: 2018-09-11 | End: 2018-10-09

## 2018-09-11 NOTE — TELEPHONE ENCOUNTER
Patient states she had an appt earlier in the day today, forgot to mention that she was pulling weeds and has some lower back pain.  She would like a new RX called into the pharmacy for Laneta Pickle or tylenol with codeine, just 5-10 pills since the ibuprofen is n

## 2018-09-11 NOTE — PROGRESS NOTES
HPI:    Patient ID: Billy Solis is a 26year old female. HPI  Patient presents with:  Sinus Problem: pt was seen at [facility] yesterday- diagnosed with sinus infection  Vomiting: yesterday 3 x in 1 hr  Patient denies any fever or chills.   She does h adequate, no accessory respiratory muscle use, no chest asymmetry, normal excursion.            ASSESSMENT/PLAN:   Acute non-recurrent maxillary sinusitis  (primary encounter diagnosis)  Z-Shorty, Tylenol fluids rest.  Refill of butalbital for headache as need

## 2018-09-11 NOTE — TELEPHONE ENCOUNTER
Patient informed of MD recommendations, patient verbalized understanding with intent to comply.     Future Appointments   Date Time Provider Harriet Bell   9/26/2018  1:00 PM Emmy Leong MD [facility] AT THE [facility] faxed, confirmation received

## 2018-09-11 NOTE — TELEPHONE ENCOUNTER
Patient has pain - constant from bilateral knees to lower back. Having trouble ambulating without pain   Was taking 800 mg Ibuprofen tid and is not helping enough. She does not want to take more as she knows it can be harmful.   She said she did bring this

## 2018-09-17 ENCOUNTER — TELEPHONE (OUTPATIENT)
Dept: FAMILY MEDICINE CLINIC | Facility: CLINIC | Age: 33
End: 2018-09-17

## 2018-09-17 NOTE — TELEPHONE ENCOUNTER
Called patient back to discuss. Patient states she was being tx for a sprained right foot and now she is having a problem with her left.  She states she was bitten by something in the left foot yesterday and is now having pain, swelling and is having diffic

## 2018-10-01 ENCOUNTER — MED REC SCAN ONLY (OUTPATIENT)
Dept: FAMILY MEDICINE CLINIC | Facility: CLINIC | Age: 33
End: 2018-10-01

## 2018-10-01 ENCOUNTER — TELEPHONE (OUTPATIENT)
Dept: FAMILY MEDICINE CLINIC | Facility: CLINIC | Age: 33
End: 2018-10-01

## 2018-10-01 ENCOUNTER — OFFICE VISIT (OUTPATIENT)
Dept: FAMILY MEDICINE CLINIC | Facility: CLINIC | Age: 33
End: 2018-10-01
Payer: MEDICAID

## 2018-10-01 ENCOUNTER — LAB ENCOUNTER (OUTPATIENT)
Dept: LAB | Age: 33
End: 2018-10-01
Attending: FAMILY MEDICINE
Payer: MEDICAID

## 2018-10-01 VITALS
HEART RATE: 104 BPM | SYSTOLIC BLOOD PRESSURE: 120 MMHG | RESPIRATION RATE: 12 BRPM | WEIGHT: 220 LBS | OXYGEN SATURATION: 99 % | DIASTOLIC BLOOD PRESSURE: 60 MMHG | BODY MASS INDEX: 42 KG/M2 | TEMPERATURE: 98 F

## 2018-10-01 DIAGNOSIS — R82.5 POSITIVE URINE DRUG SCREEN: ICD-10-CM

## 2018-10-01 DIAGNOSIS — R42 DIZZINESS: Primary | ICD-10-CM

## 2018-10-01 DIAGNOSIS — J30.9 ALLERGIC RHINITIS, UNSPECIFIED SEASONALITY, UNSPECIFIED TRIGGER: ICD-10-CM

## 2018-10-01 DIAGNOSIS — N39.0 URINARY TRACT INFECTION WITHOUT HEMATURIA, SITE UNSPECIFIED: ICD-10-CM

## 2018-10-01 PROCEDURE — 87086 URINE CULTURE/COLONY COUNT: CPT | Performed by: FAMILY MEDICINE

## 2018-10-01 PROCEDURE — 80345 DRUG SCREENING BARBITURATES: CPT

## 2018-10-01 PROCEDURE — 81003 URINALYSIS AUTO W/O SCOPE: CPT | Performed by: FAMILY MEDICINE

## 2018-10-01 PROCEDURE — 80349 CANNABINOIDS NATURAL: CPT

## 2018-10-01 PROCEDURE — 80307 DRUG TEST PRSMV CHEM ANLYZR: CPT

## 2018-10-01 PROCEDURE — 99214 OFFICE O/P EST MOD 30 MIN: CPT | Performed by: FAMILY MEDICINE

## 2018-10-01 PROCEDURE — 1111F DSCHRG MED/CURRENT MED MERGE: CPT | Performed by: FAMILY MEDICINE

## 2018-10-01 NOTE — TELEPHONE ENCOUNTER
I faxed medical record request to [address]. Vick's to be sent to us ASAP at [phone] and confirmed. Patient is coming today to f/u on her visit. She tested positive for [facility] which she did not knowingly take. She is scheduled with MD this afternoon.   Await

## 2018-10-01 NOTE — PROGRESS NOTES
HPI:    Patient ID: Kaley Navarro is a 26year old female. HPI  Patient has chronic sinus allergy symptoms and an episode of dizziness and felt like she was going to pass out and felt her legs getting weak and unable to move them properly.   This HIVES  Sulfa Antibiotics       RASH   PHYSICAL EXAM:   Physical Exam  General:  Awake, alert, in no acute distress  HENT:  normocephalic, atraumatic, external ear canals clear bilaterally, tympanic membranes appear opaque, mild to moderately bulging with f Culture, Routine [E]      Meds This Visit:  Requested Prescriptions      No prescriptions requested or ordered in this encounter       Imaging & Referrals:  None       QG#[id number]

## 2018-10-01 NOTE — TELEPHONE ENCOUNTER
Patient states she dialed 911 yesterday and was sent to [address] [place] [address] in [place]. She had purchased a new pack of cigarettes and after smoking one she felt funny like she was going to blackout while driving.  She made it back home with her son in the car and

## 2018-10-09 ENCOUNTER — OFFICE VISIT (OUTPATIENT)
Dept: FAMILY MEDICINE CLINIC | Facility: CLINIC | Age: 33
End: 2018-10-09
Payer: MEDICAID

## 2018-10-09 ENCOUNTER — TELEPHONE (OUTPATIENT)
Dept: FAMILY MEDICINE CLINIC | Facility: CLINIC | Age: 33
End: 2018-10-09

## 2018-10-09 VITALS — OXYGEN SATURATION: 99 % | HEART RATE: 98 BPM | RESPIRATION RATE: 14 BRPM | TEMPERATURE: 98 F

## 2018-10-09 DIAGNOSIS — J01.00 ACUTE NON-RECURRENT MAXILLARY SINUSITIS: ICD-10-CM

## 2018-10-09 DIAGNOSIS — F17.200 TOBACCO DEPENDENCY: ICD-10-CM

## 2018-10-09 DIAGNOSIS — F32.A DEPRESSION, UNSPECIFIED DEPRESSION TYPE: Primary | ICD-10-CM

## 2018-10-09 PROCEDURE — 99214 OFFICE O/P EST MOD 30 MIN: CPT | Performed by: FAMILY MEDICINE

## 2018-10-09 RX ORDER — AZITHROMYCIN 250 MG/1
TABLET, FILM COATED ORAL
Qty: 6 TABLET | Refills: 0 | Status: SHIPPED | OUTPATIENT
Start: 2018-10-09 | End: 2018-11-12

## 2018-10-09 RX ORDER — METHYLPREDNISOLONE 4 MG/1
TABLET ORAL
Qty: 1 KIT | Refills: 0 | Status: SHIPPED | OUTPATIENT
Start: 2018-10-09 | End: 2018-11-12

## 2018-10-09 RX ORDER — FLUOXETINE HYDROCHLORIDE 40 MG/1
40 CAPSULE ORAL DAILY
Qty: 90 CAPSULE | Refills: 0 | Status: CANCELLED | OUTPATIENT
Start: 2018-10-09

## 2018-10-09 NOTE — TELEPHONE ENCOUNTER
Confirmed with patient medications as discussed at [address], she reports it was a Chantix starter pack and refill of her fluoxetine 40mg daily that needs to be sent to the [facility]. Pended if okay.

## 2018-10-09 NOTE — TELEPHONE ENCOUNTER
Pharmacy is calling bc pt states that she is missing 2 scripts . .. chantix & fluoxetine. Please follow up with pharmacy. Thanks!

## 2018-10-10 ENCOUNTER — TELEPHONE (OUTPATIENT)
Dept: FAMILY MEDICINE CLINIC | Facility: CLINIC | Age: 33
End: 2018-10-10

## 2018-10-10 RX ORDER — FLUOXETINE HYDROCHLORIDE 40 MG/1
40 CAPSULE ORAL DAILY
Qty: 30 CAPSULE | Refills: 5 | Status: SHIPPED | OUTPATIENT
Start: 2018-10-10 | End: 2018-11-09

## 2018-10-10 NOTE — TELEPHONE ENCOUNTER
Patient called back and demanded to know why a nurse had not called her back in regards to her refill request. Informed patient that we were still waiting for Dr. Essence Sy to address the prescription, as he is not in the office today.  Patient threatened to c

## 2018-10-10 NOTE — TELEPHONE ENCOUNTER
Spoke to Enrico Alfaro in regards to this event. Enrico Alfaro stated that Dr. Lalitha Soares had already called in the prescription for the patient. She asked that I call the patient back to let her know. I called the patient's home number and was hung up on again.  I tried again a

## 2018-10-10 NOTE — TELEPHONE ENCOUNTER
I notified Dr Essence Sy who is sending in her prescription of Fluoxetine. We need to address patients behavior toward staff.

## 2018-10-10 NOTE — TELEPHONE ENCOUNTER
Patient calling because Fluoxetine has not been sent to the pharmacy. Patient is insistent about talking to a nurse in regards to this. She states that she called yesterday and is wondering why the prescription has not been sent to the pharmacy yet.  Inform

## 2018-10-10 NOTE — TELEPHONE ENCOUNTER
Attempted to speak to patient and patient only wants to speak to Nurse Sunitha, advised that I am also able to assist her and patient refused, states that she will call back as the office does not know how to follow instructions and she asked for a specific nu

## 2018-10-10 NOTE — TELEPHONE ENCOUNTER
Patient has called and screamed at the PSR's and called them \"Airheads\" and hung up on them. When [facility] RN called her back, she again got verbally aggressive and said \" our office does not know how to follow directions\" and hung up the phone.    I was t

## 2018-10-11 PROBLEM — F17.200 TOBACCO DEPENDENCY: Status: ACTIVE | Noted: 2018-10-11

## 2018-10-11 PROBLEM — J01.00 ACUTE NON-RECURRENT MAXILLARY SINUSITIS: Status: ACTIVE | Noted: 2018-10-11

## 2018-10-11 NOTE — PROGRESS NOTES
HPI:    Patient ID: Hay Madison is a 26year old female. HPI  The patient is here for follow-up of sinusitis. She is doing much better. Dizziness is minimal.  Venous drainage is mild. She also states she would like to quit smoking.   She can exudate, gingiva and lips without any apparent lesions.    Cardiac:  S1 S2 regular rate and rhythm, no murmur, gallop, or rub  Respiratory:  Bilaterally clear to auscultation, no chest tenderness to palpation, no wheezing, no rhonchi, no rales, air entry is

## 2018-10-23 ENCOUNTER — TELEPHONE (OUTPATIENT)
Dept: FAMILY MEDICINE CLINIC | Facility: CLINIC | Age: 33
End: 2018-10-23

## 2018-10-23 NOTE — TELEPHONE ENCOUNTER
Patient informed. She will hold chantix and if pain does not get better she will make appointment to be seen.

## 2018-10-23 NOTE — TELEPHONE ENCOUNTER
Past 3-4 days patient is complaining of bad headaches. She thinks it is related to Chantix 1mg bid. She started with the starter pack and then went to BID dosing on day 4. She is currently on day 14 of pill.   She is using Butalbital for her migraines and

## 2018-10-23 NOTE — TELEPHONE ENCOUNTER
Patient states she is having headaches and is on day 14 of Chantix. She would like to know if that is a side effect of the medication, please advise.

## 2018-10-25 ENCOUNTER — TELEPHONE (OUTPATIENT)
Dept: FAMILY MEDICINE CLINIC | Facility: CLINIC | Age: 33
End: 2018-10-25

## 2018-10-25 NOTE — TELEPHONE ENCOUNTER
Pt states she would like to talk to nurse about what was triggering her headaches, and if she can go back on chantix

## 2018-10-25 NOTE — TELEPHONE ENCOUNTER
Called patient back after discussion on Tuesday 10/23/18. She has d/c chantix to see if HAs resolved. Patient states she was having HAs still for 2 days after stopping the medication.  Patient states yesterday she paid very close attention to her activities

## 2018-10-30 RX ORDER — BUTALBITAL, ACETAMINOPHEN AND CAFFEINE 50; 325; 40 MG/1; MG/1; MG/1
1 TABLET ORAL 3 TIMES DAILY PRN
Qty: 40 TABLET | Refills: 0 | Status: SHIPPED | OUTPATIENT
Start: 2018-10-30 | End: 2019-02-07

## 2018-10-30 NOTE — TELEPHONE ENCOUNTER
Patient will need a new prescription written for the 3-in-1 headache medication. Patient aware that she will need to pick this up in the office.

## 2018-10-30 NOTE — TELEPHONE ENCOUNTER
Requesting Butalbital-APAP-Caffeine -40 MG  LOV: 10/9/18  RTC: none specified   Last Labs: n/a  Filled: 9/11/18 #40 with 0 refills    No future appointments.     [place]   Dispensed Written Strength Quantity Refills Days Supply Provider Pharmacy   CODE

## 2018-11-08 ENCOUNTER — OFFICE VISIT (OUTPATIENT)
Dept: FAMILY MEDICINE CLINIC | Facility: CLINIC | Age: 33
End: 2018-11-08
Payer: MEDICAID

## 2018-11-08 VITALS
TEMPERATURE: 98 F | RESPIRATION RATE: 16 BRPM | HEART RATE: 90 BPM | OXYGEN SATURATION: 98 % | DIASTOLIC BLOOD PRESSURE: 70 MMHG | SYSTOLIC BLOOD PRESSURE: 116 MMHG | BODY MASS INDEX: 41 KG/M2 | WEIGHT: 218 LBS

## 2018-11-08 DIAGNOSIS — J06.9 UPPER RESPIRATORY TRACT INFECTION, UNSPECIFIED TYPE: Primary | ICD-10-CM

## 2018-11-08 PROCEDURE — 99213 OFFICE O/P EST LOW 20 MIN: CPT | Performed by: PHYSICIAN ASSISTANT

## 2018-11-08 RX ORDER — FLUTICASONE PROPIONATE 50 MCG
2 SPRAY, SUSPENSION (ML) NASAL DAILY
Qty: 1 BOTTLE | Refills: 0 | Status: SHIPPED | OUTPATIENT
Start: 2018-11-08 | End: 2018-11-22

## 2018-11-09 NOTE — PATIENT INSTRUCTIONS
1. Flonase  2. OTC Allegra D  3. Increase fluids/ rest  4. Follow up with PCP    Viral Upper Respiratory Illness (Adult)  You have a viral upper respiratory illness (URI), which is another term for the common cold.  This illness is contagious during the f When to seek medical advice  Call your healthcare provider right away if any of these occur:  · Cough with lots of colored sputum (mucus)  · Severe headache; face, neck, or ear pain  · Difficulty swallowing due to throat pain  · Fever of 100.4°F (38°C) or

## 2018-11-09 NOTE — PROGRESS NOTES
CHIEF COMPLAINT:   Patient presents with:  Cough: congestion, wants to be rechecked from bronchitis visit      HPI:   Marybeth Moody is a 26year old female who presents for upper respiratory symptoms for 2 days.  Patient reports nasal congestion, na MedroxyPROGESTERone Acetate (DEPO-PROVERA) 150 MG/ML Intramuscular Suspension INJECT 1 MILLILITER BY INTRAMUSCULAR ROUTE EVERY 3 MONTHS Disp:  Rfl:       Past Medical History:   Diagnosis Date   • Anxiety    • MIGRAINES     paralyzed Right side of face   • EXTREMITIES: no cyanosis, clubbing or edema  LYMPH:  No lymphadenopathy.         ASSESSMENT AND PLAN:   Tiara Keen is a 26year old female who presents with upper respiratory symptoms that are consistent with    ASSESSMENT:   Upper respiratory tr · You may use acetaminophen or ibuprofen to control pain and fever, unless another medicine was prescribed. If you have chronic liver or kidney disease, have ever had a stomach ulcer or gastrointestinal bleeding, or are taking blood-thinning medicines, carey

## 2018-11-12 ENCOUNTER — OFFICE VISIT (OUTPATIENT)
Dept: FAMILY MEDICINE CLINIC | Facility: CLINIC | Age: 33
End: 2018-11-12
Payer: MEDICAID

## 2018-11-12 VITALS
DIASTOLIC BLOOD PRESSURE: 60 MMHG | RESPIRATION RATE: 14 BRPM | HEART RATE: 102 BPM | WEIGHT: 219 LBS | SYSTOLIC BLOOD PRESSURE: 120 MMHG | BODY MASS INDEX: 41 KG/M2 | OXYGEN SATURATION: 100 % | TEMPERATURE: 98 F

## 2018-11-12 DIAGNOSIS — J01.00 ACUTE NON-RECURRENT MAXILLARY SINUSITIS: Primary | ICD-10-CM

## 2018-11-12 DIAGNOSIS — R05.8 PRODUCTIVE COUGH: ICD-10-CM

## 2018-11-12 PROCEDURE — 99213 OFFICE O/P EST LOW 20 MIN: CPT | Performed by: FAMILY MEDICINE

## 2018-11-12 RX ORDER — AMOXICILLIN AND CLAVULANATE POTASSIUM 500; 125 MG/1; MG/1
1 TABLET, FILM COATED ORAL 2 TIMES DAILY
Qty: 20 TABLET | Refills: 0 | Status: SHIPPED | OUTPATIENT
Start: 2018-11-12 | End: 2018-12-10

## 2018-11-13 NOTE — PROGRESS NOTES
HPI:    Patient ID: Damaris Ahumada is a 26year old female. HPI  Patient is here with complaint of sinus pain pressure congestion cough and congestion. She is here for follow-up of urgent care visit as well.   She had symptomatic treatment at layla rate and rhythm, no murmur, gallop, or rub  Respiratory:  Bilaterally clear to auscultation, no chest tenderness to palpation, no wheezing, no rhonchi, no rales, air entry is adequate, no accessory respiratory muscle use, no chest asymmetry, normal excursi

## 2018-12-10 ENCOUNTER — OFFICE VISIT (OUTPATIENT)
Dept: FAMILY MEDICINE CLINIC | Facility: CLINIC | Age: 33
End: 2018-12-10
Payer: MEDICAID

## 2018-12-10 VITALS
OXYGEN SATURATION: 100 % | SYSTOLIC BLOOD PRESSURE: 120 MMHG | WEIGHT: 221 LBS | DIASTOLIC BLOOD PRESSURE: 60 MMHG | BODY MASS INDEX: 41.72 KG/M2 | HEART RATE: 105 BPM | RESPIRATION RATE: 12 BRPM | HEIGHT: 61 IN | TEMPERATURE: 98 F

## 2018-12-10 DIAGNOSIS — J01.00 ACUTE NON-RECURRENT MAXILLARY SINUSITIS: Primary | ICD-10-CM

## 2018-12-10 PROCEDURE — 99213 OFFICE O/P EST LOW 20 MIN: CPT | Performed by: FAMILY MEDICINE

## 2018-12-10 RX ORDER — AMOXICILLIN AND CLAVULANATE POTASSIUM 500; 125 MG/1; MG/1
1 TABLET, FILM COATED ORAL 2 TIMES DAILY
Qty: 20 TABLET | Refills: 0 | Status: SHIPPED | OUTPATIENT
Start: 2018-12-10 | End: 2019-01-08

## 2018-12-10 NOTE — PROGRESS NOTES
CHIEF COMPLAINT:   Patient has complaint of sinus pain, pressure, congestion. HPI:   Patient has complaint of sinus pain, pressure, congestion, and nasal discharge for the past 4 week(s) and severity is mild.   Patient does not have a fever with a temper bilaterally, tympanic membranes appear opaque, non-bulging, non-erythematous, nasal turbinates are non-inflamed and not swollen, oropharynx without erythema, swelling, or exudate, gingiva and lips without any apparent lesions.  Positive maxillary and fronta

## 2018-12-19 ENCOUNTER — TELEPHONE (OUTPATIENT)
Dept: FAMILY MEDICINE CLINIC | Facility: CLINIC | Age: 33
End: 2018-12-19

## 2018-12-19 RX ORDER — FLUCONAZOLE 150 MG/1
150 TABLET ORAL ONCE
Qty: 1 TABLET | Refills: 0 | Status: SHIPPED | OUTPATIENT
Start: 2018-12-19 | End: 2018-12-19

## 2018-12-19 NOTE — TELEPHONE ENCOUNTER
1. Symptoms: pt is experiencing itching, odor, believes its a yeast area, it burns hurts and hard to wipe  Pt requesting medication to treat yeast infections    Pharmacy: [facility] in [place] [address]: 12/10/18  ASSESSMENT AND PLAN:  (J01.00) Acute non-r

## 2018-12-19 NOTE — TELEPHONE ENCOUNTER
Pt has been on an antibiotic for her feminine parts an has questions for a nurse regarding symptoms she is experiencing. Please call pt. Thanks!

## 2018-12-20 ENCOUNTER — TELEPHONE (OUTPATIENT)
Dept: FAMILY MEDICINE CLINIC | Facility: CLINIC | Age: 33
End: 2018-12-20

## 2018-12-20 DIAGNOSIS — R05.9 COUGH: ICD-10-CM

## 2018-12-20 DIAGNOSIS — R09.89 CHEST CONGESTION: Primary | ICD-10-CM

## 2018-12-20 NOTE — TELEPHONE ENCOUNTER
Patient would like to r/o pneumonia as her symptoms have been ongoing for over a month. Patient states she still has neck and jaw pain (jabbing pain). Chest pain, constant pain, a lot of flem build up, nasal congestion, green nasal discharge.  Pt would like

## 2018-12-20 NOTE — TELEPHONE ENCOUNTER
Patient states she has had a sinus infection for one month and is not better. She has been taking Amoxicillin for 10 days. She is wondering if she has pneumonia. Please advise.

## 2018-12-21 ENCOUNTER — TELEPHONE (OUTPATIENT)
Dept: FAMILY MEDICINE CLINIC | Facility: CLINIC | Age: 33
End: 2018-12-21

## 2018-12-28 ENCOUNTER — TELEPHONE (OUTPATIENT)
Dept: FAMILY MEDICINE CLINIC | Facility: CLINIC | Age: 33
End: 2018-12-28

## 2018-12-28 NOTE — TELEPHONE ENCOUNTER
Symptoms: pt states that she has wrist pain and pain is shooting up arm. Used a brace overnight and states today she cannot bend it down. PT was springing in a spring for a mattress to place it back in a box when this happened (yesterday).   Advised pt to hiram

## 2018-12-28 NOTE — TELEPHONE ENCOUNTER
Pt injury her wrist and it very swollen and she has a brace on it and the pain is shooting up to her arm

## 2018-12-29 ENCOUNTER — APPOINTMENT (OUTPATIENT)
Dept: GENERAL RADIOLOGY | Age: 33
End: 2018-12-29
Attending: EMERGENCY MEDICINE
Payer: MEDICAID

## 2018-12-29 ENCOUNTER — HOSPITAL ENCOUNTER (EMERGENCY)
Age: 33
Discharge: HOME | End: 2018-12-29
Attending: EMERGENCY MEDICINE
Payer: MEDICAID

## 2018-12-29 VITALS
SYSTOLIC BLOOD PRESSURE: 121 MMHG | OXYGEN SATURATION: 100 % | WEIGHT: 220 LBS | RESPIRATION RATE: 20 BRPM | TEMPERATURE: 99 F | BODY MASS INDEX: 40.48 KG/M2 | HEART RATE: 99 BPM | DIASTOLIC BLOOD PRESSURE: 58 MMHG | HEIGHT: 62 IN

## 2018-12-29 DIAGNOSIS — M77.8 TENDONITIS OF WRIST, RIGHT: Primary | ICD-10-CM

## 2018-12-29 PROCEDURE — 99283 EMERGENCY DEPT VISIT LOW MDM: CPT

## 2018-12-29 PROCEDURE — 73110 X-RAY EXAM OF WRIST: CPT | Performed by: EMERGENCY MEDICINE

## 2018-12-29 PROCEDURE — 99284 EMERGENCY DEPT VISIT MOD MDM: CPT

## 2018-12-29 RX ORDER — CYCLOBENZAPRINE HCL 10 MG
10 TABLET ORAL 3 TIMES DAILY PRN
Qty: 20 TABLET | Refills: 0 | Status: SHIPPED | OUTPATIENT
Start: 2018-12-29 | End: 2019-01-08

## 2018-12-29 RX ORDER — NAPROXEN 500 MG/1
500 TABLET ORAL 2 TIMES DAILY PRN
Qty: 20 TABLET | Refills: 0 | Status: SHIPPED | OUTPATIENT
Start: 2018-12-29 | End: 2019-01-08

## 2018-12-29 RX ORDER — METHYLPREDNISOLONE 4 MG/1
TABLET ORAL
Qty: 1 PACKAGE | Refills: 0 | Status: SHIPPED | OUTPATIENT
Start: 2018-12-29 | End: 2019-01-03

## 2018-12-30 NOTE — ED INITIAL ASSESSMENT (HPI)
C/o injury to Rt wrist a couple days ago while pulling on mattress, held puppy 12/28, felt pop.   Pt has velcro wrist brace on extremity

## 2018-12-30 NOTE — ED PROVIDER NOTES
Patient Seen in: THE [facility] Emergency Department In [place]    History   Patient presents with:  Upper Extremity Injury (musculoskeletal)    Stated Complaint: right wrist injury after lifting something heavy    HPI    25-year-old female presents to the [facility] Resp 20   Temp 98.5 °F (36.9 °C)   Temp src Temporal   SpO2 100 %   O2 Device None (Room air)       Current:/58   Pulse 99   Temp 98.5 °F (36.9 °C) (Temporal)   Resp 20   Ht 157.5 cm (5' 2\")   Wt 99.8 kg   SpO2 100%   BMI 40.24 kg/m²         Physi condition          Disposition and Plan     Clinical Impression:  Tendonitis of wrist, right  (primary encounter diagnosis)    Disposition:  Discharge  12/29/2018  9:25 pm    Follow-up:  Sabra Holstein, MD  [address] [place] [address] 100, [address] [place] [address]

## 2019-01-08 ENCOUNTER — OFFICE VISIT (OUTPATIENT)
Dept: FAMILY MEDICINE CLINIC | Facility: CLINIC | Age: 34
End: 2019-01-08
Payer: MEDICAID

## 2019-01-08 VITALS
HEIGHT: 61 IN | WEIGHT: 224 LBS | DIASTOLIC BLOOD PRESSURE: 70 MMHG | RESPIRATION RATE: 16 BRPM | BODY MASS INDEX: 42.29 KG/M2 | SYSTOLIC BLOOD PRESSURE: 118 MMHG | TEMPERATURE: 98 F | HEART RATE: 86 BPM

## 2019-01-08 DIAGNOSIS — J01.01 ACUTE RECURRENT MAXILLARY SINUSITIS: Primary | ICD-10-CM

## 2019-01-08 DIAGNOSIS — J30.9 ALLERGIC RHINITIS, UNSPECIFIED SEASONALITY, UNSPECIFIED TRIGGER: ICD-10-CM

## 2019-01-08 DIAGNOSIS — H65.93 BILATERAL SEROUS OTITIS MEDIA, UNSPECIFIED CHRONICITY: ICD-10-CM

## 2019-01-08 PROCEDURE — 99213 OFFICE O/P EST LOW 20 MIN: CPT | Performed by: PHYSICIAN ASSISTANT

## 2019-01-08 RX ORDER — MONTELUKAST SODIUM 10 MG/1
10 TABLET ORAL NIGHTLY
Qty: 90 TABLET | Refills: 0 | Status: SHIPPED | OUTPATIENT
Start: 2019-01-08 | End: 2020-03-04

## 2019-01-08 RX ORDER — CLINDAMYCIN HYDROCHLORIDE 300 MG/1
300 CAPSULE ORAL 2 TIMES DAILY
Qty: 20 CAPSULE | Refills: 0 | Status: SHIPPED | OUTPATIENT
Start: 2019-01-08 | End: 2019-02-07

## 2019-01-08 RX ORDER — IPRATROPIUM BROMIDE 21 UG/1
2 SPRAY, METERED NASAL EVERY 12 HOURS
Qty: 1 BOTTLE | Refills: 0 | Status: SHIPPED | OUTPATIENT
Start: 2019-01-08 | End: 2019-02-07

## 2019-01-08 NOTE — PATIENT INSTRUCTIONS
Thank you for choosing Cristina Solares PA-C at [place] [address]  To Do: Vivianne Kawasaki Albertelli  1. Begin medications as prescribed  2. Start steroid pack  3.  If symptoms persist or increase call office    • Please signup for MY CHART, which is electro company approved your testing, please call Central Scheduling at [phone]  Please allow our office 5 business days to contact you regarding any testing results.     Refill policies:   Allow 3 business days for refills; controlled substances may take jake

## 2019-01-08 NOTE — PROGRESS NOTES
[facility] Internal Medicine Progress Note    CC:  Patient presents with:  Nasal Congestion  Post Nasal Drip  Eye Problem: watery, itchy eyes       HPI:   HPI  Nasal congestion, PND  She has been battling ups and downs of sinusitis and bronchitis Positive for cough. Negative for chest tightness, shortness of breath and wheezing. Cardiovascular: Negative for chest pain. Gastrointestinal: Negative for nausea and vomiting. Neurological: Negative for dizziness, light-headedness and headaches. (300 mg total) by mouth 2 (two) times daily. • Ipratropium Bromide 0.03 % Nasal Solution 1 Bottle 0     Si sprays by Nasal route every 12 (twelve) hours.    • Montelukast Sodium 10 MG Oral Tab 90 tablet 0     Sig: Take 1 tablet (10 mg total) by mouth

## 2019-02-07 ENCOUNTER — OFFICE VISIT (OUTPATIENT)
Dept: FAMILY MEDICINE CLINIC | Facility: CLINIC | Age: 34
End: 2019-02-07
Payer: MEDICAID

## 2019-02-07 VITALS
HEART RATE: 92 BPM | HEIGHT: 61 IN | RESPIRATION RATE: 16 BRPM | WEIGHT: 223 LBS | BODY MASS INDEX: 42.1 KG/M2 | TEMPERATURE: 98 F | SYSTOLIC BLOOD PRESSURE: 112 MMHG | DIASTOLIC BLOOD PRESSURE: 72 MMHG

## 2019-02-07 DIAGNOSIS — H66.002 ACUTE SUPPURATIVE OTITIS MEDIA OF LEFT EAR WITHOUT SPONTANEOUS RUPTURE OF TYMPANIC MEMBRANE, RECURRENCE NOT SPECIFIED: Primary | ICD-10-CM

## 2019-02-07 DIAGNOSIS — J01.01 ACUTE RECURRENT MAXILLARY SINUSITIS: ICD-10-CM

## 2019-02-07 PROCEDURE — 99213 OFFICE O/P EST LOW 20 MIN: CPT | Performed by: PHYSICIAN ASSISTANT

## 2019-02-07 RX ORDER — AMOXICILLIN AND CLAVULANATE POTASSIUM 875; 125 MG/1; MG/1
1 TABLET, FILM COATED ORAL 2 TIMES DAILY
Qty: 20 TABLET | Refills: 0 | Status: SHIPPED | OUTPATIENT
Start: 2019-02-07 | End: 2019-02-17

## 2019-02-07 NOTE — PATIENT INSTRUCTIONS
Thank you for choosing Yanira Lewis PA-C at [place] [address]  To Do: Sammy Mora  1. Begin medications as prescribed  2. OTC Allegra in AM, OTC Zyrtec in PM  3. Humidifier  4. Follow-up with ENT  5.  Follow-up if symptoms persist or incre office has called informing you that the insurance company approved your testing, please call Central Scheduling at [phone]  Please allow our office 5 business days to contact you regarding any testing results.     Refill policies:   Allow 3 business d

## 2019-02-07 NOTE — PROGRESS NOTES
[facility] Internal Medicine Progress Note    CC:  Patient presents with:  Nasal Congestion: x 1 week  Cough      HPI:   HPI  Nasal congestion x 1 week, cough  She states she was doing well  She states the weather has really been bothering her  S well-nourished, and in no distress. Cardiovascular: Normal rate, regular rhythm and normal heart sounds. Exam reveals no gallop and no friction rub. No murmur heard. Vitals reviewed.         Assessment and Plan:  Acute suppurative otitis media of left

## 2019-03-07 ENCOUNTER — OFFICE VISIT (OUTPATIENT)
Dept: FAMILY MEDICINE CLINIC | Facility: CLINIC | Age: 34
End: 2019-03-07
Payer: MEDICAID

## 2019-03-07 VITALS
HEART RATE: 92 BPM | DIASTOLIC BLOOD PRESSURE: 80 MMHG | RESPIRATION RATE: 16 BRPM | SYSTOLIC BLOOD PRESSURE: 114 MMHG | WEIGHT: 221 LBS | OXYGEN SATURATION: 99 % | TEMPERATURE: 98 F | BODY MASS INDEX: 41.72 KG/M2 | HEIGHT: 61 IN

## 2019-03-07 DIAGNOSIS — J01.00 ACUTE NON-RECURRENT MAXILLARY SINUSITIS: Primary | ICD-10-CM

## 2019-03-07 DIAGNOSIS — H66.90 ACUTE OTITIS MEDIA, UNSPECIFIED OTITIS MEDIA TYPE: ICD-10-CM

## 2019-03-07 PROCEDURE — 99213 OFFICE O/P EST LOW 20 MIN: CPT | Performed by: FAMILY MEDICINE

## 2019-03-07 RX ORDER — LEVOFLOXACIN 500 MG/1
500 TABLET, FILM COATED ORAL DAILY
Qty: 7 TABLET | Refills: 0 | Status: SHIPPED | OUTPATIENT
Start: 2019-03-07 | End: 2019-03-14

## 2019-03-07 NOTE — PROGRESS NOTES
HPI:   Celestina Jolley is a 26year old female that presents for sinus problem- ongoing issues with sinus congestion and cough with post nasal drip. She had an ear infection 1 month ago. Bodyaches and chills.  She states the weather has had a big fact Eyes: EOMI, PERRLA, no scleral icterus, conjunctivae clear, no eye discharge    Ears: External normal.  Moderate erythema of left tympanic membrane with moderate bulging   Nose: patent, no nasal discharge positive maxillary sinus tenderness   Throat:  N

## 2019-04-03 ENCOUNTER — OFFICE VISIT (OUTPATIENT)
Dept: FAMILY MEDICINE CLINIC | Facility: CLINIC | Age: 34
End: 2019-04-03
Payer: MEDICAID

## 2019-04-03 VITALS
BODY MASS INDEX: 40.22 KG/M2 | WEIGHT: 213 LBS | SYSTOLIC BLOOD PRESSURE: 124 MMHG | OXYGEN SATURATION: 98 % | DIASTOLIC BLOOD PRESSURE: 72 MMHG | RESPIRATION RATE: 20 BRPM | HEART RATE: 82 BPM | TEMPERATURE: 98 F | HEIGHT: 61 IN

## 2019-04-03 DIAGNOSIS — H66.003 ACUTE SUPPURATIVE OTITIS MEDIA OF BOTH EARS WITHOUT SPONTANEOUS RUPTURE OF TYMPANIC MEMBRANES, RECURRENCE NOT SPECIFIED: Primary | ICD-10-CM

## 2019-04-03 DIAGNOSIS — J01.40 ACUTE PANSINUSITIS, RECURRENCE NOT SPECIFIED: ICD-10-CM

## 2019-04-03 PROCEDURE — 99213 OFFICE O/P EST LOW 20 MIN: CPT | Performed by: PHYSICIAN ASSISTANT

## 2019-04-03 RX ORDER — CEFDINIR 300 MG/1
300 CAPSULE ORAL 2 TIMES DAILY
Qty: 20 CAPSULE | Refills: 0 | Status: SHIPPED | OUTPATIENT
Start: 2019-04-03 | End: 2019-04-13

## 2019-04-03 RX ORDER — FLUTICASONE PROPIONATE 50 MCG
2 SPRAY, SUSPENSION (ML) NASAL DAILY
Qty: 1 BOTTLE | Refills: 0 | Status: SHIPPED | OUTPATIENT
Start: 2019-04-03 | End: 2019-04-17

## 2019-04-03 NOTE — PATIENT INSTRUCTIONS
1. Cefdinir  2. Flonase  3. Follow up with PCP or ENT    Middle Ear Infection (Adult)  You have an infection of the middle ear, the space behind the eardrum. This is also called acute otitis media (AOM). Sometimes it is caused by the common cold.  This is © 4171-3153 [employer] [address]. [address] [facility], [facility] [place]. All rights reserved. This information is not intended as a substitute for professional medical care. Always follow your healthcare professional's instructions.

## 2019-04-03 NOTE — PROGRESS NOTES
CHIEF COMPLAINT:   Patient presents with:  Nasal Congestion: post nasal drip, bilateral ear pain, headache, nausea from post nasal drip, sinus pressure, X 1 week       HPI:   Bernardo Amezcua is a 26year old female who presents for cold symptoms for • Hypertension Father    • Breast Cancer Maternal Grandmother    • Cancer Maternal Grandfather         prostate cancer   • Asthma Mother    • Diabetes Mother       Social History    Tobacco Use      Smoking status: Current Every Day Smoker        Packs/day Ifemoa Hoffman is a 26year old female who presents with Nasal Congestion (post nasal drip, bilateral ear pain, headache, nausea from post nasal drip, sinus pressure, X 1 week ).  Symptoms are consistent with:      ASSESSMENT:  Acute suppurative otit · You may use over-the-counter medicine, such as acetaminophen or ibuprofen, to control pain and fever, unless something else was prescribed.  If you have chronic liver or kidney disease or have ever had a stomach ulcer or gastrointestinal bleeding, talk wi

## 2019-04-26 ENCOUNTER — OFFICE VISIT (OUTPATIENT)
Dept: FAMILY MEDICINE CLINIC | Facility: CLINIC | Age: 34
End: 2019-04-26
Payer: MEDICAID

## 2019-04-26 VITALS
WEIGHT: 210.63 LBS | HEART RATE: 101 BPM | OXYGEN SATURATION: 98 % | SYSTOLIC BLOOD PRESSURE: 118 MMHG | TEMPERATURE: 98 F | DIASTOLIC BLOOD PRESSURE: 64 MMHG | BODY MASS INDEX: 39.77 KG/M2 | HEIGHT: 61 IN | RESPIRATION RATE: 16 BRPM

## 2019-04-26 DIAGNOSIS — J06.9 VIRAL URI WITH COUGH: Primary | ICD-10-CM

## 2019-04-26 DIAGNOSIS — F17.200 TOBACCO USE DISORDER: ICD-10-CM

## 2019-04-26 PROCEDURE — 99213 OFFICE O/P EST LOW 20 MIN: CPT | Performed by: NURSE PRACTITIONER

## 2019-04-26 RX ORDER — BENZONATATE 200 MG/1
200 CAPSULE ORAL 3 TIMES DAILY PRN
Qty: 20 CAPSULE | Refills: 0 | Status: SHIPPED | OUTPATIENT
Start: 2019-04-26 | End: 2019-05-03

## 2019-04-26 RX ORDER — FLUTICASONE PROPIONATE 50 MCG
SPRAY, SUSPENSION (ML) NASAL
Qty: 16 G | Refills: 0 | Status: SHIPPED | OUTPATIENT
Start: 2019-04-26 | End: 2019-05-16

## 2019-04-26 NOTE — PROGRESS NOTES
CHIEF COMPLAINT:   Patient presents with:  Nasal Congestion: lost voice, cough, ear pain x 4 days      HPI:   Sharon Solorzano is a 26year old female who presents for cold symptoms for  4  days.  Pt c/o bilat ear pain, left >right, chest and head manan LUNGS: denies shortness of breath or wheezing, See HPI  CARDIOVASCULAR: denies chest pain or palpitations   GI: denies N/V/C or abdominal pain  NEURO: + headaches. No numbness or tingling in face.     EXAM:   /64 (BP Location: Right arm, Patient Posi • Fluticasone Propionate 50 MCG/ACT Nasal Suspension 16 g 0     Si sprays per nare QD       Risks, benefits, side effects of medication addressed and explained.     Patient Instructions     Viral Upper Respiratory Illness (Adult)  You have a viral upper · Over-the-counter cold medicines will not shorten the length of time you’re sick, but they may be helpful for the following symptoms: cough, sore throat, and nasal and sinus congestion.  (Note: Do not use decongestants if you have high blood pressure.)  Fo

## 2019-05-15 ENCOUNTER — HOSPITAL ENCOUNTER (EMERGENCY)
Age: 34
Discharge: HOME | End: 2019-05-16
Attending: EMERGENCY MEDICINE
Payer: MEDICAID

## 2019-05-15 VITALS
BODY MASS INDEX: 37.79 KG/M2 | HEART RATE: 88 BPM | SYSTOLIC BLOOD PRESSURE: 118 MMHG | OXYGEN SATURATION: 100 % | RESPIRATION RATE: 16 BRPM | WEIGHT: 208 LBS | DIASTOLIC BLOOD PRESSURE: 69 MMHG | HEIGHT: 62.21 IN | TEMPERATURE: 98 F

## 2019-05-15 DIAGNOSIS — M54.50 LOW BACK PAIN WITHOUT SCIATICA, UNSPECIFIED BACK PAIN LATERALITY, UNSPECIFIED CHRONICITY: Primary | ICD-10-CM

## 2019-05-15 PROCEDURE — 87086 URINE CULTURE/COLONY COUNT: CPT | Performed by: EMERGENCY MEDICINE

## 2019-05-15 PROCEDURE — 99283 EMERGENCY DEPT VISIT LOW MDM: CPT

## 2019-05-15 PROCEDURE — 81001 URINALYSIS AUTO W/SCOPE: CPT | Performed by: EMERGENCY MEDICINE

## 2019-05-16 ENCOUNTER — TELEPHONE (OUTPATIENT)
Dept: FAMILY MEDICINE CLINIC | Facility: CLINIC | Age: 34
End: 2019-05-16

## 2019-05-16 ENCOUNTER — OFFICE VISIT (OUTPATIENT)
Dept: FAMILY MEDICINE CLINIC | Facility: CLINIC | Age: 34
End: 2019-05-16
Payer: MEDICAID

## 2019-05-16 VITALS
HEART RATE: 90 BPM | DIASTOLIC BLOOD PRESSURE: 80 MMHG | RESPIRATION RATE: 16 BRPM | HEIGHT: 61 IN | BODY MASS INDEX: 39.08 KG/M2 | TEMPERATURE: 98 F | SYSTOLIC BLOOD PRESSURE: 122 MMHG | WEIGHT: 207 LBS

## 2019-05-16 DIAGNOSIS — R10.9 FLANK PAIN: ICD-10-CM

## 2019-05-16 DIAGNOSIS — K59.00 CONSTIPATION, UNSPECIFIED CONSTIPATION TYPE: ICD-10-CM

## 2019-05-16 DIAGNOSIS — N89.8 VAGINAL DISCHARGE: ICD-10-CM

## 2019-05-16 DIAGNOSIS — R31.9 HEMATURIA, UNSPECIFIED TYPE: Primary | ICD-10-CM

## 2019-05-16 DIAGNOSIS — R10.30 LOWER ABDOMINAL PAIN: ICD-10-CM

## 2019-05-16 PROCEDURE — 99214 OFFICE O/P EST MOD 30 MIN: CPT | Performed by: PHYSICIAN ASSISTANT

## 2019-05-16 PROCEDURE — 87086 URINE CULTURE/COLONY COUNT: CPT | Performed by: PHYSICIAN ASSISTANT

## 2019-05-16 RX ORDER — CLOTRIMAZOLE AND BETAMETHASONE DIPROPIONATE 10; .64 MG/G; MG/G
1 CREAM TOPICAL 2 TIMES DAILY PRN
Qty: 60 G | Refills: 0 | Status: SHIPPED | OUTPATIENT
Start: 2019-05-16 | End: 2019-07-01

## 2019-05-16 RX ORDER — FLUCONAZOLE 150 MG/1
150 TABLET ORAL ONCE
Qty: 1 TABLET | Refills: 0 | Status: SHIPPED | OUTPATIENT
Start: 2019-05-16 | End: 2019-05-16

## 2019-05-16 NOTE — TELEPHONE ENCOUNTER
Pt was seen in ER. She went bc of pain in her mid and lower R back. She also is experiencing itching, burning and discharge. They ruled out kidney stone, uti, yeast infection.     She states they told her that she was feeling the pain from standing too l

## 2019-05-16 NOTE — ED NOTES
Pt states she does not believe the urine results. She stated that I never sent her urine to the lab. States the urine always gets sent in the cup. I explained to her that we split the urine into the tube. She did not believe it.  She took her urine cup with

## 2019-05-16 NOTE — ED PROVIDER NOTES
Patient Seen in: [facility] In [place]    History   Patient presents with:  Abdomen/Flank Pain (GI/)    Stated Complaint: right sided flank pain. +nausea & dizzy.   clear vaginal discharge    HPI    Patient is a 43-year-old woman he Conjunctivae are normal. No scleral icterus. Neck: Normal range of motion. Neck supple. Cardiovascular: Normal rate and intact distal pulses. Pulmonary/Chest: Effort normal. No respiratory distress. Abdominal: Soft. She exhibits no distension.  Ther recommend follow-up with her doctor if she has any concerns or problems. Urine was sent for culture.       Patient was screened and evaluated during this visit.  I performed a medical screening examination.  As a treating physician attending to the patient

## 2019-05-16 NOTE — TELEPHONE ENCOUNTER
Pt has multiple complaints about ER visit 5-15-19. No vomiting. Pt complaining of nausea and pain in her back, middle on right side and radiates around to RLQ abdominal pain, pain is upon movement. No fever. Scheduled follow up with Claire Huddleston today.  Pt need

## 2019-05-16 NOTE — PATIENT INSTRUCTIONS
Thank you for choosing Rose Marie Nesbitt PA-C at [place] [address]  To Do: Justino Mora  1. Begin medications as prescribed  2. Get xrays done  3. Will call with urine culture results  4.  Follow-up if symptoms persist or increase    • Please si informing you that the insurance company approved your testing, please call Central Scheduling at [phone]  Please allow our office 5 business days to contact you regarding any testing results.     Refill policies:   Allow 3 business days for refills; c

## 2019-05-16 NOTE — PROGRESS NOTES
[facility] Internal Medicine Progress Note    CC:  Patient presents with:  ER F/U: [facility] on 5/15/19 for low back pain      HPI:   HPI  ER f/u  Pt went to the ER yesterday for  R side flank pain  She states she woke up yesterday with the pain and headaches. /80   Pulse 90   Temp 98.2 °F (36.8 °C) (Oral)   Resp 16   Ht 61\"   Wt 207 lb   BMI 39.11 kg/m²  Body mass index is 39.11 kg/m².   Physical Exam   Constitutional: She is oriented to person, place, and time and well-developed, w Visit:  Requested Prescriptions     Signed Prescriptions Disp Refills   • fluconazole (DIFLUCAN) 150 MG Oral Tab 1 tablet 0     Sig: Take 1 tablet (150 mg total) by mouth once for 1 dose.    • clotrimazole-betamethasone 1-0.05 % External Cream 60 g 0     Si

## 2019-05-17 ENCOUNTER — HOSPITAL ENCOUNTER (OUTPATIENT)
Dept: GENERAL RADIOLOGY | Age: 34
Discharge: HOME | End: 2019-05-17
Attending: PHYSICIAN ASSISTANT
Payer: MEDICAID

## 2019-05-17 DIAGNOSIS — K59.00 CONSTIPATION, UNSPECIFIED CONSTIPATION TYPE: ICD-10-CM

## 2019-05-17 DIAGNOSIS — R31.9 HEMATURIA, UNSPECIFIED TYPE: ICD-10-CM

## 2019-05-17 DIAGNOSIS — R10.9 FLANK PAIN: ICD-10-CM

## 2019-05-17 PROCEDURE — 74019 RADEX ABDOMEN 2 VIEWS: CPT | Performed by: PHYSICIAN ASSISTANT

## 2019-05-18 ENCOUNTER — MOBILE ENCOUNTER (OUTPATIENT)
Dept: FAMILY MEDICINE CLINIC | Facility: CLINIC | Age: 34
End: 2019-05-18

## 2019-05-18 RX ORDER — CIPROFLOXACIN 250 MG/1
250 TABLET, FILM COATED ORAL 2 TIMES DAILY
Qty: 20 TABLET | Refills: 0 | Status: SHIPPED | OUTPATIENT
Start: 2019-05-18 | End: 2019-05-28

## 2019-05-19 ENCOUNTER — MOBILE ENCOUNTER (OUTPATIENT)
Dept: FAMILY MEDICINE CLINIC | Facility: CLINIC | Age: 34
End: 2019-05-19

## 2019-05-20 NOTE — PROGRESS NOTES
cipro ordered for UTI sx. Reviewed KUB and urine culture with her.  Admonished her to go to ED if symptoms persist (dizziness, fever, abdominal pain)

## 2019-06-27 ENCOUNTER — TELEPHONE (OUTPATIENT)
Dept: FAMILY MEDICINE CLINIC | Facility: CLINIC | Age: 34
End: 2019-06-27

## 2019-06-27 NOTE — TELEPHONE ENCOUNTER
Pt is calling bc she states she has a red jessica (L ankle & R leg where the ankles meet the leg). She is not sure how she got it or what it is. She is not sure what she should do. She would like to speak to a nurse.

## 2019-06-27 NOTE — TELEPHONE ENCOUNTER
Spoke to pt, pt reports red marks x3, \"bit about 3 days ago\" started itching 3 evenings ago. 2 have \"Red ring, open sore about cigarette burn size\". Burning sensation, lump underneath.    No fever, no body aches, no headache, no swelling except mild a

## 2019-06-28 ENCOUNTER — TELEPHONE (OUTPATIENT)
Dept: FAMILY MEDICINE CLINIC | Facility: CLINIC | Age: 34
End: 2019-06-28

## 2019-06-28 NOTE — TELEPHONE ENCOUNTER
It is not getting better. She is thinking about going to an urgent care.  She would like to consult with a nurse first.

## 2019-06-28 NOTE — TELEPHONE ENCOUNTER
Spoke to pt, \"maybe notice slight swelling in right ankle, \"biggest sore of them all\", \"thinking spider bite\". Painful and tender to touch. Advised evaluation, advised [employer] In clinic or IC. Pt expressed understanding the agreement.  Task complet

## 2019-07-01 ENCOUNTER — TELEPHONE (OUTPATIENT)
Dept: FAMILY MEDICINE CLINIC | Facility: CLINIC | Age: 34
End: 2019-07-01

## 2019-07-01 ENCOUNTER — OFFICE VISIT (OUTPATIENT)
Dept: FAMILY MEDICINE CLINIC | Facility: CLINIC | Age: 34
End: 2019-07-01
Payer: MEDICAID

## 2019-07-01 VITALS
DIASTOLIC BLOOD PRESSURE: 72 MMHG | WEIGHT: 198.13 LBS | BODY MASS INDEX: 37.41 KG/M2 | RESPIRATION RATE: 16 BRPM | TEMPERATURE: 99 F | SYSTOLIC BLOOD PRESSURE: 120 MMHG | HEIGHT: 61 IN | HEART RATE: 90 BPM | OXYGEN SATURATION: 99 %

## 2019-07-01 DIAGNOSIS — S80.869A INSECT BITE OF LOWER LEG, UNSPECIFIED LATERALITY, INITIAL ENCOUNTER: Primary | ICD-10-CM
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
## 2019-07-01 PROCEDURE — 99213 OFFICE O/P EST LOW 20 MIN: CPT | Performed by: FAMILY MEDICINE

## 2019-07-01 NOTE — TELEPHONE ENCOUNTER
Called pt, she states the area is oozing, not better. Scheduled OV with Dr. Zachery Babcock today. Task completed.

## 2019-07-01 NOTE — PROGRESS NOTES
HPI:   Brie Rea is a 37year old female that presents for bug bite on her right leg, increasing in size, she states its itching and was oozing the first day. She has multiple big bite.      Past medical, surgical, family and social history revi NEURO:  Grossly normal     ASSESSMENT AND PLAN:      1. Insect bite of lower leg, unspecified laterality, initial encounter    Will have patient use bacitracin ointment to the right leg wound and then use steroid cream to both leg wounds.   Follow-up in 1

## 2019-07-16 ENCOUNTER — TELEPHONE (OUTPATIENT)
Dept: OBGYN CLINIC | Facility: CLINIC | Age: 34
End: 2019-07-16

## 2019-07-16 NOTE — TELEPHONE ENCOUNTER
Pt would like to speak with a nurse to discuss eyes drying out. Could Dr. Santana Segura order something for Dry eye.     Call [phone]

## 2019-07-16 NOTE — TELEPHONE ENCOUNTER
Pt saw Raquel Spatz MD 10/2018 with Eye Exam.  Wears contacts. Saw Eye MD 7-16-19, saw dryness in eyes, could be from allergies . He feels his prescription would be too costly for pt. Eye MD gave pt new contacts to see if they help the symptoms.    Advised pt t

## 2019-07-31 ENCOUNTER — TELEPHONE (OUTPATIENT)
Dept: FAMILY MEDICINE CLINIC | Facility: CLINIC | Age: 34
End: 2019-07-31

## 2019-07-31 ENCOUNTER — OFFICE VISIT (OUTPATIENT)
Dept: FAMILY MEDICINE CLINIC | Facility: CLINIC | Age: 34
End: 2019-07-31
Payer: MEDICAID

## 2019-07-31 VITALS
HEART RATE: 90 BPM | SYSTOLIC BLOOD PRESSURE: 122 MMHG | HEIGHT: 61 IN | OXYGEN SATURATION: 98 % | DIASTOLIC BLOOD PRESSURE: 74 MMHG | TEMPERATURE: 98 F | RESPIRATION RATE: 16 BRPM | WEIGHT: 196 LBS | BODY MASS INDEX: 37 KG/M2

## 2019-07-31 DIAGNOSIS — M25.471 RIGHT ANKLE SWELLING: Primary | ICD-10-CM

## 2019-07-31 DIAGNOSIS — M25.571 ACUTE RIGHT ANKLE PAIN: ICD-10-CM

## 2019-07-31 PROCEDURE — 99213 OFFICE O/P EST LOW 20 MIN: CPT | Performed by: PHYSICIAN ASSISTANT

## 2019-07-31 NOTE — TELEPHONE ENCOUNTER
Pt was in on 7. 1.19 for a bug bite -she has numbness and tingling on right leg and swollen ankle which is getting worse by the day .

## 2019-07-31 NOTE — PATIENT INSTRUCTIONS
Ice   Elevation   Rest- no high impact activities   Ace wrap   Tylenol for pain   Close follow up with PCP  ED for worsening symptoms

## 2019-07-31 NOTE — TELEPHONE ENCOUNTER
The patient called back to say that I told her she didn't have an appt. I told her that I was checking the schedule because she was just put in and I had her on hold to check with the doctor if he could still see her since she was going to be late.  She sta

## 2019-07-31 NOTE — PROGRESS NOTES
CHIEF COMPLAINT:   Patient presents with:  Swelling: to Right ankle, bug bite 7/1/19, has tingling & numbness of right leg         HPI:    Edyta Galvan is a 37year old female who presents for evaluation of bite and swelling.  Patient states she ha Tobacco Use      Smoking status: Current Every Day Smoker        Packs/day: 1.00        Types: Cigarettes      Smokeless tobacco: Never Used    Alcohol use:  Yes      Alcohol/week: 0.0 standard drinks      Comment: very rare     Drug use: No        REVIEW O Conservative management-ice/elevation/compression wrap/rest   Patient will follow up closely with PCP   ED for worsening symptoms   PLAN: Meds as listed below. Comfort measures as described in Patient Instructions.         The patient indicates [place]

## 2019-07-31 NOTE — TELEPHONE ENCOUNTER
Patient would like to be seen today, refuses other appts offered, she states this is an urgent matter, Bilateral leg swelling within 2 hrs of her walking. Reports a ball the size of tennis ball on the back of her right leg. Requesting an appt for today.

## 2019-07-31 NOTE — TELEPHONE ENCOUNTER
Patient called at 3:07 to say she was going to be 15 min late for her 3:30 appt.  I checked with the MA and she asked me to check with the MD. I told the patient I was checking for her and she said, \"my foot is swollen, he was fitting me in, I'm not playin

## 2019-07-31 NOTE — TELEPHONE ENCOUNTER
LM for patient to call back.  Dr Angelica Farah can see her at 3:30pm. Otherwise patient can see Riley Tellez tomorrow

## 2019-08-23 ENCOUNTER — TELEPHONE (OUTPATIENT)
Dept: FAMILY MEDICINE CLINIC | Facility: CLINIC | Age: 34
End: 2019-08-23

## 2019-08-23 NOTE — TELEPHONE ENCOUNTER
Spoke with pt,   States 3-4 nights ago she was holding her cat who likes to go outside  States she woke up the next morning and noticed a small \"pimple like\" bump on her abdomen  Black dot with red rash around it  States she did not see or feel a tick bi

## 2019-08-23 NOTE — TELEPHONE ENCOUNTER
Patient states she may have a tick in her stomach. There is a hard lump and the redness is spreading, please advise.

## 2019-08-24 ENCOUNTER — HOSPITAL ENCOUNTER (EMERGENCY)
Age: 34
Discharge: HOME | End: 2019-08-25
Attending: EMERGENCY MEDICINE
Payer: MEDICAID

## 2019-08-24 VITALS
DIASTOLIC BLOOD PRESSURE: 51 MMHG | BODY MASS INDEX: 37 KG/M2 | OXYGEN SATURATION: 98 % | WEIGHT: 196 LBS | TEMPERATURE: 98 F | SYSTOLIC BLOOD PRESSURE: 100 MMHG | HEIGHT: 61 IN | RESPIRATION RATE: 16 BRPM | HEART RATE: 90 BPM

## 2019-08-24 DIAGNOSIS — L02.91 ABSCESS: Primary | ICD-10-CM

## 2019-08-24 DIAGNOSIS — L03.90 CELLULITIS, UNSPECIFIED CELLULITIS SITE: ICD-10-CM

## 2019-08-24 PROCEDURE — 99283 EMERGENCY DEPT VISIT LOW MDM: CPT

## 2019-08-25 PROCEDURE — 87070 CULTURE OTHR SPECIMN AEROBIC: CPT | Performed by: EMERGENCY MEDICINE

## 2019-08-25 PROCEDURE — 87205 SMEAR GRAM STAIN: CPT | Performed by: EMERGENCY MEDICINE

## 2019-08-25 PROCEDURE — 87186 SC STD MICRODIL/AGAR DIL: CPT | Performed by: EMERGENCY MEDICINE

## 2019-08-25 PROCEDURE — 87147 CULTURE TYPE IMMUNOLOGIC: CPT | Performed by: EMERGENCY MEDICINE

## 2019-08-25 RX ORDER — CEPHALEXIN 500 MG/1
500 CAPSULE ORAL 4 TIMES DAILY
Qty: 28 CAPSULE | Refills: 0 | Status: SHIPPED | OUTPATIENT
Start: 2019-08-25 | End: 2019-09-01

## 2019-08-25 RX ORDER — CEPHALEXIN 500 MG/1
1000 CAPSULE ORAL ONCE
Status: COMPLETED | OUTPATIENT
Start: 2019-08-25 | End: 2019-08-25

## 2019-08-25 NOTE — ED PROVIDER NOTES
Patient Seen in: THE [facility] Emergency Department In [place]    History   Patient presents with:  Cellulitis (integumentary, infectious)    Stated Complaint:     HPI    This is a 25-year-old female presents with left lower anterior abdominal abscess/cellul Oropharynx is clear and moist.   Lungs: Clear to auscultation bilaterally with no rales, no retractions, and no wheezing. HEART:  Regular rate and rhythm. S1 and S2. No murmurs, no rubs or gallops. ABDOMEN: Soft, nontender and nondistended.   Noted a abs

## 2019-08-28 RX ORDER — CLINDAMYCIN HYDROCHLORIDE 300 MG/1
300 CAPSULE ORAL 3 TIMES DAILY
Qty: 30 CAPSULE | Refills: 0 | Status: SHIPPED | OUTPATIENT
Start: 2019-08-28 | End: 2019-09-07

## 2019-09-03 ENCOUNTER — TELEPHONE (OUTPATIENT)
Dept: FAMILY MEDICINE CLINIC | Facility: CLINIC | Age: 34
End: 2019-09-03

## 2019-09-03 DIAGNOSIS — R19.7 DIARRHEA, UNSPECIFIED TYPE: Primary | ICD-10-CM

## 2019-09-03 NOTE — TELEPHONE ENCOUNTER
Spoke to patient. She was informed to stop clindamycin and follow up with  regarding abscess. Pt states she just picked up her vehicle and is able to come in tomorrow morning.  appt scheduled for tomorrow morning at 10am. Pt will  cup for c

## 2019-09-03 NOTE — TELEPHONE ENCOUNTER
No future appointments. Patient was seen in the ER on 8/24/19- abscess; 1 week follow needed for MRSA  Patient was switched from keflex to clinda due to drug resistance  Patient started noticing gradual onset of diarrhea.  It is now uncontrollable, very

## 2019-09-03 NOTE — TELEPHONE ENCOUNTER
Pt cancelled appt but wants to know if she can wait to be seen until Monday. If Dr. Bam Shah wants to have her be seen before Monday, she is asking if she can be double booked on Friday during her sons appt (1p). Pt would like a call back.

## 2019-09-04 NOTE — TELEPHONE ENCOUNTER
Called pt, she did confirm she will be picking up the [employer] lab testing supplies today. Pt inquiring, does Dr. Zachery Babcock want to see pt this afternoon when she will be coming for the supplies?  Or Friday with son's appointment at 1pm?     Pt REPORTS: Symptom

## 2019-09-04 NOTE — TELEPHONE ENCOUNTER
Since symptoms resolved with stopping clindamycin, will hold off on further testing and appt. Unless symptoms recur. No further appts needed at this time assuming wound on abdomen is healed.

## 2019-09-04 NOTE — TELEPHONE ENCOUNTER
Informed pt of Dr. Sanon Aid recommendations and she expressed understanding and agreement. Task completed.

## 2019-09-10 ENCOUNTER — OFFICE VISIT (OUTPATIENT)
Dept: FAMILY MEDICINE CLINIC | Facility: CLINIC | Age: 34
End: 2019-09-10
Payer: MEDICAID

## 2019-09-10 VITALS
DIASTOLIC BLOOD PRESSURE: 80 MMHG | TEMPERATURE: 98 F | SYSTOLIC BLOOD PRESSURE: 115 MMHG | HEART RATE: 75 BPM | BODY MASS INDEX: 37 KG/M2 | HEIGHT: 61 IN

## 2019-09-10 DIAGNOSIS — J01.00 ACUTE NON-RECURRENT MAXILLARY SINUSITIS: Primary | ICD-10-CM

## 2019-09-10 DIAGNOSIS — Z00.00 ROUTINE ADULT HEALTH MAINTENANCE: ICD-10-CM

## 2019-09-10 PROBLEM — F17.210 CIGARETTE SMOKER: Status: ACTIVE | Noted: 2018-10-18

## 2019-09-10 PROCEDURE — 99213 OFFICE O/P EST LOW 20 MIN: CPT | Performed by: FAMILY MEDICINE

## 2019-09-10 RX ORDER — LEVOFLOXACIN 500 MG/1
500 TABLET, FILM COATED ORAL DAILY
Qty: 10 TABLET | Refills: 0 | Status: SHIPPED | OUTPATIENT
Start: 2019-09-10 | End: 2019-09-20

## 2019-09-10 RX ORDER — FLUTICASONE PROPIONATE 50 MCG
2 SPRAY, SUSPENSION (ML) NASAL DAILY
Qty: 3 BOTTLE | Refills: 3 | Status: SHIPPED | OUTPATIENT
Start: 2019-09-10 | End: 2019-12-02

## 2019-09-10 RX ORDER — LORATADINE 10 MG/1
10 TABLET ORAL DAILY
Qty: 30 TABLET | Refills: 0 | Status: SHIPPED | OUTPATIENT
Start: 2019-09-10 | End: 2020-03-04

## 2019-09-10 NOTE — PROGRESS NOTES
CHIEF COMPLAINT:   Patient has complaint of sinus pain, pressure, congestion. HPI:   Patient has complaint of sinus pain, pressure, congestion, and nasal discharge for the past 1 week(s) and severity is moderate.   Patient does not have a fever with a te Not Asked        Stress Concern: Not Asked        Weight Concern: Not Asked    Social History Narrative      Not on file    Family History   Problem Relation Age of Onset   • High Blood Pressure Father    • Lipids Father    • Hypertension Father    • Vish 0   Fluticasone Propionate 50 MCG/ACT Nasal Suspension 2 sprays by Each Nare route daily. Disp: 1 Bottle Rfl: 0       Antibiotics, tylenol or motrin prn, OTC cold medicine as needed, fluids, Vit C, rest. RTC in 3-5 days if not better.  Risks and benefits of

## 2019-09-19 ENCOUNTER — TELEPHONE (OUTPATIENT)
Dept: FAMILY MEDICINE CLINIC | Facility: CLINIC | Age: 34
End: 2019-09-19

## 2019-09-19 NOTE — TELEPHONE ENCOUNTER
Spoke to pt, pt reports right rib pain and \"lung pain\", started 3 days ago and getting progressively worse. No known injury or activity. Has 2 more day so Antibiotic left. Her coughing has increased.  She reports she has SOB \"due to pain\" and  \"Ibuprof

## 2019-09-24 RX ORDER — ALPRAZOLAM 0.5 MG/1
0.5 TABLET ORAL 2 TIMES DAILY PRN
Qty: 20 TABLET | Refills: 0 | Status: SHIPPED | OUTPATIENT
Start: 2019-09-24 | End: 2020-03-04

## 2019-09-27 ENCOUNTER — TELEPHONE (OUTPATIENT)
Dept: FAMILY MEDICINE CLINIC | Facility: CLINIC | Age: 34
End: 2019-09-27

## 2019-09-27 DIAGNOSIS — Z51.81 ENCOUNTER FOR THERAPEUTIC DRUG MONITORING: ICD-10-CM

## 2019-09-27 DIAGNOSIS — F41.9 ANXIETY: ICD-10-CM

## 2019-09-27 DIAGNOSIS — F32.A DEPRESSION, UNSPECIFIED DEPRESSION TYPE: ICD-10-CM

## 2019-09-27 RX ORDER — FLUOXETINE HYDROCHLORIDE 20 MG/1
20 CAPSULE ORAL DAILY
Qty: 30 CAPSULE | Refills: 1 | Status: SHIPPED | OUTPATIENT
Start: 2019-09-27 | End: 2019-12-02

## 2019-09-27 RX ORDER — FLUOXETINE HYDROCHLORIDE 40 MG/1
40 CAPSULE ORAL DAILY
Qty: 90 CAPSULE | Refills: 1 | Status: CANCELLED | OUTPATIENT
Start: 2019-09-27 | End: 2020-03-25

## 2019-09-27 NOTE — TELEPHONE ENCOUNTER
Pt states that Dr. 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  Please advise

## 2019-09-27 NOTE — TELEPHONE ENCOUNTER
LOV 9/10/19 Pt states she requested Fluoxetine 40 mg and was not aware she was placed on a new medication.     Pt picked up alprazolam. 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

## 2019-09-27 NOTE — TELEPHONE ENCOUNTER
Since she is restarting prozac, need to start at 20mg and can increase to 40mg after 2 wks if needed.

## 2019-09-27 NOTE — TELEPHONE ENCOUNTER
Patient informed of MD recommendations below, patient verbalized understanding with intent to comply. Offered opportunity to ask questions, all questions were answered. No future appointments.

## 2019-10-02 ENCOUNTER — TELEPHONE (OUTPATIENT)
Dept: FAMILY MEDICINE CLINIC | Facility: CLINIC | Age: 34
End: 2019-10-02

## 2019-10-02 NOTE — TELEPHONE ENCOUNTER
Spoke to pt, states that she was given abx on 9/10/19, states she was told to use Lotrimin powder in experience yeast infection.  Pt states she started with symptoms x 1 week ago and Lotrimin did not help.  +redness  +itching  +white discharge  States she i

## 2019-10-02 NOTE — TELEPHONE ENCOUNTER
Patient states she is showing signs of a yeast infection. She said the doctor told her to use an antifungal powder, it is not working, can he call something in for her? Please advise.

## 2019-10-03 RX ORDER — FLUCONAZOLE 150 MG/1
150 TABLET ORAL ONCE
Qty: 1 TABLET | Refills: 0 | Status: SHIPPED | OUTPATIENT
Start: 2019-10-03 | End: 2019-10-03

## 2019-10-14 ENCOUNTER — TELEPHONE (OUTPATIENT)
Dept: FAMILY MEDICINE CLINIC | Facility: CLINIC | Age: 34
End: 2019-10-14

## 2019-10-14 NOTE — TELEPHONE ENCOUNTER
Attempted to reach, [facility] advising ok to hold off on labs. Pt received overdue lab reminder.

## 2019-10-14 NOTE — TELEPHONE ENCOUNTER
Dr. 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 .  Patient was not aware of the lab order. Please advise if this needs to be done right away.     Please call [phone]

## 2019-10-22 RX ORDER — FLUOXETINE HYDROCHLORIDE 40 MG/1
40 CAPSULE ORAL DAILY
Qty: 30 CAPSULE | Refills: 1 | Status: SHIPPED | OUTPATIENT
Start: 2019-10-22 | End: 2019-12-02

## 2019-10-22 NOTE — TELEPHONE ENCOUNTER
FLUoxetine HCl 20 MG Oral Cap  Patient states she is out of her medication since yesterday, was told by the pharmacy that they requested a refill but it was too soon to fill.  She states she can not be out of her medication and the doctor will fill it immed

## 2019-10-22 NOTE — TELEPHONE ENCOUNTER
Last refill sent 09/27/19- # 30 with 1 refill  Patient was told to increase dose of fluoxetine from 20mg to 40mg on 09/27/19  New dose not sent  New dose pended and routed to pcp

## 2019-11-11 ENCOUNTER — OFFICE VISIT (OUTPATIENT)
Dept: FAMILY MEDICINE CLINIC | Facility: CLINIC | Age: 34
End: 2019-11-11
Payer: MEDICAID

## 2019-11-11 VITALS
DIASTOLIC BLOOD PRESSURE: 60 MMHG | BODY MASS INDEX: 38.36 KG/M2 | TEMPERATURE: 99 F | RESPIRATION RATE: 16 BRPM | OXYGEN SATURATION: 99 % | SYSTOLIC BLOOD PRESSURE: 100 MMHG | WEIGHT: 203.19 LBS | HEART RATE: 80 BPM | HEIGHT: 61 IN

## 2019-11-11 DIAGNOSIS — J01.00 ACUTE NON-RECURRENT MAXILLARY SINUSITIS: Primary | ICD-10-CM

## 2019-11-11 PROCEDURE — 99213 OFFICE O/P EST LOW 20 MIN: CPT | Performed by: NURSE PRACTITIONER

## 2019-11-11 RX ORDER — AMOXICILLIN AND CLAVULANATE POTASSIUM 875; 125 MG/1; MG/1
1 TABLET, FILM COATED ORAL 2 TIMES DAILY
Qty: 20 TABLET | Refills: 0 | Status: SHIPPED | OUTPATIENT
Start: 2019-11-11 | End: 2019-11-21

## 2019-11-11 NOTE — PROGRESS NOTES
CHIEF COMPLAINT:   Patient presents with:  Cough: PND, ears plugged, dizzy, nasal congestion x 1.5 weeks       HPI:   Brandon Garces is a 37year old female who presents for cold symptoms for  10  days.  Symptoms have progressed into sinus congestion • Breast Cancer Maternal Grandmother    • Cancer Maternal Grandfather         prostate cancer   • Asthma Mother    • Diabetes Mother       Social History    Tobacco Use      Smoking status: Current Every Day Smoker        Packs/day: 1.00        Types: Ciga PLAN: Meds as below.   Comfort care instructions as listed in Patient Instructions    Meds & Refills for this Visit:  Requested Prescriptions     Signed Prescriptions Disp Refills   • Amoxicillin-Pot Clavulanate 875-125 MG Oral Tab 20 tablet 0     Sig: Take Your doctor may prescribe medications to help treat your sinusitis. If you have an infection, antibiotics can help clear it up. If you are prescribed antibiotics, take all pills on schedule until they are gone, even if you feel better.  Decongestants help r

## 2019-11-17 ENCOUNTER — HOSPITAL ENCOUNTER (EMERGENCY)
Age: 34
Discharge: LEFT BEFORE DISCHARGE | End: 2019-11-17
Payer: MEDICAID

## 2019-11-22 ENCOUNTER — OFFICE VISIT (OUTPATIENT)
Dept: FAMILY MEDICINE CLINIC | Facility: CLINIC | Age: 34
End: 2019-11-22
Payer: MEDICAID

## 2019-11-22 VITALS
RESPIRATION RATE: 16 BRPM | HEART RATE: 79 BPM | TEMPERATURE: 98 F | WEIGHT: 203.38 LBS | BODY MASS INDEX: 38.4 KG/M2 | HEIGHT: 61 IN | DIASTOLIC BLOOD PRESSURE: 70 MMHG | SYSTOLIC BLOOD PRESSURE: 110 MMHG

## 2019-11-22 DIAGNOSIS — J32.9 SINUSITIS, UNSPECIFIED CHRONICITY, UNSPECIFIED LOCATION: Primary | ICD-10-CM

## 2019-11-22 PROCEDURE — 99213 OFFICE O/P EST LOW 20 MIN: CPT | Performed by: FAMILY MEDICINE

## 2019-11-22 RX ORDER — FLUTICASONE PROPIONATE 50 MCG
SPRAY, SUSPENSION (ML) NASAL
Qty: 1 BOTTLE | Refills: 0 | Status: SHIPPED | OUTPATIENT
Start: 2019-11-22 | End: 2019-12-02

## 2019-11-22 NOTE — PROGRESS NOTES
HPI:   Marybeth Moody is a 37year old female that presents with son for sinusitis. The patient was seen by urgent care and started on Augmentin 11/13/19. She complains of sinus headache and nasal drainage currently.  She notes some pain in her an Position: Sitting)   Pulse 79   Temp 98 °F (36.7 °C) (Oral)   Resp 16   Ht 61\"   Wt 203 lb 6.4 oz (92.3 kg)   BMI 38.43 kg/m²  Estimated body mass index is 38.43 kg/m² as calculated from the following:    Height as of this encounter: 61\".     Weight as of visit.     By signing my name below, Sharyle Inch,  attest that this documentation has been prepared under the direction and in the presence of Ana Paula Devries MD.   Electronically Signed: Jaylin Degroot, 11/22/2019, 11:59 AM.

## 2019-12-02 ENCOUNTER — OFFICE VISIT (OUTPATIENT)
Dept: FAMILY MEDICINE CLINIC | Facility: CLINIC | Age: 34
End: 2019-12-02
Payer: MEDICAID

## 2019-12-02 VITALS — HEART RATE: 82 BPM | SYSTOLIC BLOOD PRESSURE: 108 MMHG | DIASTOLIC BLOOD PRESSURE: 70 MMHG | TEMPERATURE: 98 F

## 2019-12-02 DIAGNOSIS — R20.2 NUMBNESS AND TINGLING OF LEFT ARM AND LEG: ICD-10-CM

## 2019-12-02 DIAGNOSIS — J01.00 ACUTE NON-RECURRENT MAXILLARY SINUSITIS: Primary | ICD-10-CM

## 2019-12-02 DIAGNOSIS — J34.89 SINUS PRESSURE: ICD-10-CM

## 2019-12-02 DIAGNOSIS — R20.0 NUMBNESS AND TINGLING OF LEFT ARM AND LEG: ICD-10-CM

## 2019-12-02 DIAGNOSIS — G43.701 CHRONIC MIGRAINE WITHOUT AURA WITH STATUS MIGRAINOSUS, NOT INTRACTABLE: ICD-10-CM

## 2019-12-02 DIAGNOSIS — R21 RASH: ICD-10-CM

## 2019-12-02 DIAGNOSIS — F32.A DEPRESSION, UNSPECIFIED DEPRESSION TYPE: ICD-10-CM

## 2019-12-02 PROCEDURE — 99214 OFFICE O/P EST MOD 30 MIN: CPT | Performed by: FAMILY MEDICINE

## 2019-12-02 RX ORDER — NYSTATIN 10B UNIT
POWDER (EA) MISCELLANEOUS
Qty: 1 BOTTLE | Refills: 0 | Status: SHIPPED | OUTPATIENT
Start: 2019-12-02 | End: 2020-03-04

## 2019-12-02 RX ORDER — FLUCONAZOLE 150 MG/1
TABLET ORAL
Refills: 0 | COMMUNITY
Start: 2019-10-03 | End: 2020-03-04

## 2019-12-02 RX ORDER — FLUOXETINE HYDROCHLORIDE 40 MG/1
40 CAPSULE ORAL DAILY
Qty: 30 CAPSULE | Refills: 1 | Status: SHIPPED | OUTPATIENT
Start: 2019-12-02 | End: 2020-03-04

## 2019-12-02 RX ORDER — BUTALBITAL, ACETAMINOPHEN AND CAFFEINE 300; 40; 50 MG/1; MG/1; MG/1
1 CAPSULE ORAL EVERY 4 HOURS PRN
Qty: 84 CAPSULE | Refills: 0 | Status: SHIPPED | OUTPATIENT
Start: 2019-12-02 | End: 2020-03-16

## 2019-12-02 NOTE — PROGRESS NOTES
HPI:   Shashi Lombardo is a 37year old female that presents for follow up of sinusitis. The patient complains of sinus pressure under her eyes. She is taking Flonase currently. She notes dizziness with sinus pressure for the last two days.     The daily., Disp: 30 tablet, Rfl: 0  •  Montelukast Sodium 10 MG Oral Tab, Take 1 tablet (10 mg total) by mouth nightly., Disp: 90 tablet, Rfl: 0  •  Fluticasone Propionate 50 MCG/ACT Nasal Suspension, 2 sprays by Each Nare route daily. , Disp: 1 Bottle, Rfl: 0 of left arm and leg  Follow up with [facility]. Depression, unspecified depression type  Refilled Fluoxetine.     Other orders  -     FLULAVAL INFLUENZA VACCINE QUAD PRESERVATIVE FREE 0.5 ML    Risks, benefits, and alternatives of current treatmen

## 2019-12-09 ENCOUNTER — OFFICE VISIT (OUTPATIENT)
Dept: FAMILY MEDICINE CLINIC | Facility: CLINIC | Age: 34
End: 2019-12-09
Payer: MEDICAID

## 2019-12-09 VITALS
TEMPERATURE: 98 F | BODY MASS INDEX: 39.01 KG/M2 | WEIGHT: 206.63 LBS | HEART RATE: 75 BPM | RESPIRATION RATE: 16 BRPM | DIASTOLIC BLOOD PRESSURE: 60 MMHG | HEIGHT: 61 IN | SYSTOLIC BLOOD PRESSURE: 115 MMHG

## 2019-12-09 DIAGNOSIS — L85.3 DRY SKIN: ICD-10-CM

## 2019-12-09 DIAGNOSIS — J01.00 ACUTE NON-RECURRENT MAXILLARY SINUSITIS: Primary | ICD-10-CM

## 2019-12-09 DIAGNOSIS — R21 RASH: ICD-10-CM

## 2019-12-09 PROCEDURE — 99213 OFFICE O/P EST LOW 20 MIN: CPT | Performed by: FAMILY MEDICINE

## 2019-12-09 RX ORDER — AZITHROMYCIN 250 MG/1
TABLET, FILM COATED ORAL
Qty: 6 TABLET | Refills: 0 | Status: SHIPPED | OUTPATIENT
Start: 2019-12-09 | End: 2019-12-17

## 2019-12-09 RX ORDER — NYSTATIN 100000 [USP'U]/G
POWDER TOPICAL
Refills: 0 | COMMUNITY
Start: 2019-12-03 | End: 2020-03-04

## 2019-12-09 NOTE — PROGRESS NOTES
HPI:   Celestina Jolley is a 37year old female that presents with son for follow up on nasal congestion and rash. The patient's nasal congestion, sinus pressure, and dizziness still present.  She feels she has had some nasal drainage down her throa Montelukast Sodium 10 MG Oral Tab, Take 1 tablet (10 mg total) by mouth nightly., Disp: 90 tablet, Rfl: 0  •  Fluticasone Propionate 50 MCG/ACT Nasal Suspension, 2 sprays by Each Nare route daily. , Disp: 1 Bottle, Rfl: 0    REVIEW OF SYSTEMS:     Comprehe alternatives of current treatment plan discussed in detail. Questions and concerns addressed. Red flags to RTC or ED reviewed. Patient (or parent) agrees to plan. No follow-ups on file. Ranjana Perdue M.D.   Family Medicine   12/9/2019  1:11 PM

## 2019-12-17 ENCOUNTER — OFFICE VISIT (OUTPATIENT)
Dept: FAMILY MEDICINE CLINIC | Facility: CLINIC | Age: 34
End: 2019-12-17
Payer: MEDICAID

## 2019-12-17 VITALS
TEMPERATURE: 98 F | RESPIRATION RATE: 16 BRPM | WEIGHT: 205.63 LBS | DIASTOLIC BLOOD PRESSURE: 60 MMHG | SYSTOLIC BLOOD PRESSURE: 112 MMHG | HEART RATE: 82 BPM | BODY MASS INDEX: 38.82 KG/M2 | HEIGHT: 61 IN

## 2019-12-17 DIAGNOSIS — R05.9 COUGH: ICD-10-CM

## 2019-12-17 DIAGNOSIS — J32.9 SINUSITIS, UNSPECIFIED CHRONICITY, UNSPECIFIED LOCATION: Primary | ICD-10-CM

## 2019-12-17 PROCEDURE — 99213 OFFICE O/P EST LOW 20 MIN: CPT | Performed by: FAMILY MEDICINE

## 2020-02-12 ENCOUNTER — OFFICE VISIT (OUTPATIENT)
Dept: FAMILY MEDICINE CLINIC | Facility: CLINIC | Age: 35
End: 2020-02-12
Payer: MEDICAID

## 2020-02-12 ENCOUNTER — TELEPHONE (OUTPATIENT)
Dept: FAMILY MEDICINE CLINIC | Facility: CLINIC | Age: 35
End: 2020-02-12

## 2020-02-12 VITALS
DIASTOLIC BLOOD PRESSURE: 70 MMHG | HEART RATE: 90 BPM | HEIGHT: 61 IN | RESPIRATION RATE: 16 BRPM | TEMPERATURE: 98 F | WEIGHT: 211.63 LBS | OXYGEN SATURATION: 95 % | BODY MASS INDEX: 39.95 KG/M2 | SYSTOLIC BLOOD PRESSURE: 115 MMHG

## 2020-02-12 DIAGNOSIS — J01.00 ACUTE NON-RECURRENT MAXILLARY SINUSITIS: ICD-10-CM

## 2020-02-12 PROBLEM — R20.9 DISTURBANCE OF SKIN SENSATION: Status: ACTIVE | Noted: 2020-02-12

## 2020-02-12 PROBLEM — K52.9 OTHER AND UNSPECIFIED NONINFECTIOUS GASTROENTERITIS AND COLITIS: Status: ACTIVE | Noted: 2020-02-12

## 2020-02-12 PROBLEM — R11.10 VOMITING ALONE: Status: ACTIVE | Noted: 2020-02-12

## 2020-02-12 PROBLEM — F43.12 POST-TRAUMATIC STRESS DISORDER, CHRONIC: Status: ACTIVE | Noted: 2020-02-12

## 2020-02-12 PROBLEM — S20.221A: Status: ACTIVE | Noted: 2020-02-12

## 2020-02-12 PROBLEM — R11.0 NAUSEA WITHOUT VOMITING: Status: ACTIVE | Noted: 2020-02-12

## 2020-02-12 PROBLEM — L02.91 CELLULITIS AND ABSCESS: Status: ACTIVE | Noted: 2020-02-12

## 2020-02-12 PROBLEM — S93.409A SPRAIN OF ANKLE: Status: ACTIVE | Noted: 2020-02-12

## 2020-02-12 PROBLEM — Z97.5 NEXPLANON IN PLACE: Status: ACTIVE | Noted: 2020-02-12

## 2020-02-12 PROBLEM — N64.4 MASTODYNIA: Status: ACTIVE | Noted: 2020-02-12

## 2020-02-12 PROBLEM — J32.9 CHRONIC SINUSITIS: Status: ACTIVE | Noted: 2020-02-12

## 2020-02-12 PROBLEM — M54.9 BACKACHE: Status: ACTIVE | Noted: 2020-02-12

## 2020-02-12 PROBLEM — F17.200 TOBACCO USE DISORDER: Status: ACTIVE | Noted: 2020-02-12

## 2020-02-12 PROBLEM — N94.9 SYMPTOM ASSOCIATED WITH FEMALE GENITAL ORGANS: Status: ACTIVE | Noted: 2020-02-12

## 2020-02-12 PROBLEM — R74.8 ABNORMAL LEVELS OF OTHER SERUM ENZYMES: Status: ACTIVE | Noted: 2020-02-12

## 2020-02-12 PROBLEM — H66.91 RIGHT ACUTE OTITIS MEDIA: Status: ACTIVE | Noted: 2020-02-12

## 2020-02-12 PROBLEM — F43.21 ADJUSTMENT DISORDER WITH DEPRESSED MOOD: Status: ACTIVE | Noted: 2020-02-12

## 2020-02-12 PROBLEM — Z72.0 SMOKING TRYING TO QUIT: Status: ACTIVE | Noted: 2020-02-12

## 2020-02-12 PROBLEM — Z30.40 CONTRACEPTIVE SURVEILLANCE: Status: ACTIVE | Noted: 2020-02-12

## 2020-02-12 PROBLEM — Z32.02 PREGNANCY EXAMINATION OR TEST, NEGATIVE RESULT: Status: ACTIVE | Noted: 2020-02-12

## 2020-02-12 PROBLEM — L03.90 CELLULITIS AND ABSCESS: Status: ACTIVE | Noted: 2020-02-12

## 2020-02-12 PROCEDURE — 99213 OFFICE O/P EST LOW 20 MIN: CPT | Performed by: FAMILY MEDICINE

## 2020-02-12 RX ORDER — AZITHROMYCIN 250 MG/1
TABLET, FILM COATED ORAL
Qty: 6 TABLET | Refills: 0 | Status: SHIPPED | OUTPATIENT
Start: 2020-02-12 | End: 2020-03-04

## 2020-02-12 NOTE — PROGRESS NOTES
HPI:   Nhung Sandoval is a 37year old female that presents for flu like symptoms. Symptoms include chest congestion and pressure, ear aches and irritation, and sinus pressure. Pt notes pain when sinuses are palpated.  Pt is having trouble     Pt c/o TOPICALLY TWICE DAILY AS NEEDED, Disp: , Rfl: 0  •  fluconazole 150 MG Oral Tab, TAKE ONE TABLET BY MOUTH AS A ONE TIME DOSE, Disp: , Rfl: 0  •  FLUoxetine HCl 40 MG Oral Cap, Take 1 capsule (40 mg total) by mouth daily. , Disp: 30 capsule, Rfl: 1  •  Nysta gallops. LUNGS: Clear to auscultation bilterally, no rales/rhonchi/wheezing. ABDOMEN:  Soft, nondistended, nontender, bowel sounds normal in all 4 quadrants, no masses, no hepatosplenomegaly. EXTREMITIES:  No edema, no cyanosis, 2+ radial pulses b/l.

## 2020-02-12 NOTE — TELEPHONE ENCOUNTER
Per Dr Bales Neighbours patient is in a lot of pain, experiencing pain in the implant site. States she needs it removed. Needs sooner appt with OB. No swelling or redness    OV 2/12/2020 Will refer pt to a gynecologist for further evaluation on implant.      Future A

## 2020-02-13 ENCOUNTER — TELEPHONE (OUTPATIENT)
Dept: OBGYN CLINIC | Facility: CLINIC | Age: 35
End: 2020-02-13

## 2020-02-13 NOTE — TELEPHONE ENCOUNTER
Spoke to ΣΑΡΑΝΤΙ at [facility] in [place], was advised that patient has never been seen at their office. She is a new patient and she may have had implant placed elsewhere.  She will send a message to [facility] RN and MD's to see if patient can be seen soone

## 2020-02-13 NOTE — TELEPHONE ENCOUNTER
Discussed with Janie Smallwood. Ok to schedule for 2/19/20 at 1300 in [address]. May or may not be able to remove at this visit. Will need to assess at visit. Call to patient; no answer. Left message to call back.

## 2020-02-13 NOTE — TELEPHONE ENCOUNTER
Received call from Robert at Dr Herlinda Ibanez office, requesting pt be seen sooner for Nexplanon removal due to pain issues with implant. Pt will be new to our office but not sure where implant was originally placed.    Appt is scheduled as below but doctor

## 2020-02-17 NOTE — TELEPHONE ENCOUNTER
Spoke to patient and advised that OB-gyn office has been trying to contact her to schedule a sooner appt for 2/19 at 1pm, pt states that she will call them back to get this scheduled.

## 2020-02-17 NOTE — TELEPHONE ENCOUNTER
Pt called back today at 3:05 pm about appt and I advised her I would check to see if appt time was still available (and it wasn't, office contacted her on 2 separate days, messages were left) Pt became angry and in a rude tone stated \"'she didn't want us

## 2020-03-04 ENCOUNTER — OFFICE VISIT (OUTPATIENT)
Dept: OBGYN CLINIC | Facility: CLINIC | Age: 35
End: 2020-03-04
Payer: MEDICAID

## 2020-03-04 VITALS
DIASTOLIC BLOOD PRESSURE: 74 MMHG | SYSTOLIC BLOOD PRESSURE: 116 MMHG | HEART RATE: 76 BPM | BODY MASS INDEX: 39.91 KG/M2 | WEIGHT: 211.38 LBS | HEIGHT: 61 IN

## 2020-03-04 DIAGNOSIS — Z30.09 BIRTH CONTROL COUNSELING: Primary | ICD-10-CM

## 2020-03-04 DIAGNOSIS — Z30.49 ENCOUNTER FOR SURVEILLANCE OF OTHER CONTRACEPTIVE: ICD-10-CM

## 2020-03-04 DIAGNOSIS — N92.1 BREAKTHROUGH BLEEDING ON NEXPLANON: ICD-10-CM

## 2020-03-04 DIAGNOSIS — Z97.5 BREAKTHROUGH BLEEDING ON NEXPLANON: ICD-10-CM

## 2020-03-04 DIAGNOSIS — Z30.46 ENCOUNTER FOR REMOVAL OF SUBDERMAL CONTRACEPTIVE IMPLANT: ICD-10-CM

## 2020-03-04 PROCEDURE — 99213 OFFICE O/P EST LOW 20 MIN: CPT | Performed by: NURSE PRACTITIONER

## 2020-03-04 PROCEDURE — 96372 THER/PROPH/DIAG INJ SC/IM: CPT | Performed by: NURSE PRACTITIONER

## 2020-03-04 PROCEDURE — 11982 REMOVE DRUG IMPLANT DEVICE: CPT | Performed by: NURSE PRACTITIONER

## 2020-03-04 RX ORDER — MEDROXYPROGESTERONE ACETATE 150 MG/ML
150 INJECTION, SUSPENSION INTRAMUSCULAR ONCE
Status: COMPLETED | OUTPATIENT
Start: 2020-03-04 | End: 2020-03-04

## 2020-03-04 RX ORDER — FLUOXETINE HYDROCHLORIDE 40 MG/1
CAPSULE ORAL
Qty: 30 CAPSULE | Refills: 5 | Status: SHIPPED | OUTPATIENT
Start: 2020-03-04 | End: 2021-02-23

## 2020-03-04 RX ADMIN — MEDROXYPROGESTERONE ACETATE 150 MG: 150 INJECTION, SUSPENSION INTRAMUSCULAR at 13:17:00

## 2020-03-04 NOTE — PROGRESS NOTES
Here for new gynecology visit. 37year old G 2 P 0. No LMP recorded. Patient has had an implant. .     Here for removal of the Nexplanon device. She has had pain at the site of the device for 1 year as well as some tingling in her arm.  She also still has urgency, frequency, MJ, bladder problems in past.  Extremities:  No pain, swelling, arthralgias, joint swelling. Neurological:  No headaches, depression, anxiety, migraines, seizure disorders, behavioral problems.                /74   Pulse 76   Ht

## 2020-03-04 NOTE — TELEPHONE ENCOUNTER
Requesting refill on Fluoxetine 40 mg # 30  LOV- has not been seen except for acute visits.   Future Appointments   Date Time Provider Harriet Bell   3/4/2020 12:30 PM Flavio Moritz, APN [facility] OB[facility]N P [facility] [address]     Last Refill- 12.2.19 # 30 with 1 refill

## 2020-03-16 ENCOUNTER — OFFICE VISIT (OUTPATIENT)
Dept: FAMILY MEDICINE CLINIC | Facility: CLINIC | Age: 35
End: 2020-03-16
Payer: MEDICAID

## 2020-03-16 VITALS
HEART RATE: 72 BPM | WEIGHT: 205.81 LBS | HEIGHT: 61 IN | DIASTOLIC BLOOD PRESSURE: 80 MMHG | TEMPERATURE: 98 F | RESPIRATION RATE: 16 BRPM | BODY MASS INDEX: 38.86 KG/M2 | SYSTOLIC BLOOD PRESSURE: 115 MMHG

## 2020-03-16 DIAGNOSIS — J30.9 ALLERGIC RHINITIS, UNSPECIFIED SEASONALITY, UNSPECIFIED TRIGGER: Primary | ICD-10-CM

## 2020-03-16 DIAGNOSIS — G43.701 CHRONIC MIGRAINE WITHOUT AURA WITH STATUS MIGRAINOSUS, NOT INTRACTABLE: ICD-10-CM

## 2020-03-16 PROBLEM — Z30.40 CONTRACEPTIVE SURVEILLANCE: Status: RESOLVED | Noted: 2020-02-12 | Resolved: 2020-03-16

## 2020-03-16 PROBLEM — F17.200 TOBACCO DEPENDENCY: Status: RESOLVED | Noted: 2018-10-11 | Resolved: 2020-03-16

## 2020-03-16 PROBLEM — L03.90 CELLULITIS AND ABSCESS: Status: RESOLVED | Noted: 2020-02-12 | Resolved: 2020-03-16

## 2020-03-16 PROBLEM — R11.0 NAUSEA WITHOUT VOMITING: Status: RESOLVED | Noted: 2020-02-12 | Resolved: 2020-03-16

## 2020-03-16 PROBLEM — N64.4 MASTODYNIA: Status: RESOLVED | Noted: 2020-02-12 | Resolved: 2020-03-16

## 2020-03-16 PROBLEM — H66.91 RIGHT ACUTE OTITIS MEDIA: Status: RESOLVED | Noted: 2020-02-12 | Resolved: 2020-03-16

## 2020-03-16 PROBLEM — R20.9 DISTURBANCE OF SKIN SENSATION: Status: RESOLVED | Noted: 2020-02-12 | Resolved: 2020-03-16

## 2020-03-16 PROBLEM — L02.91 CELLULITIS AND ABSCESS: Status: RESOLVED | Noted: 2020-02-12 | Resolved: 2020-03-16

## 2020-03-16 PROBLEM — N39.0 URINARY TRACT INFECTION WITHOUT HEMATURIA: Status: RESOLVED | Noted: 2018-10-01 | Resolved: 2020-03-16

## 2020-03-16 PROBLEM — K52.9 OTHER AND UNSPECIFIED NONINFECTIOUS GASTROENTERITIS AND COLITIS: Status: RESOLVED | Noted: 2020-02-12 | Resolved: 2020-03-16

## 2020-03-16 PROBLEM — N94.9 SYMPTOM ASSOCIATED WITH FEMALE GENITAL ORGANS: Status: RESOLVED | Noted: 2020-02-12 | Resolved: 2020-03-16

## 2020-03-16 PROBLEM — R74.8 ABNORMAL LEVELS OF OTHER SERUM ENZYMES: Status: RESOLVED | Noted: 2020-02-12 | Resolved: 2020-03-16

## 2020-03-16 PROBLEM — Z32.02 PREGNANCY EXAMINATION OR TEST, NEGATIVE RESULT: Status: RESOLVED | Noted: 2020-02-12 | Resolved: 2020-03-16

## 2020-03-16 PROBLEM — S20.221A: Status: RESOLVED | Noted: 2020-02-12 | Resolved: 2020-03-16

## 2020-03-16 PROBLEM — M79.671 RIGHT FOOT PAIN: Status: RESOLVED | Noted: 2018-04-02 | Resolved: 2020-03-16

## 2020-03-16 PROBLEM — M25.571 ACUTE RIGHT ANKLE PAIN: Status: RESOLVED | Noted: 2018-04-02 | Resolved: 2020-03-16

## 2020-03-16 PROBLEM — M54.9 BACKACHE: Status: RESOLVED | Noted: 2020-02-12 | Resolved: 2020-03-16

## 2020-03-16 PROBLEM — J01.00 ACUTE NON-RECURRENT MAXILLARY SINUSITIS: Status: RESOLVED | Noted: 2018-10-11 | Resolved: 2020-03-16

## 2020-03-16 PROBLEM — F17.210 CIGARETTE SMOKER: Status: RESOLVED | Noted: 2018-10-18 | Resolved: 2020-03-16

## 2020-03-16 PROBLEM — R42 DIZZINESS: Status: RESOLVED | Noted: 2018-10-01 | Resolved: 2020-03-16

## 2020-03-16 PROBLEM — R11.10 VOMITING ALONE: Status: RESOLVED | Noted: 2020-02-12 | Resolved: 2020-03-16

## 2020-03-16 PROBLEM — S93.409A SPRAIN OF ANKLE: Status: RESOLVED | Noted: 2020-02-12 | Resolved: 2020-03-16

## 2020-03-16 PROBLEM — R82.5 POSITIVE URINE DRUG SCREEN: Status: RESOLVED | Noted: 2018-10-01 | Resolved: 2020-03-16

## 2020-03-16 PROCEDURE — 99213 OFFICE O/P EST LOW 20 MIN: CPT | Performed by: FAMILY MEDICINE

## 2020-03-16 RX ORDER — BUTALBITAL, ACETAMINOPHEN AND CAFFEINE 300; 40; 50 MG/1; MG/1; MG/1
1 CAPSULE ORAL EVERY 6 HOURS PRN
Qty: 40 CAPSULE | Refills: 0 | Status: SHIPPED | OUTPATIENT
Start: 2020-03-16 | End: 2020-09-03

## 2020-03-16 NOTE — PROGRESS NOTES
HPI:   Gillian Benoit is a 37year old female that presents for 1 week f/u for sinusitis. Pt has improved since last visit but still feels stuffy/congested.      Pt had nexplanon removed from left arm by gynecologist and had a depo-provera shot admin effusion   Nose: patent, no nasal discharge    Throat:  No tonsillar erythema or exudate. Mouth:  No oral lesions or ulcerations, good dentition. NECK: Supple, no cervical LAD, no thyromegaly.   SKIN: No rashes, no skin lesion, no bruising, good turgor

## 2020-03-21 ENCOUNTER — TELEPHONE (OUTPATIENT)
Dept: FAMILY MEDICINE CLINIC | Facility: CLINIC | Age: 35
End: 2020-03-21

## 2020-03-21 ENCOUNTER — OFFICE VISIT (OUTPATIENT)
Dept: FAMILY MEDICINE CLINIC | Facility: CLINIC | Age: 35
End: 2020-03-21
Payer: MEDICAID

## 2020-03-21 VITALS — TEMPERATURE: 98 F

## 2020-03-21 DIAGNOSIS — J01.90 ACUTE RHINOSINUSITIS: Primary | ICD-10-CM

## 2020-03-21 DIAGNOSIS — Z02.9 ADMINISTRATIVE ENCOUNTER: Primary | ICD-10-CM

## 2020-03-21 PROCEDURE — 99441 PHONE E/M BY PHYS 5-10 MIN: CPT | Performed by: NURSE PRACTITIONER

## 2020-03-21 RX ORDER — LORATADINE 10 MG/1
10 TABLET ORAL DAILY
Qty: 30 TABLET | Refills: 0 | Status: SHIPPED | OUTPATIENT
Start: 2020-03-21 | End: 2020-07-22

## 2020-03-21 RX ORDER — FLUTICASONE PROPIONATE 50 MCG
2 SPRAY, SUSPENSION (ML) NASAL DAILY
Qty: 1 BOTTLE | Refills: 0 | Status: SHIPPED | OUTPATIENT
Start: 2020-03-21 | End: 2020-08-06

## 2020-03-21 RX ORDER — AMOXICILLIN AND CLAVULANATE POTASSIUM 875; 125 MG/1; MG/1
1 TABLET, FILM COATED ORAL 2 TIMES DAILY
Qty: 20 TABLET | Refills: 0 | Status: SHIPPED | OUTPATIENT
Start: 2020-03-21 | End: 2020-03-31

## 2020-03-21 NOTE — TELEPHONE ENCOUNTER
Virtual/Telephone Check-In    Kaley Navarro verbally consents to a Virtual/Telephone Check-In service on 03/21/20.   Patient understands and accepts financial responsibility for any deductible, co-insurance and/or co-pays associated with this servic

## 2020-03-21 NOTE — PROGRESS NOTES
Due to URI symptoms during COVID - 19 outbreak, patient was evaluated by phone. See telephone encounter for further details.

## 2020-03-29 ENCOUNTER — TELEPHONE (OUTPATIENT)
Dept: FAMILY MEDICINE CLINIC | Facility: CLINIC | Age: 35
End: 2020-03-29

## 2020-03-29 NOTE — TELEPHONE ENCOUNTER
Pt called on call, had gone to [facility] about 1 week ago for cough and congestion. About 3 days ago she started to have R rib pain. She states coughing, sneezing, movement increase the pain. She has not tried anything to alleviate the pain.   She has not star

## 2020-03-31 ENCOUNTER — TELEPHONE (OUTPATIENT)
Dept: FAMILY MEDICINE CLINIC | Facility: CLINIC | Age: 35
End: 2020-03-31

## 2020-03-31 DIAGNOSIS — R05.9 COUGH: Primary | ICD-10-CM

## 2020-03-31 DIAGNOSIS — R07.9 CHEST PAIN, UNSPECIFIED TYPE: ICD-10-CM

## 2020-03-31 NOTE — TELEPHONE ENCOUNTER
Pt is still having R sided chest pain and congestion/cough. No new symptoms. Patient has been off of work. Patient has been taking antibiotic-pt does not think this is working because she still has symptoms. Patient will need a note for work as well.   Glenda Womack

## 2020-03-31 NOTE — TELEPHONE ENCOUNTER
- Pt went to answer call from [employer]. Pt will wait for a call back.   [phone]

## 2020-03-31 NOTE — TELEPHONE ENCOUNTER
I did talk with patient. She has right sided chest pain with cough, taking augmentin right now, does not seem to help too much. Denies shortness of breath or wheezing. Denies fever or chills. No exposure to covid-19, no recent travel.      Will check ch

## 2020-04-01 ENCOUNTER — TELEPHONE (OUTPATIENT)
Dept: FAMILY MEDICINE CLINIC | Facility: CLINIC | Age: 35
End: 2020-04-01

## 2020-04-01 ENCOUNTER — LAB ENCOUNTER (OUTPATIENT)
Dept: LAB | Age: 35
End: 2020-04-01
Attending: FAMILY MEDICINE
Payer: MEDICAID

## 2020-04-01 ENCOUNTER — HOSPITAL ENCOUNTER (OUTPATIENT)
Dept: GENERAL RADIOLOGY | Age: 35
Discharge: HOME | End: 2020-04-01
Attending: FAMILY MEDICINE
Payer: MEDICAID

## 2020-04-01 DIAGNOSIS — R07.9 CHEST PAIN, UNSPECIFIED TYPE: ICD-10-CM

## 2020-04-01 DIAGNOSIS — R05.9 COUGH: ICD-10-CM

## 2020-04-01 PROCEDURE — 85025 COMPLETE CBC W/AUTO DIFF WBC: CPT

## 2020-04-01 PROCEDURE — 71046 X-RAY EXAM CHEST 2 VIEWS: CPT | Performed by: FAMILY MEDICINE

## 2020-04-01 PROCEDURE — 36415 COLL VENOUS BLD VENIPUNCTURE: CPT

## 2020-04-01 NOTE — TELEPHONE ENCOUNTER
Called pt, informed that cbc and chest x-ray are normal, reassured. Her right side rib pain probably is muscular in nature from previous coughing. She has tried otc nsaid , not helped.      Will try diclofenac 50mg tid prn, pt counseled re: taking with food

## 2020-04-06 ENCOUNTER — TELEPHONE (OUTPATIENT)
Dept: FAMILY MEDICINE CLINIC | Facility: CLINIC | Age: 35
End: 2020-04-06

## 2020-04-06 NOTE — TELEPHONE ENCOUNTER
- Pt is feeling much better and would like note return date to 04/06/2020. Pt would like to go in for shift and needs note as soon as possble. Pt needs to be notified in the next 30 to 45 minutes if possible. Please advise.   [phone]

## 2020-04-10 ENCOUNTER — TELEPHONE (OUTPATIENT)
Dept: FAMILY MEDICINE CLINIC | Facility: CLINIC | Age: 35
End: 2020-04-10

## 2020-04-10 NOTE — TELEPHONE ENCOUNTER
See attached phone message.   Pt reports symptoms started 3 days, increasingly worse    Pt report pain for 6 hours last night, couldn't sleep    +Urine frequency   +Urine urgency  +Burning sensation   +Pain upon urination   +Flank pain yes middle only  +Abd

## 2020-04-10 NOTE — TELEPHONE ENCOUNTER
Recalled pt, advised evaluation. Advised pt to on go online to [facility], click on Immediate Care and advise them of pt planning on going there.  Advised that Lubna Galeano has 4 IC open today, gave those towns the 4 are in, with this being Regina's last day

## 2020-04-11 ENCOUNTER — MOBILE ENCOUNTER (OUTPATIENT)
Dept: FAMILY MEDICINE CLINIC | Facility: CLINIC | Age: 35
End: 2020-04-11

## 2020-04-11 RX ORDER — CIPROFLOXACIN 500 MG/1
500 TABLET, FILM COATED ORAL 2 TIMES DAILY
Qty: 14 TABLET | Refills: 0 | Status: SHIPPED | OUTPATIENT
Start: 2020-04-11 | End: 2020-08-11

## 2020-04-11 NOTE — PROGRESS NOTES
On-call, patient called stating having dysuria over the past 4 days denies any fever chills nausea or vomiting. She would like antibiotic called in if possible.   I will send Cipro 5 mg twice a day for 7 days, maintain water intake and counseled regarding

## 2020-05-03 ENCOUNTER — HOSPITAL ENCOUNTER (EMERGENCY)
Age: 35
Discharge: HOME | End: 2020-05-03
Attending: EMERGENCY MEDICINE
Payer: MEDICAID

## 2020-05-03 ENCOUNTER — APPOINTMENT (OUTPATIENT)
Dept: GENERAL RADIOLOGY | Age: 35
End: 2020-05-03
Attending: EMERGENCY MEDICINE
Payer: MEDICAID

## 2020-05-03 VITALS
SYSTOLIC BLOOD PRESSURE: 138 MMHG | OXYGEN SATURATION: 100 % | WEIGHT: 210 LBS | DIASTOLIC BLOOD PRESSURE: 62 MMHG | HEART RATE: 102 BPM | BODY MASS INDEX: 33.75 KG/M2 | HEIGHT: 66 IN | TEMPERATURE: 99 F | RESPIRATION RATE: 20 BRPM

## 2020-05-03 DIAGNOSIS — T07.XXXA MULTIPLE CONTUSIONS: ICD-10-CM

## 2020-05-03 DIAGNOSIS — S93.401A SPRAIN OF RIGHT ANKLE, UNSPECIFIED LIGAMENT, INITIAL ENCOUNTER: Primary | ICD-10-CM

## 2020-05-03 PROCEDURE — 73080 X-RAY EXAM OF ELBOW: CPT | Performed by: EMERGENCY MEDICINE

## 2020-05-03 PROCEDURE — 72170 X-RAY EXAM OF PELVIS: CPT | Performed by: EMERGENCY MEDICINE

## 2020-05-03 PROCEDURE — 73630 X-RAY EXAM OF FOOT: CPT | Performed by: EMERGENCY MEDICINE

## 2020-05-03 PROCEDURE — 73590 X-RAY EXAM OF LOWER LEG: CPT | Performed by: EMERGENCY MEDICINE

## 2020-05-03 PROCEDURE — 99284 EMERGENCY DEPT VISIT MOD MDM: CPT

## 2020-05-03 RX ORDER — ACETAMINOPHEN AND CODEINE PHOSPHATE 300; 30 MG/1; MG/1
1-2 TABLET ORAL EVERY 6 HOURS PRN
Qty: 10 TABLET | Refills: 0 | Status: SHIPPED | OUTPATIENT
Start: 2020-05-03 | End: 2020-05-10

## 2020-05-03 NOTE — ED INITIAL ASSESSMENT (HPI)
Pt to ed from home with c/o r ankle pain and injury, limited movement of  Ft/ankle, edema and pain to area.

## 2020-05-03 NOTE — ED PROVIDER NOTES
Patient Seen in: THE [facility] Emergency Department In [place]      History   Patient presents with:  Lower Extremity Injury    Stated Complaint: Twisted her ankle when her dog knocked her down.     HPI    The patient was taking her dog for a walk this evenin bruising, tenderness. Full active range of motion  Right elbow: Tender over the radial head. No bruising or swelling. Full active range of motion  Right wrist and hand: No tenderness or swelling. Unremarkable.   Radial pulse normal.  Radial, ulnar nerve

## 2020-06-01 ENCOUNTER — OFFICE VISIT (OUTPATIENT)
Dept: OBGYN CLINIC | Facility: CLINIC | Age: 35
End: 2020-06-01
Payer: MEDICAID

## 2020-06-01 VITALS — DIASTOLIC BLOOD PRESSURE: 70 MMHG | BODY MASS INDEX: 35 KG/M2 | WEIGHT: 219 LBS | SYSTOLIC BLOOD PRESSURE: 110 MMHG

## 2020-06-01 DIAGNOSIS — Z01.419 WELL WOMAN EXAM WITH ROUTINE GYNECOLOGICAL EXAM: Primary | ICD-10-CM

## 2020-06-01 DIAGNOSIS — Z12.4 CERVICAL CANCER SCREENING: ICD-10-CM

## 2020-06-01 PROCEDURE — 88175 CYTOPATH C/V AUTO FLUID REDO: CPT | Performed by: OBSTETRICS & GYNECOLOGY

## 2020-06-01 PROCEDURE — 99395 PREV VISIT EST AGE 18-39: CPT | Performed by: OBSTETRICS & GYNECOLOGY

## 2020-06-01 PROCEDURE — 87624 HPV HI-RISK TYP POOLED RSLT: CPT | Performed by: OBSTETRICS & GYNECOLOGY

## 2020-06-01 PROCEDURE — 96372 THER/PROPH/DIAG INJ SC/IM: CPT | Performed by: OBSTETRICS & GYNECOLOGY

## 2020-06-01 RX ORDER — MEDROXYPROGESTERONE ACETATE 150 MG/ML
150 INJECTION, SUSPENSION INTRAMUSCULAR ONCE
Status: COMPLETED | OUTPATIENT
Start: 2020-06-01 | End: 2020-06-01

## 2020-06-01 RX ADMIN — MEDROXYPROGESTERONE ACETATE 150 MG: 150 INJECTION, SUSPENSION INTRAMUSCULAR at 13:30:00
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
## 2020-06-10 ENCOUNTER — TELEPHONE (OUTPATIENT)
Dept: FAMILY MEDICINE CLINIC | Facility: CLINIC | Age: 35
End: 2020-06-10

## 2020-06-11 ENCOUNTER — TELEPHONE (OUTPATIENT)
Dept: FAMILY MEDICINE CLINIC | Facility: CLINIC | Age: 35
End: 2020-06-11

## 2020-06-15 NOTE — TELEPHONE ENCOUNTER
2nd attempt to reach pt, [facility] requesting a return call

## 2020-07-22 ENCOUNTER — APPOINTMENT (OUTPATIENT)
Dept: CT IMAGING | Age: 35
End: 2020-07-22
Attending: EMERGENCY MEDICINE
Payer: MEDICAID

## 2020-07-22 ENCOUNTER — HOSPITAL ENCOUNTER (EMERGENCY)
Age: 35
Discharge: HOME | End: 2020-07-22
Attending: EMERGENCY MEDICINE
Payer: MEDICAID

## 2020-07-22 VITALS
SYSTOLIC BLOOD PRESSURE: 106 MMHG | DIASTOLIC BLOOD PRESSURE: 54 MMHG | OXYGEN SATURATION: 100 % | BODY MASS INDEX: 40.98 KG/M2 | WEIGHT: 222.69 LBS | RESPIRATION RATE: 18 BRPM | TEMPERATURE: 98 F | HEIGHT: 62 IN | HEART RATE: 88 BPM

## 2020-07-22 DIAGNOSIS — N83.201 CYST OF RIGHT OVARY: ICD-10-CM

## 2020-07-22 DIAGNOSIS — R10.9 ABDOMINAL PAIN OF UNKNOWN ETIOLOGY: Primary | ICD-10-CM

## 2020-07-22 LAB
ALBUMIN SERPL-MCNC: 3.8 G/DL (ref 3.4–5)
ALBUMIN/GLOB SERPL: 1 {RATIO} (ref 1–2)
ALP LIVER SERPL-CCNC: 96 U/L (ref 37–98)
ALT SERPL-CCNC: 50 U/L (ref 13–56)
ANION GAP SERPL CALC-SCNC: 5 MMOL/L (ref 0–18)
AST SERPL-CCNC: 24 U/L (ref 15–37)
BASOPHILS # BLD AUTO: 0.06 X10(3) UL (ref 0–0.2)
BASOPHILS NFR BLD AUTO: 0.5 %
BILIRUB SERPL-MCNC: 0.2 MG/DL (ref 0.1–2)
BILIRUB UR QL STRIP.AUTO: NEGATIVE
BUN BLD-MCNC: 15 MG/DL (ref 7–18)
BUN/CREAT SERPL: 16.7 (ref 10–20)
CALCIUM BLD-MCNC: 8.5 MG/DL (ref 8.5–10.1)
CHLORIDE SERPL-SCNC: 106 MMOL/L (ref 98–112)
CLARITY UR REFRACT.AUTO: CLEAR
CO2 SERPL-SCNC: 27 MMOL/L (ref 21–32)
COLOR UR AUTO: YELLOW
CREAT BLD-MCNC: 0.9 MG/DL (ref 0.55–1.02)
DEPRECATED RDW RBC AUTO: 38.3 FL (ref 35.1–46.3)
EOSINOPHIL # BLD AUTO: 0.26 X10(3) UL (ref 0–0.7)
EOSINOPHIL NFR BLD AUTO: 2.1 %
ERYTHROCYTE [DISTWIDTH] IN BLOOD BY AUTOMATED COUNT: 13 % (ref 11–15)
GLOBULIN PLAS-MCNC: 3.7 G/DL (ref 2.8–4.4)
GLUCOSE BLD-MCNC: 80 MG/DL (ref 70–99)
GLUCOSE UR STRIP.AUTO-MCNC: NEGATIVE MG/DL
HCT VFR BLD AUTO: 38.7 % (ref 35–48)
HGB BLD-MCNC: 13.4 G/DL (ref 12–16)
IMM GRANULOCYTES # BLD AUTO: 0.04 X10(3) UL (ref 0–1)
IMM GRANULOCYTES NFR BLD: 0.3 %
KETONES UR STRIP.AUTO-MCNC: NEGATIVE MG/DL
LEUKOCYTE ESTERASE UR QL STRIP.AUTO: NEGATIVE
LIPASE SERPL-CCNC: 86 U/L (ref 73–393)
LYMPHOCYTES # BLD AUTO: 2.89 X10(3) UL (ref 1–4)
LYMPHOCYTES NFR BLD AUTO: 23.3 %
M PROTEIN MFR SERPL ELPH: 7.5 G/DL (ref 6.4–8.2)
MCH RBC QN AUTO: 28.1 PG (ref 26–34)
MCHC RBC AUTO-ENTMCNC: 34.6 G/DL (ref 31–37)
MCV RBC AUTO: 81.1 FL (ref 80–100)
MONOCYTES # BLD AUTO: 0.71 X10(3) UL (ref 0.1–1)
MONOCYTES NFR BLD AUTO: 5.7 %
NEUTROPHILS # BLD AUTO: 8.47 X10 (3) UL (ref 1.5–7.7)
NEUTROPHILS # BLD AUTO: 8.47 X10(3) UL (ref 1.5–7.7)
NEUTROPHILS NFR BLD AUTO: 68.1 %
NITRITE UR QL STRIP.AUTO: NEGATIVE
OSMOLALITY SERPL CALC.SUM OF ELEC: 286 MOSM/KG (ref 275–295)
PH UR STRIP.AUTO: 7 [PH] (ref 4.5–8)
PLATELET # BLD AUTO: 281 10(3)UL (ref 150–450)
POCT URINE PREGNANCY: NEGATIVE
POTASSIUM SERPL-SCNC: 3.6 MMOL/L (ref 3.5–5.1)
PROT UR STRIP.AUTO-MCNC: NEGATIVE MG/DL
RBC # BLD AUTO: 4.77 X10(6)UL (ref 3.8–5.3)
SODIUM SERPL-SCNC: 138 MMOL/L (ref 136–145)
SP GR UR STRIP.AUTO: 1.01 (ref 1–1.03)
UROBILINOGEN UR STRIP.AUTO-MCNC: 0.2 MG/DL
WBC # BLD AUTO: 12.4 X10(3) UL (ref 4–11)

## 2020-07-22 PROCEDURE — 87591 N.GONORRHOEAE DNA AMP PROB: CPT | Performed by: EMERGENCY MEDICINE

## 2020-07-22 PROCEDURE — 87491 CHLMYD TRACH DNA AMP PROBE: CPT | Performed by: EMERGENCY MEDICINE

## 2020-07-22 PROCEDURE — 85025 COMPLETE CBC W/AUTO DIFF WBC: CPT | Performed by: EMERGENCY MEDICINE

## 2020-07-22 PROCEDURE — 80053 COMPREHEN METABOLIC PANEL: CPT | Performed by: EMERGENCY MEDICINE

## 2020-07-22 PROCEDURE — 87660 TRICHOMONAS VAGIN DIR PROBE: CPT | Performed by: EMERGENCY MEDICINE

## 2020-07-22 PROCEDURE — 36415 COLL VENOUS BLD VENIPUNCTURE: CPT

## 2020-07-22 PROCEDURE — 87480 CANDIDA DNA DIR PROBE: CPT | Performed by: EMERGENCY MEDICINE

## 2020-07-22 PROCEDURE — 83690 ASSAY OF LIPASE: CPT | Performed by: EMERGENCY MEDICINE

## 2020-07-22 PROCEDURE — 74177 CT ABD & PELVIS W/CONTRAST: CPT | Performed by: EMERGENCY MEDICINE

## 2020-07-22 PROCEDURE — 87510 GARDNER VAG DNA DIR PROBE: CPT | Performed by: EMERGENCY MEDICINE

## 2020-07-22 PROCEDURE — 81003 URINALYSIS AUTO W/O SCOPE: CPT | Performed by: EMERGENCY MEDICINE

## 2020-07-22 PROCEDURE — 99284 EMERGENCY DEPT VISIT MOD MDM: CPT

## 2020-07-23 LAB
C TRACH DNA SPEC QL NAA+PROBE: NEGATIVE
N GONORRHOEA DNA SPEC QL NAA+PROBE: NEGATIVE

## 2020-07-23 NOTE — ED INITIAL ASSESSMENT (HPI)
Fluttering right side of abd for the past 2.5 mths. Also c/o diarrhea, loose stools -resolved, ha , nausea, spotting periods for the past two wks.

## 2020-07-23 NOTE — ED PROVIDER NOTES
Patient Seen in: [facility] Emergency Department In [place]      History   Patient presents with:  Abdomen/Flank Pain    Stated Complaint: fluttering feeling right sided abd pain, nausea, headache, loose stools -resolv*    HPI    Patient is a 40-year-old noted in HPI. Constitutional and vital signs reviewed. All other systems reviewed and negative except as noted above.     Physical Exam     ED Triage Vitals [07/22/20 2008]   /69   Pulse 96   Resp 18   Temp 97.7 °F (36.5 °C)   Temp src Temporal PANEL (14) - Normal   LIPASE - Normal   POCT PREGNANCY, URINE - Normal   CBC WITH DIFFERENTIAL WITH PLATELET    Narrative: The following orders were created for panel order CBC WITH DIFFERENTIAL WITH PLATELET.   Procedure                               A RETROPERITONEUM:  No mass or adenopathy. BOWEL/MESENTERY:  No visible mass, obstruction, or bowel wall thickening. Normal appendix. No free intraperitoneal air, fluid or inflammatory changes. ABDOMINAL WALL:  No mass or hernia.   URINARY BLADDER:  No vis or persisting symptoms. I discussed the case with the patient and they had no questions, complaints, or concerns. Patient felt comfortable going home.             Disposition and Plan     Clinical Impression:  Abdominal pain of unknown etiology  (primary

## 2020-08-04 ENCOUNTER — HOSPITAL ENCOUNTER (EMERGENCY)
Age: 35
Discharge: HOME | End: 2020-08-04
Attending: EMERGENCY MEDICINE
Payer: MEDICAID

## 2020-08-04 VITALS
OXYGEN SATURATION: 98 % | RESPIRATION RATE: 16 BRPM | WEIGHT: 220 LBS | TEMPERATURE: 98 F | BODY MASS INDEX: 38.98 KG/M2 | DIASTOLIC BLOOD PRESSURE: 49 MMHG | HEIGHT: 62.99 IN | SYSTOLIC BLOOD PRESSURE: 99 MMHG | HEART RATE: 88 BPM

## 2020-08-04 DIAGNOSIS — G89.29 CHRONIC ABDOMINAL PAIN: Primary | ICD-10-CM

## 2020-08-04 DIAGNOSIS — R10.9 CHRONIC ABDOMINAL PAIN: Primary | ICD-10-CM

## 2020-08-04 DIAGNOSIS — N92.1 MENORRHAGIA WITH IRREGULAR CYCLE: ICD-10-CM

## 2020-08-04 LAB
ALBUMIN SERPL-MCNC: 3.7 G/DL (ref 3.4–5)
ALBUMIN/GLOB SERPL: 1.1 {RATIO} (ref 1–2)
ALP LIVER SERPL-CCNC: 103 U/L (ref 37–98)
ALT SERPL-CCNC: 34 U/L (ref 13–56)
ANION GAP SERPL CALC-SCNC: 3 MMOL/L (ref 0–18)
AST SERPL-CCNC: 21 U/L (ref 15–37)
BASOPHILS # BLD AUTO: 0.08 X10(3) UL (ref 0–0.2)
BASOPHILS NFR BLD AUTO: 0.5 %
BILIRUB SERPL-MCNC: 0.2 MG/DL (ref 0.1–2)
BILIRUB UR QL STRIP.AUTO: NEGATIVE
BUN BLD-MCNC: 11 MG/DL (ref 7–18)
BUN/CREAT SERPL: 13.1 (ref 10–20)
CALCIUM BLD-MCNC: 8.5 MG/DL (ref 8.5–10.1)
CHLORIDE SERPL-SCNC: 108 MMOL/L (ref 98–112)
CO2 SERPL-SCNC: 29 MMOL/L (ref 21–32)
COLOR UR AUTO: YELLOW
CREAT BLD-MCNC: 0.84 MG/DL (ref 0.55–1.02)
DEPRECATED RDW RBC AUTO: 37.5 FL (ref 35.1–46.3)
EOSINOPHIL # BLD AUTO: 0.26 X10(3) UL (ref 0–0.7)
EOSINOPHIL NFR BLD AUTO: 1.8 %
ERYTHROCYTE [DISTWIDTH] IN BLOOD BY AUTOMATED COUNT: 12.8 % (ref 11–15)
GLOBULIN PLAS-MCNC: 3.5 G/DL (ref 2.8–4.4)
GLUCOSE BLD-MCNC: 78 MG/DL (ref 70–99)
GLUCOSE UR STRIP.AUTO-MCNC: NEGATIVE MG/DL
HCT VFR BLD AUTO: 39.7 % (ref 35–48)
HGB BLD-MCNC: 13.7 G/DL (ref 12–16)
IMM GRANULOCYTES # BLD AUTO: 0.05 X10(3) UL (ref 0–1)
IMM GRANULOCYTES NFR BLD: 0.3 %
KETONES UR STRIP.AUTO-MCNC: NEGATIVE MG/DL
LEUKOCYTE ESTERASE UR QL STRIP.AUTO: NEGATIVE
LIPASE SERPL-CCNC: 75 U/L (ref 73–393)
LYMPHOCYTES # BLD AUTO: 2.59 X10(3) UL (ref 1–4)
LYMPHOCYTES NFR BLD AUTO: 17.8 %
M PROTEIN MFR SERPL ELPH: 7.2 G/DL (ref 6.4–8.2)
MCH RBC QN AUTO: 28 PG (ref 26–34)
MCHC RBC AUTO-ENTMCNC: 34.5 G/DL (ref 31–37)
MCV RBC AUTO: 81 FL (ref 80–100)
MONOCYTES # BLD AUTO: 0.68 X10(3) UL (ref 0.1–1)
MONOCYTES NFR BLD AUTO: 4.7 %
NEUTROPHILS # BLD AUTO: 10.91 X10 (3) UL (ref 1.5–7.7)
NEUTROPHILS # BLD AUTO: 10.91 X10(3) UL (ref 1.5–7.7)
NEUTROPHILS NFR BLD AUTO: 74.9 %
NITRITE UR QL STRIP.AUTO: NEGATIVE
OSMOLALITY SERPL CALC.SUM OF ELEC: 288 MOSM/KG (ref 275–295)
PH UR STRIP.AUTO: 7.5 [PH] (ref 4.5–8)
PLATELET # BLD AUTO: 281 10(3)UL (ref 150–450)
POCT LOT NUMBER: NORMAL
POCT URINE PREGNANCY: NEGATIVE
POTASSIUM SERPL-SCNC: 3.6 MMOL/L (ref 3.5–5.1)
PROT UR STRIP.AUTO-MCNC: NEGATIVE MG/DL
RBC # BLD AUTO: 4.9 X10(6)UL (ref 3.8–5.3)
SODIUM SERPL-SCNC: 140 MMOL/L (ref 136–145)
SP GR UR STRIP.AUTO: 1.02 (ref 1–1.03)
UROBILINOGEN UR STRIP.AUTO-MCNC: 0.2 MG/DL
WBC # BLD AUTO: 14.6 X10(3) UL (ref 4–11)

## 2020-08-04 PROCEDURE — 81025 URINE PREGNANCY TEST: CPT

## 2020-08-04 PROCEDURE — 99284 EMERGENCY DEPT VISIT MOD MDM: CPT

## 2020-08-04 PROCEDURE — 85025 COMPLETE CBC W/AUTO DIFF WBC: CPT | Performed by: EMERGENCY MEDICINE

## 2020-08-04 PROCEDURE — 96374 THER/PROPH/DIAG INJ IV PUSH: CPT

## 2020-08-04 PROCEDURE — 80053 COMPREHEN METABOLIC PANEL: CPT | Performed by: EMERGENCY MEDICINE

## 2020-08-04 PROCEDURE — 81001 URINALYSIS AUTO W/SCOPE: CPT | Performed by: EMERGENCY MEDICINE

## 2020-08-04 PROCEDURE — 83690 ASSAY OF LIPASE: CPT | Performed by: EMERGENCY MEDICINE

## 2020-08-04 RX ORDER — KETOROLAC TROMETHAMINE 30 MG/ML
30 INJECTION, SOLUTION INTRAMUSCULAR; INTRAVENOUS ONCE
Status: COMPLETED | OUTPATIENT
Start: 2020-08-04 | End: 2020-08-04

## 2020-08-05 NOTE — ED PROVIDER NOTES
Patient Seen in: THE [facility] Emergency Department In [place]      History   Patient presents with:  Abdomen/Flank Pain    Stated Complaint: abdominal pain    HPI    Patient was to the emergency department July 22. She was complaining of several issues.   Eulogio Prophet REMOVAL GALLBLADDER     • TONSILLECTOMY      2002                    Social History    Tobacco Use      Smoking status: Current Every Day Smoker        Packs/day: 1.00        Types: Cigarettes      Smokeless tobacco: Never Used    Alcohol use:  Yes      Alc 103 (*)     All other components within normal limits   URINALYSIS WITH CULTURE REFLEX - Abnormal; Notable for the following components:    Clarity Urine Cloudy (*)     Blood Urine Large (*)     All other components within normal limits   URINE MICROSCOPIC PELVIC NODES:  No adenopathy. PELVIC ORGANS:  There is a low-density, nonenhancing left ovarian cyst suggested measuring 2.6 x 1.9 cm. This is most likely representing a dominant follicle. No free fluid or inflammatory changes of the pelvis.   Normal discomforts  I recommend reassessment by her gynecologist for the ongoing vaginal bleeding and discussed with her that this may require further investigation at able to be completed here in the emergency department  Patient felt comfortable in being discha

## 2020-08-05 NOTE — ED INITIAL ASSESSMENT (HPI)
PT presented to ER c/o abdominal pain for the past 1 1/2 weeks. Pt was seen here for the same problem 1 1/2 week ago. C/o nausea.  and vaginal bleeding

## 2020-08-06 ENCOUNTER — TELEPHONE (OUTPATIENT)
Dept: OBGYN CLINIC | Facility: CLINIC | Age: 35
End: 2020-08-06

## 2020-08-06 ENCOUNTER — OFFICE VISIT (OUTPATIENT)
Dept: OBGYN CLINIC | Facility: CLINIC | Age: 35
End: 2020-08-06
Payer: MEDICAID

## 2020-08-06 VITALS
BODY MASS INDEX: 41.22 KG/M2 | DIASTOLIC BLOOD PRESSURE: 84 MMHG | WEIGHT: 224 LBS | TEMPERATURE: 98 F | SYSTOLIC BLOOD PRESSURE: 130 MMHG | HEIGHT: 62 IN

## 2020-08-06 DIAGNOSIS — R10.2 PELVIC PAIN: Primary | ICD-10-CM

## 2020-08-06 PROCEDURE — 3079F DIAST BP 80-89 MM HG: CPT | Performed by: NURSE PRACTITIONER

## 2020-08-06 PROCEDURE — 99213 OFFICE O/P EST LOW 20 MIN: CPT | Performed by: NURSE PRACTITIONER

## 2020-08-06 PROCEDURE — 3008F BODY MASS INDEX DOCD: CPT | Performed by: NURSE PRACTITIONER

## 2020-08-06 PROCEDURE — 3075F SYST BP GE 130 - 139MM HG: CPT | Performed by: NURSE PRACTITIONER

## 2020-08-06 NOTE — PROGRESS NOTES
Gyne note     S: patient is a 38year old yo [id number] here for a recent onset of abdominal, right side, and pelvic pain/ cramps. She was seen in the ER 7/22/2020 and 8/4/2020. She had a negative urine culture and a CT done. Her CT showed:  1.  No acute intra could consider giving her Depo Provera injection early if her bleeding persists however I did reassure her that breakthrough bleeding is normal in the first year of Depo.

## 2020-08-06 NOTE — TELEPHONE ENCOUNTER
Spoke with [employer]al[employer] Denton. Yovani Sykes sent staff message to see if Depo would be covered 1 week early. Per Eliud martínez, [phone] can be billed 4x a year. Will route to [facility] to schedule appointment for Monday.

## 2020-08-07 NOTE — TELEPHONE ENCOUNTER
Spoke with patient. Let her know that insurance will auth for 4 administrations a year, as noted below. Assisted with scheduling injection.

## 2020-08-10 ENCOUNTER — NURSE ONLY (OUTPATIENT)
Dept: OBGYN CLINIC | Facility: CLINIC | Age: 35
End: 2020-08-10
Payer: MEDICAID

## 2020-08-10 ENCOUNTER — OFFICE VISIT (OUTPATIENT)
Dept: FAMILY MEDICINE CLINIC | Facility: CLINIC | Age: 35
End: 2020-08-10
Payer: MEDICAID

## 2020-08-10 VITALS — BODY MASS INDEX: 40 KG/M2 | WEIGHT: 220 LBS | SYSTOLIC BLOOD PRESSURE: 90 MMHG | DIASTOLIC BLOOD PRESSURE: 60 MMHG

## 2020-08-10 VITALS
HEIGHT: 62 IN | BODY MASS INDEX: 40.52 KG/M2 | WEIGHT: 220.19 LBS | RESPIRATION RATE: 16 BRPM | SYSTOLIC BLOOD PRESSURE: 110 MMHG | TEMPERATURE: 98 F | DIASTOLIC BLOOD PRESSURE: 70 MMHG | HEART RATE: 90 BPM | OXYGEN SATURATION: 99 %

## 2020-08-10 DIAGNOSIS — Z30.49 ENCOUNTER FOR SURVEILLANCE OF OTHER CONTRACEPTIVE: Primary | ICD-10-CM

## 2020-08-10 DIAGNOSIS — J30.9 ALLERGIC RHINITIS, UNSPECIFIED SEASONALITY, UNSPECIFIED TRIGGER: ICD-10-CM

## 2020-08-10 DIAGNOSIS — R10.10 PAIN OF UPPER ABDOMEN: Primary | ICD-10-CM

## 2020-08-10 DIAGNOSIS — J01.00 ACUTE NON-RECURRENT MAXILLARY SINUSITIS: ICD-10-CM

## 2020-08-10 PROCEDURE — 3008F BODY MASS INDEX DOCD: CPT | Performed by: FAMILY MEDICINE

## 2020-08-10 PROCEDURE — 3078F DIAST BP <80 MM HG: CPT | Performed by: OBSTETRICS & GYNECOLOGY

## 2020-08-10 PROCEDURE — 99214 OFFICE O/P EST MOD 30 MIN: CPT | Performed by: FAMILY MEDICINE

## 2020-08-10 PROCEDURE — 96372 THER/PROPH/DIAG INJ SC/IM: CPT | Performed by: OBSTETRICS & GYNECOLOGY

## 2020-08-10 PROCEDURE — 3074F SYST BP LT 130 MM HG: CPT | Performed by: OBSTETRICS & GYNECOLOGY

## 2020-08-10 PROCEDURE — 3074F SYST BP LT 130 MM HG: CPT | Performed by: FAMILY MEDICINE

## 2020-08-10 PROCEDURE — 3078F DIAST BP <80 MM HG: CPT | Performed by: FAMILY MEDICINE

## 2020-08-10 RX ORDER — MEDROXYPROGESTERONE ACETATE 150 MG/ML
150 INJECTION, SUSPENSION INTRAMUSCULAR ONCE
Status: COMPLETED | OUTPATIENT
Start: 2020-08-10 | End: 2020-08-10

## 2020-08-10 RX ADMIN — MEDROXYPROGESTERONE ACETATE 150 MG: 150 INJECTION, SUSPENSION INTRAMUSCULAR at 12:07:00

## 2020-08-10 NOTE — PROGRESS NOTES
Patient presents to office today for Depo provera injection. Last injection administered on: 6/1/20- ok for early injection per Josh Malhotra.      Last OV: 8/6/20  BP: 130/84  Wt: 224 lbs    Today:  BP: 90/60  Wt: 220 lbs in family practice office today      Doug

## 2020-08-10 NOTE — PROGRESS NOTES
HPI:   Jean-Pierre Wakefield is a 38year old female that presents for Patient presents with:  Cramps: Pelvic cramping. Has followed up with Mick Madsen . Was seen in ER 7/22/20 and 8/4/20. Has abd concerns.  Has us pelvic ordered by GYN   Medicatio 16   Ht 62\"   Wt 220 lb 3.2 oz (99.9 kg)   LMP 07/20/2020   SpO2 99%   BMI 40.28 kg/m²  Estimated body mass index is 40.28 kg/m² as calculated from the following:    Height as of this encounter: 62\".     Weight as of this encounter: 220 lb 3.2 oz (99.9 kg file.    Santiago Marino M.D.   [facility] Medicine   8/10/2020  11:19 AM

## 2020-08-11 RX ORDER — CIPROFLOXACIN 500 MG/1
500 TABLET, FILM COATED ORAL 2 TIMES DAILY
Qty: 14 TABLET | Refills: 0 | Status: SHIPPED | OUTPATIENT
Start: 2020-08-11 | End: 2020-08-18

## 2020-09-02 DIAGNOSIS — G43.701 CHRONIC MIGRAINE WITHOUT AURA WITH STATUS MIGRAINOSUS, NOT INTRACTABLE: ICD-10-CM

## 2020-09-02 NOTE — TELEPHONE ENCOUNTER
Requesting Butabital  LOV: 8/10/2020  RTC: prn  Last Relevant Labs:   Filled: 3/16/2020 #40 with 0 refills    Future Appointments   Date Time Provider Harriet Bell   10/30/2020 11:15 AM [facility] OB PFLD NURSE [facility] OB[facility] P [facility] [address]     Per [place] , last

## 2020-09-02 NOTE — TELEPHONE ENCOUNTER
Butalbital-APAP-Caffeine -40 MG Oral Cap     [facility] [address] [place] ([place]), [place] - [address], [phone]

## 2020-09-03 RX ORDER — BUTALBITAL, ACETAMINOPHEN AND CAFFEINE 300; 40; 50 MG/1; MG/1; MG/1
1 CAPSULE ORAL EVERY 6 HOURS PRN
Qty: 40 CAPSULE | Refills: 0 | Status: SHIPPED | OUTPATIENT
Start: 2020-09-03 | End: 2020-11-23

## 2020-09-28 ENCOUNTER — VIRTUAL PHONE E/M (OUTPATIENT)
Dept: FAMILY MEDICINE CLINIC | Facility: CLINIC | Age: 35
End: 2020-09-28
Payer: MEDICAID

## 2020-09-28 DIAGNOSIS — J01.00 ACUTE NON-RECURRENT MAXILLARY SINUSITIS: Primary | ICD-10-CM

## 2020-09-28 PROCEDURE — 99213 OFFICE O/P EST LOW 20 MIN: CPT | Performed by: FAMILY MEDICINE

## 2020-09-28 RX ORDER — LEVOFLOXACIN 500 MG/1
500 TABLET, FILM COATED ORAL DAILY
Qty: 10 TABLET | Refills: 0 | Status: SHIPPED | OUTPATIENT
Start: 2020-09-28 | End: 2020-10-08

## 2020-09-28 NOTE — PROGRESS NOTES
TELEMEDICINE VISIT by phone  This visit is conducted using Telemedicine with live, interactive audio.      Verification of patient identity was established by the  patient (s)  Mindy Souza verbally consents to a tel maxillary sinusitis    Levaquin Tylenol fluids rest.  Risks, benefits, and alternatives of current treatment plan discussed in detail. Questions and concerns addressed. Red flags to RTC or ED reviewed. Patient (or parent) agrees to plan.       No follow-u

## 2020-10-29 DIAGNOSIS — Z30.42 ENCOUNTER FOR SURVEILLANCE OF INJECTABLE CONTRACEPTIVE: Primary | ICD-10-CM

## 2020-10-29 RX ORDER — MEDROXYPROGESTERONE ACETATE 150 MG/ML
150 INJECTION, SUSPENSION INTRAMUSCULAR
Status: DISCONTINUED | OUTPATIENT
Start: 2020-10-29 | End: 2020-12-30

## 2020-10-30 ENCOUNTER — NURSE ONLY (OUTPATIENT)
Dept: OBGYN CLINIC | Facility: CLINIC | Age: 35
End: 2020-10-30
Payer: MEDICAID

## 2020-10-30 VITALS
WEIGHT: 220 LBS | BODY MASS INDEX: 40 KG/M2 | DIASTOLIC BLOOD PRESSURE: 70 MMHG | TEMPERATURE: 99 F | SYSTOLIC BLOOD PRESSURE: 118 MMHG

## 2020-10-30 PROCEDURE — 3078F DIAST BP <80 MM HG: CPT | Performed by: OBSTETRICS & GYNECOLOGY

## 2020-10-30 PROCEDURE — 3074F SYST BP LT 130 MM HG: CPT | Performed by: OBSTETRICS & GYNECOLOGY

## 2020-10-30 PROCEDURE — 96372 THER/PROPH/DIAG INJ SC/IM: CPT | Performed by: OBSTETRICS & GYNECOLOGY

## 2020-10-30 RX ADMIN — MEDROXYPROGESTERONE ACETATE 150 MG: 150 INJECTION, SUSPENSION INTRAMUSCULAR at 11:30:00

## 2020-10-30 NOTE — PROGRESS NOTES
Patient presents to office today for Depo provera injection.   Last injection administered on: 8/10/20    Last OV: 8/10/20  BP: 90/60  Wt: 220 lbs    Today:  BP: 118/70  Wt: 220 lbs    Patient name, date of birth, and medication name, dose and route verifie

## 2020-11-04 ENCOUNTER — OFFICE VISIT (OUTPATIENT)
Dept: FAMILY MEDICINE CLINIC | Facility: CLINIC | Age: 35
End: 2020-11-04
Payer: MEDICAID

## 2020-11-04 VITALS
SYSTOLIC BLOOD PRESSURE: 112 MMHG | HEIGHT: 62 IN | BODY MASS INDEX: 39.78 KG/M2 | DIASTOLIC BLOOD PRESSURE: 80 MMHG | HEART RATE: 75 BPM | WEIGHT: 216.19 LBS | TEMPERATURE: 98 F | OXYGEN SATURATION: 98 % | RESPIRATION RATE: 16 BRPM
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
## 2020-11-04 DIAGNOSIS — S61.451A DOG BITE, HAND, RIGHT, INITIAL ENCOUNTER: Primary | ICD-10-CM

## 2020-11-04 PROCEDURE — 99213 OFFICE O/P EST LOW 20 MIN: CPT | Performed by: FAMILY MEDICINE

## 2020-11-04 PROCEDURE — 3008F BODY MASS INDEX DOCD: CPT | Performed by: FAMILY MEDICINE

## 2020-11-04 PROCEDURE — 3079F DIAST BP 80-89 MM HG: CPT | Performed by: FAMILY MEDICINE

## 2020-11-04 PROCEDURE — 3074F SYST BP LT 130 MM HG: CPT | Performed by: FAMILY MEDICINE

## 2020-11-04 RX ORDER — FLUTICASONE PROPIONATE 50 MCG
2 SPRAY, SUSPENSION (ML) NASAL DAILY
COMMUNITY
Start: 2020-11-01 | End: 2020-11-16

## 2020-11-04 RX ORDER — AMOXICILLIN AND CLAVULANATE POTASSIUM 875; 125 MG/1; MG/1
TABLET, FILM COATED ORAL
COMMUNITY
End: 2020-12-30

## 2020-11-04 NOTE — PROGRESS NOTES
HPI:   Alley Darnell is a 38year old female that presents for Patient presents with:  ER F/U: Dog bite right hand. Sever pain 10/10 finger and hand swelling. Was seen @ [facility] yesterday. Provided tdap shot in ER.  Currently traking Augmentin due to a moderate swelling noted of the hand moderately painful to palpation puncture wound noted on the dorsum of the hand approximately 1 cm wide, no active bleeding or drainage superficial scrapes noted on the hand and on the thumb area as well.   EXTREMITIES:  N

## 2020-11-05 ENCOUNTER — TELEPHONE (OUTPATIENT)
Dept: FAMILY MEDICINE CLINIC | Facility: CLINIC | Age: 35
End: 2020-11-05

## 2020-11-05 RX ORDER — HYDROCODONE BITARTRATE AND ACETAMINOPHEN 7.5; 325 MG/1; MG/1
1 TABLET ORAL 2 TIMES DAILY PRN
Qty: 14 TABLET | Refills: 0 | Status: SHIPPED | OUTPATIENT
Start: 2020-11-05 | End: 2021-06-02

## 2020-11-05 NOTE — TELEPHONE ENCOUNTER
Pt called back to check status of prescription. Pt stated that this was supposed to be done yesterday and she is the one sitting there in pain. Informed patient that I would reach our to physician in regards to this-pt hung up on me.

## 2020-11-05 NOTE — TELEPHONE ENCOUNTER
Patient calling, patient came in for visit yesterday for dog bite. Doctor mentioned sending Orren Rides or pain killer to pharmacy. Pharmacy did not get any prescription.

## 2020-11-09 ENCOUNTER — TELEPHONE (OUTPATIENT)
Dept: FAMILY MEDICINE CLINIC | Facility: CLINIC | Age: 35
End: 2020-11-09

## 2020-11-09 NOTE — TELEPHONE ENCOUNTER
LM that Dr Jaron Denson can do a virtual visit with her today at 2;15pm. Asked that she call us back.

## 2020-11-09 NOTE — TELEPHONE ENCOUNTER
Patient states, \"is anyone going to call her back? \" she has questions regarding her son who is in school, she is positive for Covid. While holding to check with the nurse, she hung up.

## 2020-11-09 NOTE — TELEPHONE ENCOUNTER
See phone message below:    Appt has to be video visit due to pt testing positive for Covid-19 on 11/1/2020. Do you want to squeeze her in anywhere for a video visit? Thanks!

## 2020-11-09 NOTE — TELEPHONE ENCOUNTER
Future Appointments   Date Time Provider Harriet Arabella   11/9/2020  3:15 PM Althea Hendrix MD EM[facility] [address] [facility] [address]   1/27/2021 11:00 AM EM[facility] OB PFLD NURSE [facility] OB/GYN P [facility] [address]

## 2020-11-11 ENCOUNTER — TELEPHONE (OUTPATIENT)
Dept: FAMILY MEDICINE CLINIC | Facility: CLINIC | Age: 35
End: 2020-11-11

## 2020-11-11 NOTE — TELEPHONE ENCOUNTER
Patient states her phone is jacked up, she didn't know she had an appt Monday, did she miss anything else? Please advise.

## 2020-11-12 NOTE — TELEPHONE ENCOUNTER
Future Appointments   Date Time Provider Harriet Arabella   11/13/2020 11:45 AM Elizabeth Aguirre MD [facility] [address] [facility] [address]   1/27/2021 11:00 AM EM[facility] OB PFLD NURSE [facility] OB[facility]GYN P [facility] [address]

## 2020-11-13 ENCOUNTER — TELEMEDICINE (OUTPATIENT)
Dept: FAMILY MEDICINE CLINIC | Facility: CLINIC | Age: 35
End: 2020-11-13
Payer: MEDICAID

## 2020-11-13 DIAGNOSIS — S61.451D DOG BITE, HAND, RIGHT, SUBSEQUENT ENCOUNTER: Primary | ICD-10-CM
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
## 2020-11-13 PROCEDURE — 99213 OFFICE O/P EST LOW 20 MIN: CPT | Performed by: FAMILY MEDICINE

## 2020-11-13 NOTE — PROGRESS NOTES
TELEMEDICINE VISIT by phone  This visit is conducted using Telemedicine with live, interactive video    Verification of patient identity was established by the  patient (s)  Mindy Souza verbally consents to a telem tablet every 12 hours by oral route for 10 days. , Disp: , Rfl:   •  Fluticasone Propionate 50 MCG/ACT Nasal Suspension, 2 sprays by Nasal route daily. , Disp: , Rfl:   •  FLUOXETINE HCL 40 MG Oral Cap, Take 1 capsule by mouth once daily, Disp: 30 capsule, R public health crisis/national emergency and because of restrictions of visitation. There are limitations of this visit as no physical exam could be performed. Every conscious effort was taken to allow for sufficient and adequate time.   This billing was s

## 2020-11-16 DIAGNOSIS — G43.701 CHRONIC MIGRAINE WITHOUT AURA WITH STATUS MIGRAINOSUS, NOT INTRACTABLE: ICD-10-CM

## 2020-11-16 RX ORDER — FLUTICASONE PROPIONATE 50 MCG
SPRAY, SUSPENSION (ML) NASAL
Qty: 1 BOTTLE | Refills: 0 | Status: SHIPPED | OUTPATIENT
Start: 2020-11-16 | End: 2021-06-02

## 2020-11-16 NOTE — TELEPHONE ENCOUNTER
Inform patient will send diclofenac but there is some interaction between diclofenac and fluoxetine and both together can increase the risk of GI bleeding.   Please watch for any signs of GI bleeding including dark stool or blood in the stool or vomiting of

## 2020-11-16 NOTE — TELEPHONE ENCOUNTER
See message below:    Rx for Flonase pended. Can you prescribe something for her headaches or do you want her to make a VV? Thanks!

## 2020-11-16 NOTE — TELEPHONE ENCOUNTER
Patient calling, will like refill on Flonase. Also asking for medication for headaches, has been off of pain meds for last several days, please advise.

## 2020-11-17 RX ORDER — BUTALBITAL, ACETAMINOPHEN AND CAFFEINE 300; 40; 50 MG/1; MG/1; MG/1
1 CAPSULE ORAL EVERY 6 HOURS PRN
Qty: 40 CAPSULE | Refills: 0 | OUTPATIENT
Start: 2020-11-17 | End: 2020-12-01

## 2020-11-17 NOTE — TELEPHONE ENCOUNTER
Spoke with pt, informed Rx for Diclofenac and Flonase sent to pharmacy. Advised risk of GI bleed with Diclofenac and Fluoxetine and symptoms to monitor for.  Pt states she would rather have Rx for Butabital then Diclofenac, states she has taken Diclofenac i

## 2020-11-18 NOTE — TELEPHONE ENCOUNTER
I spoke with patient yesterday, she did not  the Diclofenac. She would rather have Butabital, states Diclofenac has not worked for her in the past for her headaches.

## 2020-11-18 NOTE — TELEPHONE ENCOUNTER
Patient called back and stated that she does not take the prescription nasal spray as it is not covered by her insurance, and that she is not supposed to take the Diclofenac. Patient calling to find out why the headache medication has been denied.  She stat

## 2020-11-20 NOTE — TELEPHONE ENCOUNTER
Patient is calling back requesting that Butabital be sent to the pharmacy. She did not  Flonase because it was too expensive. She did NOT  the Diclofenac because she has had it in the past and it didn't help.     Please advise, Rx pended for B

## 2020-11-23 RX ORDER — BUTALBITAL, ACETAMINOPHEN AND CAFFEINE 300; 40; 50 MG/1; MG/1; MG/1
1 CAPSULE ORAL EVERY 6 HOURS PRN
Qty: 40 CAPSULE | Refills: 0 | Status: SHIPPED | OUTPATIENT
Start: 2020-11-23 | End: 2021-02-23

## 2020-11-23 NOTE — TELEPHONE ENCOUNTER
Pt does NOT have Diclofenac. She would only be taking Butabital. She is not picking up the Diclofenac that was sent to the pharmacy, she wants Butabital instead.

## 2020-12-01 ENCOUNTER — TELEPHONE (OUTPATIENT)
Dept: FAMILY MEDICINE CLINIC | Facility: CLINIC | Age: 35
End: 2020-12-01

## 2020-12-01 NOTE — TELEPHONE ENCOUNTER
Patient states she received a letter from the hospital in re: Covid screening questions, appts in the office and the timing of events. After speaking with the Clin Super, I referred her to [facility]ocate at [phone].

## 2020-12-30 ENCOUNTER — OFFICE VISIT (OUTPATIENT)
Dept: FAMILY MEDICINE CLINIC | Facility: CLINIC | Age: 35
End: 2020-12-30
Payer: MEDICAID

## 2020-12-30 VITALS
DIASTOLIC BLOOD PRESSURE: 70 MMHG | HEIGHT: 62 IN | TEMPERATURE: 98 F | BODY MASS INDEX: 40.53 KG/M2 | HEART RATE: 82 BPM | SYSTOLIC BLOOD PRESSURE: 118 MMHG | WEIGHT: 220.25 LBS | RESPIRATION RATE: 16 BRPM

## 2020-12-30 DIAGNOSIS — H66.90 ACUTE OTITIS MEDIA, UNSPECIFIED OTITIS MEDIA TYPE: ICD-10-CM

## 2020-12-30 DIAGNOSIS — R42 DIZZINESS: Primary | ICD-10-CM

## 2020-12-30 PROCEDURE — 3074F SYST BP LT 130 MM HG: CPT | Performed by: FAMILY MEDICINE

## 2020-12-30 PROCEDURE — 3008F BODY MASS INDEX DOCD: CPT | Performed by: FAMILY MEDICINE

## 2020-12-30 PROCEDURE — 99214 OFFICE O/P EST MOD 30 MIN: CPT | Performed by: FAMILY MEDICINE

## 2020-12-30 PROCEDURE — 3078F DIAST BP <80 MM HG: CPT | Performed by: FAMILY MEDICINE

## 2020-12-30 RX ORDER — CEFDINIR 300 MG/1
300 CAPSULE ORAL 2 TIMES DAILY
Qty: 14 CAPSULE | Refills: 0 | Status: SHIPPED | OUTPATIENT
Start: 2020-12-30 | End: 2021-01-06

## 2020-12-31 PROBLEM — H66.90 ACUTE OTITIS MEDIA: Status: ACTIVE | Noted: 2020-02-12

## 2020-12-31 NOTE — PROGRESS NOTES
HPI:   Mauricio Reese is a 38year old female that presents for complaint of having dizziness off-and-on over the past several weeks or more.   She had seen urgent care and had been prescribed antibiotics for left ear infection as she had piercing pa mass index is 40.28 kg/m² as calculated from the following:    Height as of this encounter: 5' 2\" (1.575 m). Weight as of this encounter: 220 lb 4 oz (99.9 kg). Vital signs reviewed. Appears stated age, well groomed.   Physical Exam:  GEN:  Patient is

## 2021-01-04 ENCOUNTER — TELEPHONE (OUTPATIENT)
Dept: FAMILY MEDICINE CLINIC | Facility: CLINIC | Age: 36
End: 2021-01-04

## 2021-01-04 RX ORDER — FLUCONAZOLE 150 MG/1
150 TABLET ORAL ONCE
Qty: 1 TABLET | Refills: 0 | Status: SHIPPED | OUTPATIENT
Start: 2021-01-04 | End: 2021-01-04

## 2021-01-04 NOTE — TELEPHONE ENCOUNTER
Pt states she has developed a yeast infection from the recent medication given from pcp and may need something prescribed. She tried over the counter stuff but is not working.

## 2021-01-04 NOTE — TELEPHONE ENCOUNTER
See phone message below:    Pt given Cefdinir 300mg on 12/30/2020, now experiencing yeast infection. Ok to send fluconazole?

## 2021-01-05 NOTE — TELEPHONE ENCOUNTER
Pt called back and stated that she had still not heard from the nurse or the doctor in regards to her condition. She decided to go a different route through a family member.  Offered to reach out to provider to verify that medication had been sent and to ca

## 2021-01-13 ENCOUNTER — OFFICE VISIT (OUTPATIENT)
Dept: FAMILY MEDICINE CLINIC | Facility: CLINIC | Age: 36
End: 2021-01-13
Payer: MEDICAID

## 2021-01-13 VITALS
SYSTOLIC BLOOD PRESSURE: 110 MMHG | TEMPERATURE: 98 F | BODY MASS INDEX: 40 KG/M2 | WEIGHT: 221 LBS | RESPIRATION RATE: 16 BRPM | HEART RATE: 60 BPM | DIASTOLIC BLOOD PRESSURE: 62 MMHG

## 2021-01-13 DIAGNOSIS — H66.92 LEFT OTITIS MEDIA, UNSPECIFIED OTITIS MEDIA TYPE: ICD-10-CM

## 2021-01-13 DIAGNOSIS — R35.0 URINARY FREQUENCY: ICD-10-CM

## 2021-01-13 DIAGNOSIS — R10.9 FLANK PAIN: Primary | ICD-10-CM

## 2021-01-13 LAB
BILIRUBIN URINE: NEGATIVE
BLOOD URINE: NEGATIVE
CONTROL RUN WITHIN 24 HOURS?: YES
GLUCOSE URINE: NEGATIVE
KETONE URINE: NEGATIVE
LEUKOCYTE ESTERASE URINE: NEGATIVE
NITRITE URINE: NEGATIVE
PH URINE: 6.5 (ref 5–8)
PROTEIN URINE: NEGATIVE
SPEC GRAVITY: 1.02 (ref 1–1.03)
URINE CLARITY: CLEAR
URINE COLOR: YELLOW
UROBILINOGEN URINE: 0.2

## 2021-01-13 PROCEDURE — 3074F SYST BP LT 130 MM HG: CPT | Performed by: FAMILY MEDICINE

## 2021-01-13 PROCEDURE — 87086 URINE CULTURE/COLONY COUNT: CPT | Performed by: FAMILY MEDICINE

## 2021-01-13 PROCEDURE — 99214 OFFICE O/P EST MOD 30 MIN: CPT | Performed by: FAMILY MEDICINE

## 2021-01-13 PROCEDURE — 3078F DIAST BP <80 MM HG: CPT | Performed by: FAMILY MEDICINE

## 2021-01-13 RX ORDER — FLUTICASONE PROPIONATE 50 MCG
SPRAY, SUSPENSION (ML) NASAL
Qty: 1 BOTTLE | Refills: 0 | Status: SHIPPED | OUTPATIENT
Start: 2021-01-13 | End: 2021-06-02

## 2021-01-15 PROBLEM — H66.92 LEFT OTITIS MEDIA: Status: ACTIVE | Noted: 2021-01-15

## 2021-01-15 NOTE — PROGRESS NOTES
HPI:   Katarzyna Lawrence is a 38year old female that presents for Patient presents with: Follow - Up: F/U Left acute natalie media, prescribed abx-finsihed course. Still c/o left ear with slight right ear pain. No tubes. No redness, swelling, drainage. °F (36.8 °C) (Oral)   Resp 16   Wt 221 lb (100.2 kg)   LMP 10/19/2020   BMI 40.42 kg/m²  Estimated body mass index is 40.42 kg/m² as calculated from the following:    Height as of 12/30/20: 5' 2\" (1.575 m).     Weight as of this encounter: 221 lb (100.2 kg on file. Brittanie Joe M.D.   Family Medicine   1/15/2021  2:57 PM

## 2021-01-26 DIAGNOSIS — Z30.49 ENCOUNTER FOR SURVEILLANCE OF OTHER CONTRACEPTIVE: Primary | ICD-10-CM

## 2021-01-26 RX ORDER — MEDROXYPROGESTERONE ACETATE 150 MG/ML
150 INJECTION, SUSPENSION INTRAMUSCULAR
Status: COMPLETED | OUTPATIENT
Start: 2021-01-27 | End: 2021-04-26

## 2021-01-26 NOTE — PROGRESS NOTES
Encounter opened to reorder standing depo order that had been inadvertently discontinued by another department.

## 2021-01-27 ENCOUNTER — NURSE ONLY (OUTPATIENT)
Dept: OBGYN CLINIC | Facility: CLINIC | Age: 36
End: 2021-01-27
Payer: MEDICAID

## 2021-01-27 VITALS — DIASTOLIC BLOOD PRESSURE: 60 MMHG | SYSTOLIC BLOOD PRESSURE: 100 MMHG | BODY MASS INDEX: 41 KG/M2 | WEIGHT: 224 LBS

## 2021-01-27 PROCEDURE — 3074F SYST BP LT 130 MM HG: CPT | Performed by: OBSTETRICS & GYNECOLOGY

## 2021-01-27 PROCEDURE — 96372 THER/PROPH/DIAG INJ SC/IM: CPT | Performed by: OBSTETRICS & GYNECOLOGY

## 2021-01-27 PROCEDURE — 3078F DIAST BP <80 MM HG: CPT | Performed by: OBSTETRICS & GYNECOLOGY

## 2021-01-27 RX ADMIN — MEDROXYPROGESTERONE ACETATE 150 MG: 150 INJECTION, SUSPENSION INTRAMUSCULAR at 11:05:00

## 2021-01-27 NOTE — PROGRESS NOTES
Patient presents to office today for Depo provera injection.   Last injection administered on: 10/30/20    Last OV: 10/30/20  BP: 118/70  Wt: 220 lbs    Today:  BP: 100/60  Wt: 224 lbs    Patient name, date of birth, and medication name, dose and route veri

## 2021-01-29 ENCOUNTER — TELEMEDICINE (OUTPATIENT)
Dept: FAMILY MEDICINE CLINIC | Facility: CLINIC | Age: 36
End: 2021-01-29

## 2021-01-29 DIAGNOSIS — J01.90 ACUTE NON-RECURRENT SINUSITIS, UNSPECIFIED LOCATION: Primary | ICD-10-CM

## 2021-01-29 DIAGNOSIS — J30.2 SEASONAL ALLERGIES: ICD-10-CM

## 2021-01-29 PROCEDURE — 99213 OFFICE O/P EST LOW 20 MIN: CPT | Performed by: FAMILY MEDICINE

## 2021-01-29 RX ORDER — METHYLPREDNISOLONE 4 MG/1
TABLET ORAL
Qty: 1 KIT | Refills: 0 | Status: SHIPPED | OUTPATIENT
Start: 2021-01-29 | End: 2021-06-02 | Stop reason: ALTCHOICE

## 2021-01-29 RX ORDER — MONTELUKAST SODIUM 10 MG/1
10 TABLET ORAL NIGHTLY
Qty: 90 TABLET | Refills: 0 | Status: SHIPPED | OUTPATIENT
Start: 2021-01-29 | End: 2021-06-02

## 2021-01-29 NOTE — PROGRESS NOTES
Video Visit    Mindy Souza verbally consents to a Video Visit service on 01/29/21. Patient understands and accepts financial responsibility for any deductible, co-insurance and/or co-pays associated with this service.     Duration of the servic each nostril qhs, Disp: 1 Bottle, Rfl: 0  •  Diclofenac Sodium 50 MG Oral Tab EC, Take 1 tablet (50 mg total) by mouth 2 (two) times daily as needed (for headache, use sparingly). , Disp: 15 tablet, Rfl: 0  •  Fluticasone Propionate 50 MCG/ACT Nasal Suspens reviewed. Patient (or parent) agrees to plan. Lisa Junior DO  Family Medicine   1/29/2021  9:59 AM    Please note that the following visit was completed using two-way, real-time interactive video communication.  This has been done in [place]

## 2021-01-29 NOTE — PATIENT INSTRUCTIONS
Medrol dose pack (steroid) for sinus congestion and pressure for one week. Take in AM with food.   Continue Flonase and add singulair pill nightly for chronic allergies and congestion        Sinusitis (No Antibiotics)    The sinuses are air-filled spaces · You can use an over-the-counter decongestant, unless a similar medicine was prescribed to you. Nasal sprays work the fastest. Use one that has phenylephrine or oxymetazoline. First blow your nose gently. Then use the spray.  Don't use these medicines more · Seizure  · Trouble breathing  · Feeling dizzy or faint  · Fingernails, skin, or lips look blue, purple, or gray    Genevieve last reviewed this educational content on 3/1/2020  © 0708-3182 [employer] [address].  [address] [place] [address]

## 2021-02-18 ENCOUNTER — TELEPHONE (OUTPATIENT)
Dept: FAMILY MEDICINE CLINIC | Facility: CLINIC | Age: 36
End: 2021-02-18

## 2021-02-18 NOTE — TELEPHONE ENCOUNTER
Future Appointments   Date Time Provider Harriet Bell   2/19/2021 12:15 PM Conner Hamilton MD [facility] [address] [facility] [address]   4/26/2021 11:15 AM [facility] OB PFLD NURSE [facility] P [facility] [address]   6/2/2021 12:30 PM BILLY Spears [facility] P [facility] [address]

## 2021-02-19 ENCOUNTER — TELEMEDICINE (OUTPATIENT)
Dept: FAMILY MEDICINE CLINIC | Facility: CLINIC | Age: 36
End: 2021-02-19
Payer: MEDICAID

## 2021-02-19 ENCOUNTER — TELEPHONE (OUTPATIENT)
Dept: FAMILY MEDICINE CLINIC | Facility: CLINIC | Age: 36
End: 2021-02-19

## 2021-02-19 DIAGNOSIS — Z02.9 ADMINISTRATIVE ENCOUNTER: Primary | ICD-10-CM

## 2021-02-19 NOTE — TELEPHONE ENCOUNTER
Patient states she's been waiting for a VV for 15 minutes, checked with the doctor and he will call her as soon as he can. Her response was, \"can't keep taking all this time off work, has to go back to work, so forget about the appt\" and hung up on me.

## 2021-02-23 DIAGNOSIS — G43.701 CHRONIC MIGRAINE WITHOUT AURA WITH STATUS MIGRAINOSUS, NOT INTRACTABLE: ICD-10-CM

## 2021-02-23 RX ORDER — BUTALBITAL, ACETAMINOPHEN AND CAFFEINE 300; 40; 50 MG/1; MG/1; MG/1
1 CAPSULE ORAL EVERY 6 HOURS PRN
Qty: 40 CAPSULE | Refills: 0 | Status: SHIPPED | OUTPATIENT
Start: 2021-02-23 | End: 2021-04-26

## 2021-02-23 RX ORDER — FLUOXETINE HYDROCHLORIDE 40 MG/1
40 CAPSULE ORAL DAILY
Qty: 30 CAPSULE | Refills: 5 | Status: SHIPPED | OUTPATIENT
Start: 2021-02-23

## 2021-02-23 NOTE — TELEPHONE ENCOUNTER
Requesting FLUOXETINE HCL 40 MG  LOV: 1/9/21   RTC:   Last Relevant Labs: 8/4/20  Filled: 3/4/20 # 30  with 5 refills    Future Appointments   Date Time Provider Harriet Bell   4/26/2021 11:15 AM [facility] OB PFLD NURSE [facility] P [facility] [address]   6/2/2021

## 2021-02-23 NOTE — TELEPHONE ENCOUNTER
Remind to watch for upset stomach, epigastric pain, because prozac can interact with diclofenac to increase risk of GI bleeding. Use diclofenac sparingly.

## 2021-02-23 NOTE — TELEPHONE ENCOUNTER
Requesting Butabital  LOV: 1/29/21  RTC: prn  Last Relevant Labs:   Filled: 11/23/2020 #40 with 0 refills    Future Appointments   Date Time Provider Harriet Bell   4/26/2021 11:15 AM [facility] OB PFLD NURSE [facility] P [facility] [address]   6/2/2021 12:30 PM Gal

## 2021-03-06 ENCOUNTER — HOSPITAL ENCOUNTER (EMERGENCY)
Age: 36
Discharge: HOME | End: 2021-03-06
Attending: EMERGENCY MEDICINE
Payer: MEDICAID

## 2021-03-06 ENCOUNTER — APPOINTMENT (OUTPATIENT)
Dept: CT IMAGING | Age: 36
End: 2021-03-06
Attending: EMERGENCY MEDICINE
Payer: MEDICAID

## 2021-03-06 VITALS
HEART RATE: 90 BPM | RESPIRATION RATE: 16 BRPM | OXYGEN SATURATION: 99 % | SYSTOLIC BLOOD PRESSURE: 135 MMHG | WEIGHT: 224.88 LBS | TEMPERATURE: 97 F | DIASTOLIC BLOOD PRESSURE: 74 MMHG | BODY MASS INDEX: 41 KG/M2

## 2021-03-06 DIAGNOSIS — S16.1XXA STRAIN OF NECK MUSCLE, INITIAL ENCOUNTER: ICD-10-CM

## 2021-03-06 DIAGNOSIS — S06.0X0A CONCUSSION WITHOUT LOSS OF CONSCIOUSNESS, INITIAL ENCOUNTER: Primary | ICD-10-CM

## 2021-03-06 PROCEDURE — 99284 EMERGENCY DEPT VISIT MOD MDM: CPT

## 2021-03-06 PROCEDURE — 99285 EMERGENCY DEPT VISIT HI MDM: CPT

## 2021-03-06 PROCEDURE — 70450 CT HEAD/BRAIN W/O DYE: CPT | Performed by: EMERGENCY MEDICINE

## 2021-03-06 PROCEDURE — 72125 CT NECK SPINE W/O DYE: CPT | Performed by: EMERGENCY MEDICINE

## 2021-03-06 RX ORDER — ONDANSETRON 8 MG/1
8 TABLET, ORALLY DISINTEGRATING ORAL EVERY 6 HOURS PRN
Qty: 10 TABLET | Refills: 0 | Status: SHIPPED | OUTPATIENT
Start: 2021-03-06 | End: 2021-09-14

## 2021-03-07 NOTE — ED PROVIDER NOTES
Patient Seen in: THE [facility] Emergency Department In [place]      History   Patient presents with:  Trauma    Stated Complaint: Bumped the top of her head on the corner of a countertop at work on Wednesday.  *    HPI/Subjective:   HPI    Patient had a head complaint: Bumped the top of her head on the corner of a countertop at work on Wednesday. *  Other systems are as noted in HPI. Constitutional and vital signs reviewed. All other systems reviewed and negative except as noted above.     Physical Exam diagnosis)  Strain of neck muscle, initial encounter    Disposition:  Discharge  3/6/2021  9:59 pm    Follow-up:  Benjie Muhammad MD  [address]  [address], [address] [place] [id number] [phone]    Call in 2 days            Medications

## 2021-03-07 NOTE — ED INITIAL ASSESSMENT (HPI)
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 Denies loc. C/o nausea x 2 days, dizziness and headache.

## 2021-03-22 ENCOUNTER — TELEMEDICINE (OUTPATIENT)
Dept: FAMILY MEDICINE CLINIC | Facility: CLINIC | Age: 36
End: 2021-03-22

## 2021-03-22 DIAGNOSIS — J01.00 ACUTE NON-RECURRENT MAXILLARY SINUSITIS: Primary | ICD-10-CM

## 2021-03-22 PROCEDURE — 99213 OFFICE O/P EST LOW 20 MIN: CPT | Performed by: FAMILY MEDICINE

## 2021-03-22 RX ORDER — FLUCONAZOLE 150 MG/1
150 TABLET ORAL ONCE
Qty: 1 TABLET | Refills: 0 | Status: SHIPPED | OUTPATIENT
Start: 2021-03-22 | End: 2021-03-22

## 2021-03-22 RX ORDER — CEFDINIR 300 MG/1
300 CAPSULE ORAL 2 TIMES DAILY
Qty: 20 CAPSULE | Refills: 0 | Status: SHIPPED | OUTPATIENT
Start: 2021-03-22 | End: 2021-04-01

## 2021-03-22 NOTE — PROGRESS NOTES
TELEMEDICINE VISIT by phone  This visit is conducted using Telemedicine with live, interactive video    Verification of patient identity was established by the  patient (s)  Brie Rea verbally consents to a telem 38.0-38.9,adult      Irregular menstrual cycle            Current Outpatient Medications:   •  cefdinir 300 MG Oral Cap, Take 1 capsule (300 mg total) by mouth 2 (two) times daily for 10 days. , Disp: 20 capsule, Rfl: 0  •  fluconazole (DIFLUCAN) 150 MG Ora with cefdinir recently passed. Tylenol fluids rest.  Risks, benefits, and alternatives of current treatment plan discussed in detail. Questions and concerns addressed. Red flags to RTC or ED reviewed. Patient (or parent) agrees to plan.       No follow-u

## 2021-04-26 ENCOUNTER — NURSE ONLY (OUTPATIENT)
Dept: OBGYN CLINIC | Facility: CLINIC | Age: 36
End: 2021-04-26
Payer: MEDICAID

## 2021-04-26 VITALS — WEIGHT: 221.63 LBS | DIASTOLIC BLOOD PRESSURE: 60 MMHG | BODY MASS INDEX: 41 KG/M2 | SYSTOLIC BLOOD PRESSURE: 100 MMHG

## 2021-04-26 DIAGNOSIS — G43.701 CHRONIC MIGRAINE WITHOUT AURA WITH STATUS MIGRAINOSUS, NOT INTRACTABLE: ICD-10-CM

## 2021-04-26 PROCEDURE — 96372 THER/PROPH/DIAG INJ SC/IM: CPT | Performed by: OBSTETRICS & GYNECOLOGY

## 2021-04-26 PROCEDURE — 3074F SYST BP LT 130 MM HG: CPT | Performed by: OBSTETRICS & GYNECOLOGY

## 2021-04-26 PROCEDURE — 3078F DIAST BP <80 MM HG: CPT | Performed by: OBSTETRICS & GYNECOLOGY

## 2021-04-26 RX ORDER — BUTALBITAL, ACETAMINOPHEN AND CAFFEINE 300; 40; 50 MG/1; MG/1; MG/1
1 CAPSULE ORAL EVERY 6 HOURS PRN
Qty: 40 CAPSULE | Refills: 0 | Status: SHIPPED | OUTPATIENT
Start: 2021-04-26 | End: 2021-05-10

## 2021-04-26 RX ADMIN — MEDROXYPROGESTERONE ACETATE 150 MG: 150 INJECTION, SUSPENSION INTRAMUSCULAR at 11:26:00

## 2021-04-26 NOTE — TELEPHONE ENCOUNTER
Requesting Butabital  LOV: 3/22/21 VV  RTC: prn  Last Relevant Labs:   Filled: 2/23/21 #40 with 0 refills    Future Appointments   Date Time Provider Harriet Bell   6/2/2021 12:30 PM BILLY Giraldo [facility]N P [facility] [address]   7/23/2021 11:15 A

## 2021-04-26 NOTE — PROGRESS NOTES
Patient presents to office today for Depo provera injection.   Last injection administered on: 1/27/21    Last OV: 1/27/21  BP: 100/60  Wt: 224 lbs    Today:  BP: 100/60  Wt: 221.6 lbs    Patient name, date of birth, and medication name, dose and route veri

## 2021-04-29 ENCOUNTER — TELEMEDICINE (OUTPATIENT)
Dept: FAMILY MEDICINE CLINIC | Facility: CLINIC | Age: 36
End: 2021-04-29
Payer: MEDICAID

## 2021-04-29 ENCOUNTER — TELEPHONE (OUTPATIENT)
Dept: FAMILY MEDICINE CLINIC | Facility: CLINIC | Age: 36
End: 2021-04-29

## 2021-04-29 DIAGNOSIS — J01.00 ACUTE NON-RECURRENT MAXILLARY SINUSITIS: Primary | ICD-10-CM

## 2021-04-29 PROCEDURE — 99213 OFFICE O/P EST LOW 20 MIN: CPT | Performed by: FAMILY MEDICINE

## 2021-04-29 RX ORDER — FLUCONAZOLE 150 MG/1
150 TABLET ORAL ONCE
Qty: 1 TABLET | Refills: 0 | Status: SHIPPED | OUTPATIENT
Start: 2021-04-29 | End: 2021-04-29

## 2021-04-29 RX ORDER — AMOXICILLIN 500 MG/1
500 CAPSULE ORAL 3 TIMES DAILY
Qty: 30 CAPSULE | Refills: 0 | Status: SHIPPED | OUTPATIENT
Start: 2021-04-29 | End: 2021-06-28

## 2021-04-29 NOTE — PROGRESS NOTES
HPI/Subjective:   Patient ID: Ifeoma Hoffman is a 38year old female. HPI  Ms. Matias Chiu is a pleasant 78-year-old female presenting for video visit today for sinus congestion associated postnasal drip for the past few days.   He has been experie qhs 1 Bottle 0   • Diclofenac Sodium 50 MG Oral Tab EC Take 1 tablet (50 mg total) by mouth 2 (two) times daily as needed (for headache, use sparingly).  15 tablet 0   • Fluticasone Propionate 50 MCG/ACT Nasal Suspension 2 sprays each nostril daily 1 Bottle

## 2021-04-29 NOTE — TELEPHONE ENCOUNTER
Future Appointments   Date Time Provider Harriet Bell   4/29/2021  5:30 PM Tanner Tirado MD [facility] [address] [facility] [address]   6/2/2021 12:30 PM BILLY Ortiz [facility] P [facility] [address]   7/23/2021 11:15 AM EM[facility] OB PFLD NURSE [facility] P [facility] [address]

## 2021-04-29 NOTE — PATIENT INSTRUCTIONS
Thank you for choosing [facility] at [place] [address]  To Do: Yovana Irwin  1. Please take meds as directed. [facility] is located in [address]. Monday, Tuesday & Friday – 8 a.m. to 4 p.m.   Wednesday, Thursday – 7 a.m. to outweigh those potential risks and we strive to make you healthier and to improve your quality of life.     Referrals, and Radiology Information:    If your insurance requires a referral to a specialist, please allow 5 business days to process your referral

## 2021-06-02 ENCOUNTER — OFFICE VISIT (OUTPATIENT)
Dept: OBGYN CLINIC | Facility: CLINIC | Age: 36
End: 2021-06-02
Payer: MEDICAID

## 2021-06-02 VITALS
HEIGHT: 60 IN | DIASTOLIC BLOOD PRESSURE: 68 MMHG | SYSTOLIC BLOOD PRESSURE: 110 MMHG | RESPIRATION RATE: 16 BRPM | BODY MASS INDEX: 42.17 KG/M2 | HEART RATE: 71 BPM | WEIGHT: 214.81 LBS

## 2021-06-02 DIAGNOSIS — Z01.419 WELL WOMAN EXAM WITH ROUTINE GYNECOLOGICAL EXAM: Primary | ICD-10-CM

## 2021-06-02 PROCEDURE — 3078F DIAST BP <80 MM HG: CPT | Performed by: NURSE PRACTITIONER

## 2021-06-02 PROCEDURE — 99395 PREV VISIT EST AGE 18-39: CPT | Performed by: NURSE PRACTITIONER

## 2021-06-02 PROCEDURE — 3074F SYST BP LT 130 MM HG: CPT | Performed by: NURSE PRACTITIONER

## 2021-06-02 PROCEDURE — 3008F BODY MASS INDEX DOCD: CPT | Performed by: NURSE PRACTITIONER

## 2021-06-02 RX ORDER — BUTALBITAL, ACETAMINOPHEN AND CAFFEINE 50; 325; 40 MG/1; MG/1; MG/1
1 TABLET ORAL EVERY 4 HOURS PRN
COMMUNITY
End: 2021-07-09

## 2021-06-02 NOTE — PROGRESS NOTES
Here for Routine Annual Exam  No concerns or questions. Menses are regulating to around every 3 months when Depo shot is due. Contraception- Depo shot. No C/O- OK to skip pap. ROS: No Cardiac, Respiratory, GI,  or Neurological symptoms.     PE:

## 2021-06-24 ENCOUNTER — TELEPHONE (OUTPATIENT)
Dept: FAMILY MEDICINE CLINIC | Facility: CLINIC | Age: 36
End: 2021-06-24

## 2021-06-24 NOTE — TELEPHONE ENCOUNTER
Patients needs appointment to be advised medication. Please schedule. Can schedule video with other provider today.

## 2021-06-28 ENCOUNTER — TELEPHONE (OUTPATIENT)
Dept: FAMILY MEDICINE CLINIC | Facility: CLINIC | Age: 36
End: 2021-06-28

## 2021-06-28 ENCOUNTER — VIRTUAL PHONE E/M (OUTPATIENT)
Dept: FAMILY MEDICINE CLINIC | Facility: CLINIC | Age: 36
End: 2021-06-28
Payer: MEDICAID

## 2021-06-28 DIAGNOSIS — F32.A DEPRESSION, UNSPECIFIED DEPRESSION TYPE: Primary | ICD-10-CM

## 2021-06-28 DIAGNOSIS — J01.00 ACUTE NON-RECURRENT MAXILLARY SINUSITIS: ICD-10-CM

## 2021-06-28 PROCEDURE — 99213 OFFICE O/P EST LOW 20 MIN: CPT | Performed by: FAMILY MEDICINE

## 2021-06-28 RX ORDER — AMOXICILLIN 500 MG/1
500 CAPSULE ORAL 3 TIMES DAILY
Qty: 21 CAPSULE | Refills: 0 | Status: SHIPPED | OUTPATIENT
Start: 2021-06-28 | End: 2021-11-03

## 2021-06-28 NOTE — PROGRESS NOTES
TELEMEDICINE VISIT by phone  This visit is conducted using Telemedicine with live, interactive audio    Verification of patient identity was established by the  patient (s)  Anita Dugan verbally consents to a telem hours as needed for Nausea., Disp: 10 tablet, Rfl: 0  •  FLUoxetine HCl 40 MG Oral Cap, Take 1 capsule (40 mg total) by mouth daily. , Disp: 30 capsule, Rfl: 5    REVIEW OF SYSTEMS:     Comprehensive ROS negative unless noted in HPI    PHYSICAL EXAM:   LMP as detailed in the plan of care above.

## 2021-06-28 NOTE — TELEPHONE ENCOUNTER
Pts mother passed away. She would like to know if she can take two of the Fluoxetine instead of one for the next few days. Emotions are going through the roof and would just like this for temporary use.

## 2021-07-09 NOTE — TELEPHONE ENCOUNTER
Medication Quantity Refills Start End   Butalbital-APAP-Caffeine -40 MG Oral Cap () 40 capsule 0 2021 5/10/2021   Sig:   Take 1 capsule by mouth every 6 (six) hours as needed for Pain or Headaches.        Virtual visit 21 for shaw

## 2021-07-10 ENCOUNTER — HOSPITAL ENCOUNTER (EMERGENCY)
Age: 36
Discharge: HOME | End: 2021-07-10
Attending: EMERGENCY MEDICINE
Payer: MEDICAID

## 2021-07-10 ENCOUNTER — APPOINTMENT (OUTPATIENT)
Dept: GENERAL RADIOLOGY | Age: 36
End: 2021-07-10
Attending: EMERGENCY MEDICINE
Payer: MEDICAID

## 2021-07-10 VITALS
HEART RATE: 92 BPM | SYSTOLIC BLOOD PRESSURE: 110 MMHG | DIASTOLIC BLOOD PRESSURE: 76 MMHG | TEMPERATURE: 98 F | RESPIRATION RATE: 16 BRPM | WEIGHT: 215 LBS | BODY MASS INDEX: 42 KG/M2 | OXYGEN SATURATION: 97 %

## 2021-07-10 DIAGNOSIS — M72.2 PLANTAR FASCIITIS: Primary | ICD-10-CM

## 2021-07-10 PROCEDURE — 99283 EMERGENCY DEPT VISIT LOW MDM: CPT

## 2021-07-10 PROCEDURE — 73610 X-RAY EXAM OF ANKLE: CPT | Performed by: EMERGENCY MEDICINE

## 2021-07-10 PROCEDURE — 73630 X-RAY EXAM OF FOOT: CPT | Performed by: EMERGENCY MEDICINE

## 2021-07-10 RX ORDER — HYDROCODONE BITARTRATE AND ACETAMINOPHEN 5; 325 MG/1; MG/1
1 TABLET ORAL EVERY 6 HOURS PRN
Qty: 10 TABLET | Refills: 0 | Status: SHIPPED | OUTPATIENT
Start: 2021-07-10 | End: 2022-01-19

## 2021-07-11 NOTE — ED PROVIDER NOTES
Patient Seen in: THE [facility] Emergency Department In [place]      History   Patient presents with:  Leg or Foot Injury    Stated Complaint: R heel pain    HPI/Subjective:   HPI    Patient is a 25-year-old female presents emergency room with a history of pa reviewed. All other systems reviewed and negative except as noted above.     Physical Exam     ED Triage Vitals [07/10/21 1927]   /76   Pulse 92   Resp 16   Temp    Temp src    SpO2 97 %   O2 Device None (Room air)       Current:/76   Pulse 7/10/2021 at 8:07 PM     Finalized by (CST): Mai Michael MD on 7/10/2021 at 8:07 PM              MDM      Patient had x-rays noted above. Patient sitting back and breathing easily in no apparent distress at this time.   Patient symptoms are consistent

## 2021-07-12 RX ORDER — BUTALBITAL, ACETAMINOPHEN AND CAFFEINE 50; 325; 40 MG/1; MG/1; MG/1
1 TABLET ORAL EVERY 4 HOURS PRN
Qty: 40 TABLET | Refills: 0 | Status: SHIPPED | OUTPATIENT
Start: 2021-07-12 | End: 2021-10-05

## 2021-07-22 RX ORDER — MEDROXYPROGESTERONE ACETATE 150 MG/ML
150 INJECTION, SUSPENSION INTRAMUSCULAR
Status: SHIPPED | OUTPATIENT
Start: 2021-07-23 | End: 2022-07-17

## 2021-07-23 DIAGNOSIS — Z30.42 ENCOUNTER FOR SURVEILLANCE OF INJECTABLE CONTRACEPTIVE: Primary | ICD-10-CM

## 2021-07-26 ENCOUNTER — NURSE ONLY (OUTPATIENT)
Dept: OBGYN CLINIC | Facility: CLINIC | Age: 36
End: 2021-07-26
Payer: MEDICAID

## 2021-07-26 VITALS — BODY MASS INDEX: 43 KG/M2 | SYSTOLIC BLOOD PRESSURE: 110 MMHG | DIASTOLIC BLOOD PRESSURE: 60 MMHG | WEIGHT: 221.19 LBS

## 2021-07-26 PROCEDURE — 3078F DIAST BP <80 MM HG: CPT | Performed by: OBSTETRICS & GYNECOLOGY

## 2021-07-26 PROCEDURE — 96372 THER/PROPH/DIAG INJ SC/IM: CPT | Performed by: OBSTETRICS & GYNECOLOGY

## 2021-07-26 PROCEDURE — 3074F SYST BP LT 130 MM HG: CPT | Performed by: OBSTETRICS & GYNECOLOGY

## 2021-07-26 RX ADMIN — MEDROXYPROGESTERONE ACETATE 150 MG: 150 INJECTION, SUSPENSION INTRAMUSCULAR at 11:32:00

## 2021-07-26 NOTE — PROGRESS NOTES
Patient presents to office today for Depo provera injection.   Last injection administered on: 4/26/21    Last OV: 6/2/21  BP: 110/68  Wt: 214 lbs    Today:  BP: 110/60  Wt: 221.2 lbs    Patient name, date of birth, and medication name, dose and route verif

## 2021-08-15 ENCOUNTER — MOBILE ENCOUNTER (OUTPATIENT)
Dept: FAMILY MEDICINE CLINIC | Facility: CLINIC | Age: 36
End: 2021-08-15

## 2021-08-16 ENCOUNTER — TELEPHONE (OUTPATIENT)
Dept: FAMILY MEDICINE CLINIC | Facility: CLINIC | Age: 36
End: 2021-08-16

## 2021-08-16 NOTE — TELEPHONE ENCOUNTER
Future Appointments   Date Time Provider Harriet Arabella   8/18/2021  3:40 PM Justo Landin MD [facility] [address] [facility] [address]   10/22/2021 11:15 AM EM[facility] OB PFLD NURSE [facility] OB[facility] P [facility] [address]     Patient has scheduled OV, will be going to ER if pain and discomfort persist. FYI patient canceled son's appt, moved herself under sons appointment time. Patient states son only needs school form filled out, will bring form for PCP to fill out.

## 2021-08-16 NOTE — TELEPHONE ENCOUNTER
Patient calling, has painful boil on right leg. Patient states it is very uncomfortable, has not burst. Called on call-provider several times last night, did not hear back.  Please advise

## 2021-08-17 ENCOUNTER — APPOINTMENT (OUTPATIENT)
Dept: GENERAL RADIOLOGY | Age: 36
End: 2021-08-17
Attending: EMERGENCY MEDICINE
Payer: MEDICAID

## 2021-08-17 ENCOUNTER — HOSPITAL ENCOUNTER (EMERGENCY)
Age: 36
Discharge: HOME | End: 2021-08-17
Attending: EMERGENCY MEDICINE
Payer: MEDICAID

## 2021-08-17 VITALS
DIASTOLIC BLOOD PRESSURE: 62 MMHG | HEART RATE: 92 BPM | OXYGEN SATURATION: 98 % | HEIGHT: 62 IN | BODY MASS INDEX: 39.56 KG/M2 | RESPIRATION RATE: 18 BRPM | WEIGHT: 215 LBS | SYSTOLIC BLOOD PRESSURE: 124 MMHG | TEMPERATURE: 98 F

## 2021-08-17 DIAGNOSIS — S92.355A NONDISPLACED FRACTURE OF FIFTH METATARSAL BONE, LEFT FOOT, INITIAL ENCOUNTER FOR CLOSED FRACTURE: Primary | ICD-10-CM

## 2021-08-17 PROCEDURE — 28470 CLTX METATARSAL FX WO MNP EA: CPT

## 2021-08-17 PROCEDURE — 99283 EMERGENCY DEPT VISIT LOW MDM: CPT

## 2021-08-17 PROCEDURE — 73630 X-RAY EXAM OF FOOT: CPT | Performed by: EMERGENCY MEDICINE

## 2021-08-17 PROCEDURE — 73610 X-RAY EXAM OF ANKLE: CPT | Performed by: EMERGENCY MEDICINE

## 2021-08-17 RX ORDER — HYDROCODONE BITARTRATE AND ACETAMINOPHEN 5; 325 MG/1; MG/1
2 TABLET ORAL ONCE
Status: DISCONTINUED | OUTPATIENT
Start: 2021-08-17 | End: 2021-08-18

## 2021-08-17 RX ORDER — HYDROCODONE BITARTRATE AND ACETAMINOPHEN 5; 325 MG/1; MG/1
1-2 TABLET ORAL EVERY 6 HOURS PRN
Qty: 12 TABLET | Refills: 0 | Status: SHIPPED | OUTPATIENT
Start: 2021-08-17 | End: 2021-08-24

## 2021-08-18 ENCOUNTER — TELEPHONE (OUTPATIENT)
Dept: FAMILY MEDICINE CLINIC | Facility: CLINIC | Age: 36
End: 2021-08-18

## 2021-08-18 ENCOUNTER — OFFICE VISIT (OUTPATIENT)
Dept: FAMILY MEDICINE CLINIC | Facility: CLINIC | Age: 36
End: 2021-08-18
Payer: MEDICAID

## 2021-08-18 ENCOUNTER — TELEPHONE (OUTPATIENT)
Dept: ORTHOPEDICS CLINIC | Facility: CLINIC | Age: 36
End: 2021-08-18

## 2021-08-18 VITALS
TEMPERATURE: 98 F | HEART RATE: 80 BPM | DIASTOLIC BLOOD PRESSURE: 60 MMHG | SYSTOLIC BLOOD PRESSURE: 120 MMHG | RESPIRATION RATE: 16 BRPM

## 2021-08-18 DIAGNOSIS — T14.8XXA: ICD-10-CM

## 2021-08-18 DIAGNOSIS — L02.429 BOIL, THIGH: ICD-10-CM

## 2021-08-18 DIAGNOSIS — S92.352A CLOSED DISPLACED FRACTURE OF FIFTH METATARSAL BONE OF LEFT FOOT, INITIAL ENCOUNTER: Primary | ICD-10-CM

## 2021-08-18 PROCEDURE — 3074F SYST BP LT 130 MM HG: CPT | Performed by: FAMILY MEDICINE

## 2021-08-18 PROCEDURE — 99214 OFFICE O/P EST MOD 30 MIN: CPT | Performed by: FAMILY MEDICINE

## 2021-08-18 PROCEDURE — 3078F DIAST BP <80 MM HG: CPT | Performed by: FAMILY MEDICINE

## 2021-08-18 NOTE — TELEPHONE ENCOUNTER
Patient states the ER casted her broken pinky toe all wrong, up to the knee. She called ortho and they wrongly turned away her ER f/up appt. She wants the doctor to manage her broken bone.    Future Appointments   Date Time Provider Harriet Klein

## 2021-08-18 NOTE — TELEPHONE ENCOUNTER
I will discuss with her at her appt today. I reviewed the x-rays and ER visit. She has  comminuted fracture at the base of the 5th metatarsal.     I don't take care of that problem. She will need to see orthopedic surgeon or podiatry.

## 2021-08-18 NOTE — TELEPHONE ENCOUNTER
Her son would need appt to have current date and exam on school physical form, otherwise I would put the last date of physical on the form which may not be current enough for the school. Since my schedule is full right now and school form needed, please double book appt for wcv for the son with her appt. So I can get the current date and exam on the form.

## 2021-08-18 NOTE — ED NOTES
Pt called and states that the doctor she was given for follow up care is refusing to see her for an appointment. RN offered the  number to assist with finding a different doctor for follow up.  Pt states \"you know what, Im just going to take th

## 2021-08-18 NOTE — ED PROVIDER NOTES
Patient Seen in: THE [facility] Emergency Department In [place]      History   Patient presents with:  Leg or Foot Injury    Stated Complaint:     HPI/Subjective:   HPI    20-year-old woman here with complaint of pain to the outside of her left foot.   She was extremity  Neurological: Awake alert, speech is normal    ED Course   Labs Reviewed - No data to display       XR ANKLE (MIN 3 VIEWS), LEFT (CPT=73610)    Result Date: 8/17/2021  PROCEDURE:  XR ANKLE (MIN 3 VIEWS), LEFT (CPT=73610)  TECHNIQUE:  Three views while working out, twisted/rolled it. FINDINGS:  Minimally displaced and comminuted intra-articular fracture at the base of the left 5th metatarsal.  Mild lateral soft tissue swelling. Normal tarsal alignment. Normal mineralization.   Moderate plantar provider.

## 2021-08-18 NOTE — TELEPHONE ENCOUNTER
8/18/21 XR FOOT, COMPLETE (MIN 3 VIEWS), LEFT  CONCLUSION:  Minimally displaced and comminuted intra-articular fracture at the base of the left 5th metatarsal.  Mild lateral soft tissue swelling.

## 2021-08-18 NOTE — ED INITIAL ASSESSMENT (HPI)
Pt states she injured lt foot while doing a \"shuffle\" exercise at the gym tonight. Pt states she rolled her foot and felt a snap.

## 2021-08-18 NOTE — TELEPHONE ENCOUNTER
· Fractured her pink toe on LEFT foot  · Nondisplaced fracture of fifth metatarsal bone, left foot, initial encounter for closed fracture    · PCP office called to ask if she can be seen this week

## 2021-08-18 NOTE — PROGRESS NOTES
HPI:   Jean-Pierre Wakefield is a 43year old female that presents for Patient presents with:  Abscess: on right side outer pelvic area  ER F/U: from 8/17 - left foot 5th toe broken - states foot and ankle specialist will not see her    Patient is here for HYDROcodone-acetaminophen 5-325 MG Oral Tab, Take 1 tablet by mouth every 6 (six) hours as needed for Pain., Disp: 10 tablet, Rfl: 0  •  ondansetron 8 MG Oral Tablet Dispersible, Take 1 tablet (8 mg total) by mouth every 6 (six) hours as needed for Nausea. 5/5 strength, sensation intact to light touch and pin prick, gait is normal  NEURO:  cranial nerves II - XII grossly intact, no tremor, no abnormal movements, 2+ deep tendon reflexes bilateral patellar and achilles, bilateral upper extremities    ASSESSMEN or ED reviewed. Patient (or parent) agrees to plan. No follow-ups on file. Yann Heart M.D.   Family Medicine   8/18/2021  6:39 PM    Spent a total of 30 minutes obtaining history evaluating patient, reviewing medical chart, discussing treatment

## 2021-08-19 ENCOUNTER — TELEPHONE (OUTPATIENT)
Dept: FAMILY MEDICINE CLINIC | Facility: CLINIC | Age: 36
End: 2021-08-19

## 2021-08-19 ENCOUNTER — TELEPHONE (OUTPATIENT)
Dept: ORTHOPEDICS CLINIC | Facility: CLINIC | Age: 36
End: 2021-08-19

## 2021-08-19 DIAGNOSIS — M79.672 LEFT FOOT PAIN: Primary | ICD-10-CM

## 2021-08-19 NOTE — TELEPHONE ENCOUNTER
8/17/21   XR FOOT, COMPLETE (MIN 3 VIEWS), LEFT  Impression   CONCLUSION:  Minimally displaced and comminuted intra-articular fracture at the base of the left 5th metatarsal.  Mild lateral soft tissue swelling.       No further imaging needed

## 2021-08-19 NOTE — TELEPHONE ENCOUNTER
Pt called and kind of rude to me but I asked nicely  if I can put on hold to get through messages and help her but she hung up.

## 2021-08-19 NOTE — TELEPHONE ENCOUNTER
Pt would like to know if she can move the ace wrap and move reposition the hard part. Swelling is reducing and that keeps moving. She is getting blisters on her heel. Ortho doctor not able to advise her on this as he has not seen her yet.  Has an upcoming a

## 2021-08-19 NOTE — TELEPHONE ENCOUNTER
Attempted to reach patient, no answer, left message on identifying voicemail, to contact the office to discuss further    Future Appointments   Date Time Provider Harriet Bell   10/22/2021 11:15 AM [facility] OB PFLD NURSE [facility] P [facility] [address]

## 2021-08-19 NOTE — TELEPHONE ENCOUNTER
Imaging was completed for this patient for a LT 5th toe FX, but it needs to be reviewed to see if additional imaging is needed. If so, please enter the appropriate RX and REPLY TO THIS MESSAGE wso that I can schedule the Xray appt.   Patient was asked to c

## 2021-08-19 NOTE — TELEPHONE ENCOUNTER
Nathan Linares., DPM     She needs to stay non weight bearing for about 2 weeks from injury and then can follow up. No surgery needed.    JF

## 2021-08-23 ENCOUNTER — HOSPITAL ENCOUNTER (EMERGENCY)
Age: 36
Discharge: HOME | End: 2021-08-23
Attending: EMERGENCY MEDICINE
Payer: MEDICAID

## 2021-08-23 VITALS
RESPIRATION RATE: 17 BRPM | WEIGHT: 213.88 LBS | HEART RATE: 78 BPM | TEMPERATURE: 98 F | OXYGEN SATURATION: 98 % | BODY MASS INDEX: 39 KG/M2 | DIASTOLIC BLOOD PRESSURE: 73 MMHG | SYSTOLIC BLOOD PRESSURE: 121 MMHG

## 2021-08-23 DIAGNOSIS — S92.355A CLOSED NONDISPLACED FRACTURE OF FIFTH METATARSAL BONE OF LEFT FOOT, INITIAL ENCOUNTER: Primary | ICD-10-CM

## 2021-08-23 DIAGNOSIS — R20.2 PARESTHESIA: ICD-10-CM

## 2021-08-23 PROCEDURE — 29515 APPLICATION SHORT LEG SPLINT: CPT

## 2021-08-23 PROCEDURE — 99283 EMERGENCY DEPT VISIT LOW MDM: CPT

## 2021-08-23 PROCEDURE — 99282 EMERGENCY DEPT VISIT SF MDM: CPT

## 2021-08-24 ENCOUNTER — OFFICE VISIT (OUTPATIENT)
Dept: ORTHOPEDICS CLINIC | Facility: CLINIC | Age: 36
End: 2021-08-24
Payer: MEDICAID

## 2021-08-24 VITALS — OXYGEN SATURATION: 99 % | HEART RATE: 104 BPM

## 2021-08-24 DIAGNOSIS — F17.200 SMOKER: ICD-10-CM

## 2021-08-24 DIAGNOSIS — S92.355A CLOSED NONDISPLACED FRACTURE OF FIFTH METATARSAL BONE OF LEFT FOOT, INITIAL ENCOUNTER: Primary | ICD-10-CM

## 2021-08-24 PROCEDURE — 99203 OFFICE O/P NEW LOW 30 MIN: CPT | Performed by: PODIATRIST

## 2021-08-24 NOTE — ED INITIAL ASSESSMENT (HPI)
Pt c/o tingling to lt foot/toes with splint in place to lt lower leg after she fractured foot on 8/17 and had splint placed here. Pt reports tight feeling to lt calf/lower leg and states her toes feel cold.   Pt has good color and cap refill upon triage bu

## 2021-08-24 NOTE — PROGRESS NOTES
[facility] New [facility] Note    CC: Patient presents with:  Fracture: left foot 5th toe fracture       HPI: The patient is a 43year old female who presents today with complaints of injury to her left foot 1 week ago, inverted foot during a work Not on file    Tobacco Use      Smoking status: Current Every Day Smoker        Packs/day: 0.50        Types: Cigarettes      Smokeless tobacco: Never Used    Vaping Use      Vaping Use: Never used    Substance and Sexual Activity      Alcohol use:  Yes

## 2021-08-24 NOTE — ED PROVIDER NOTES
Patient Seen in: THE [facility] Emergency Department In [place]      History   Patient presents with:  Cast Splint Problem    Stated Complaint: pt was seen here for broken foot last tuesday, now experiencing \"loss of sesnat*    HPI/Subjective:   HPI    The p Resp 17   Wt 97 kg   LMP 07/22/2021   SpO2 98%   BMI 39.11 kg/m²         Physical Exam    General: Comfortable and well appearing. Alert and oriented in no distress.   Neuro: Grossly normal and symmetric motor strength and sensation proximally and distally

## 2021-09-13 ENCOUNTER — TELEMEDICINE (OUTPATIENT)
Dept: FAMILY MEDICINE CLINIC | Facility: CLINIC | Age: 36
End: 2021-09-13

## 2021-09-13 DIAGNOSIS — J01.00 ACUTE NON-RECURRENT MAXILLARY SINUSITIS: Primary | ICD-10-CM

## 2021-09-13 PROCEDURE — 99442 PHONE E/M BY PHYS 11-20 MIN: CPT | Performed by: FAMILY MEDICINE

## 2021-09-13 RX ORDER — FLUTICASONE PROPIONATE 50 MCG
SPRAY, SUSPENSION (ML) NASAL
Qty: 1 EACH | Refills: 0 | Status: SHIPPED | OUTPATIENT
Start: 2021-09-13

## 2021-09-13 RX ORDER — LEVOFLOXACIN 500 MG/1
500 TABLET, FILM COATED ORAL DAILY
Qty: 10 TABLET | Refills: 0 | Status: SHIPPED | OUTPATIENT
Start: 2021-09-13 | End: 2021-09-23

## 2021-09-13 RX ORDER — BUTALBITAL, ACETAMINOPHEN AND CAFFEINE 50; 325; 40 MG/1; MG/1; MG/1
1 CAPSULE ORAL EVERY 4 HOURS PRN
Qty: 40 CAPSULE | Refills: 0 | Status: SHIPPED | OUTPATIENT
Start: 2021-09-13 | End: 2021-12-27

## 2021-09-13 NOTE — PROGRESS NOTES
TELEMEDICINE VISIT by phone  This visit is conducted using Telemedicine with live, interactive video    Verification of patient identity was established by the  patient (s)  Brandon Garces verbally consents to a telem mouth daily for 10 days. , Disp: 10 tablet, Rfl: 0  •  Butalbital-APAP-Caffeine -40 MG Oral Cap, Take 1 capsule by mouth every 4 (four) hours as needed for Pain or Headaches., Disp: 40 capsule, Rfl: 0  •  Butalbital-APAP-Caffeine -40 MG Oral Tab performed. Every conscious effort was taken to allow for sufficient and adequate time. This billing was spent on reviewing labs, medications, radiology tests and decision making.   Appropriate medical decision-making and tests are ordered as detailed in t

## 2021-09-14 ENCOUNTER — HOSPITAL ENCOUNTER (OUTPATIENT)
Dept: GENERAL RADIOLOGY | Age: 36
Discharge: HOME | End: 2021-09-14
Attending: PODIATRIST
Payer: MEDICAID

## 2021-09-14 ENCOUNTER — OFFICE VISIT (OUTPATIENT)
Dept: ORTHOPEDICS CLINIC | Facility: CLINIC | Age: 36
End: 2021-09-14
Payer: MEDICAID

## 2021-09-14 VITALS — HEART RATE: 101 BPM | OXYGEN SATURATION: 99 %

## 2021-09-14 DIAGNOSIS — S92.355A CLOSED NONDISPLACED FRACTURE OF FIFTH METATARSAL BONE OF LEFT FOOT, INITIAL ENCOUNTER: ICD-10-CM

## 2021-09-14 DIAGNOSIS — F17.200 SMOKER: ICD-10-CM

## 2021-09-14 DIAGNOSIS — S92.355D CLOSED NONDISPLACED FRACTURE OF FIFTH METATARSAL BONE OF LEFT FOOT WITH ROUTINE HEALING, SUBSEQUENT ENCOUNTER: Primary | ICD-10-CM

## 2021-09-14 PROCEDURE — 73630 X-RAY EXAM OF FOOT: CPT | Performed by: PODIATRIST

## 2021-09-14 PROCEDURE — 99213 OFFICE O/P EST LOW 20 MIN: CPT | Performed by: PODIATRIST

## 2021-09-16 ENCOUNTER — TELEPHONE (OUTPATIENT)
Dept: OBGYN CLINIC | Facility: CLINIC | Age: 36
End: 2021-09-16

## 2021-09-16 ENCOUNTER — TELEPHONE (OUTPATIENT)
Dept: ORTHOPEDICS CLINIC | Facility: CLINIC | Age: 36
End: 2021-09-16

## 2021-09-16 NOTE — TELEPHONE ENCOUNTER
States she is bleeding more than usual on depo. Periods are bad needs to consult with nurse.     Thank you

## 2021-09-16 NOTE — TELEPHONE ENCOUNTER
Pt started menses 7 days ago; started light but now heavy x 4 days. Pt reports filling a pad every 3 hours; dime sized clots and cramping. Pt advised to take Ibuprofen 600mg q 6 hours x 1-2 days; push fluids and monitor bleeding.   Pt advised to call if

## 2021-09-16 NOTE — TELEPHONE ENCOUNTER
Patient called, saying that she was having issued with her boot that feels like the [facility] or plastic piece on the boot is rubbing on her foot, causing pain. Call was transferred to Rom Llamas.

## 2021-09-23 NOTE — TELEPHONE ENCOUNTER
Patient called regarding issues with the left foot, patient states that she is wearing a boot and her toes on the left foot are swollen . Patient also states that she is not able to bear weight on her foot. Patient would like medical advise.

## 2021-09-24 NOTE — TELEPHONE ENCOUNTER
Patient states that her boot is applying pressure to her 5th metarsal its irritated. Patient added extra padding to her boot and that resolved the issue. No further concerns.

## 2021-10-05 ENCOUNTER — HOSPITAL ENCOUNTER (OUTPATIENT)
Dept: GENERAL RADIOLOGY | Age: 36
Discharge: HOME | End: 2021-10-05
Attending: PODIATRIST
Payer: MEDICAID

## 2021-10-05 ENCOUNTER — OFFICE VISIT (OUTPATIENT)
Dept: ORTHOPEDICS CLINIC | Facility: CLINIC | Age: 36
End: 2021-10-05
Payer: MEDICAID

## 2021-10-05 DIAGNOSIS — S92.355D CLOSED NONDISPLACED FRACTURE OF FIFTH METATARSAL BONE OF LEFT FOOT WITH ROUTINE HEALING, SUBSEQUENT ENCOUNTER: ICD-10-CM

## 2021-10-05 DIAGNOSIS — F17.200 SMOKER: ICD-10-CM

## 2021-10-05 DIAGNOSIS — S92.355D CLOSED NONDISPLACED FRACTURE OF FIFTH METATARSAL BONE OF LEFT FOOT WITH ROUTINE HEALING, SUBSEQUENT ENCOUNTER: Primary | ICD-10-CM

## 2021-10-05 PROCEDURE — 99024 POSTOP FOLLOW-UP VISIT: CPT | Performed by: PODIATRIST

## 2021-10-05 PROCEDURE — 73630 X-RAY EXAM OF FOOT: CPT | Performed by: PODIATRIST

## 2021-10-05 NOTE — PROGRESS NOTES
[facility] Progress [facility]    Subjective: Pt here for follow up about 7 weeks post injury to right 5th metatarsal fracture  Does relate doing some squats in the boot and doing a little too much    Objective:   Left foot  Mild to no pain 5th metatars

## 2021-10-25 ENCOUNTER — NURSE ONLY (OUTPATIENT)
Dept: OBGYN CLINIC | Facility: CLINIC | Age: 36
End: 2021-10-25
Payer: MEDICAID

## 2021-10-25 VITALS — DIASTOLIC BLOOD PRESSURE: 68 MMHG | WEIGHT: 222 LBS | SYSTOLIC BLOOD PRESSURE: 122 MMHG | BODY MASS INDEX: 41 KG/M2

## 2021-10-25 RX ORDER — MELATONIN
325
COMMUNITY

## 2021-10-25 RX ADMIN — MEDROXYPROGESTERONE ACETATE 150 MG: 150 INJECTION, SUSPENSION INTRAMUSCULAR at 11:57:00

## 2021-10-26 ENCOUNTER — TELEPHONE (OUTPATIENT)
Dept: FAMILY MEDICINE CLINIC | Facility: CLINIC | Age: 36
End: 2021-10-26

## 2021-10-26 NOTE — TELEPHONE ENCOUNTER
From OV on 8/18/21    Regarding the boil on her right anterior thigh it is very superficial and no underlying mass felt.   She may use bacitracin ointment or Neosporin ointment with a Band-Aid and change twice a day for the next 3 to 4 days and see if it re

## 2021-10-26 NOTE — TELEPHONE ENCOUNTER
Patient calling, has reoccuring boil around thigh area.  Patient states no improvement with home remedy, asking for other recommedations

## 2021-11-03 ENCOUNTER — VIRTUAL PHONE E/M (OUTPATIENT)
Dept: FAMILY MEDICINE CLINIC | Facility: CLINIC | Age: 36
End: 2021-11-03
Payer: MEDICAID

## 2021-11-03 DIAGNOSIS — J01.00 ACUTE NON-RECURRENT MAXILLARY SINUSITIS: Primary | ICD-10-CM

## 2021-11-03 PROCEDURE — 99212 OFFICE O/P EST SF 10 MIN: CPT | Performed by: FAMILY MEDICINE

## 2021-11-03 RX ORDER — AMOXICILLIN 500 MG/1
500 CAPSULE ORAL 3 TIMES DAILY
Qty: 21 CAPSULE | Refills: 0 | Status: SHIPPED | OUTPATIENT
Start: 2021-11-03 | End: 2021-11-10

## 2021-11-03 NOTE — PROGRESS NOTES
TELEMEDICINE VISIT by phone  This visit is conducted using Telemedicine with live, interactive video    Verification of patient identity was established by the  patient (s)  Shashi Lombardo verbally consents to a telem 1 each, Rfl: 0  •  Butalbital-APAP-Caffeine -40 MG Oral Cap, Take 1 capsule by mouth every 4 (four) hours as needed for Pain or Headaches., Disp: 40 capsule, Rfl: 0  •  HYDROcodone-acetaminophen 5-325 MG Oral Tab, Take 1 tablet by mouth every 6 (six) ordered as detailed in the plan of care above.

## 2021-11-10 ENCOUNTER — HOSPITAL ENCOUNTER (OUTPATIENT)
Dept: GENERAL RADIOLOGY | Age: 36
Discharge: HOME | End: 2021-11-10
Attending: PODIATRIST
Payer: MEDICAID

## 2021-11-10 ENCOUNTER — OFFICE VISIT (OUTPATIENT)
Dept: ORTHOPEDICS CLINIC | Facility: CLINIC | Age: 36
End: 2021-11-10
Payer: MEDICAID

## 2021-11-10 VITALS — OXYGEN SATURATION: 98 % | HEART RATE: 106 BPM

## 2021-11-10 DIAGNOSIS — S92.355D CLOSED NONDISPLACED FRACTURE OF FIFTH METATARSAL BONE OF LEFT FOOT WITH ROUTINE HEALING, SUBSEQUENT ENCOUNTER: ICD-10-CM

## 2021-11-10 DIAGNOSIS — S92.352D CLOSED DISPLACED FRACTURE OF FIFTH METATARSAL BONE OF LEFT FOOT WITH ROUTINE HEALING, SUBSEQUENT ENCOUNTER: Primary | ICD-10-CM

## 2021-11-10 DIAGNOSIS — F17.200 CURRENT SMOKER: ICD-10-CM

## 2021-11-10 PROCEDURE — 99213 OFFICE O/P EST LOW 20 MIN: CPT | Performed by: PODIATRIST

## 2021-11-10 PROCEDURE — 73630 X-RAY EXAM OF FOOT: CPT | Performed by: PODIATRIST

## 2021-11-10 NOTE — PROGRESS NOTES
[facility] Progress [facility]    Subjective: Janel Rinne here for follow up of base of 5th metatarsal fracture left foot, now post several weeks  Doing well  Sore feet with working out but doing better  In regular supportive athletic shoe now    Objective

## 2021-12-06 ENCOUNTER — OFFICE VISIT (OUTPATIENT)
Dept: FAMILY MEDICINE CLINIC | Facility: CLINIC | Age: 36
End: 2021-12-06
Payer: MEDICAID

## 2021-12-06 VITALS
WEIGHT: 218.13 LBS | SYSTOLIC BLOOD PRESSURE: 122 MMHG | DIASTOLIC BLOOD PRESSURE: 70 MMHG | HEART RATE: 88 BPM | HEIGHT: 62 IN | BODY MASS INDEX: 40.14 KG/M2 | RESPIRATION RATE: 16 BRPM | TEMPERATURE: 98 F

## 2021-12-06 DIAGNOSIS — H66.93 BILATERAL OTITIS MEDIA, UNSPECIFIED OTITIS MEDIA TYPE: Primary | ICD-10-CM

## 2021-12-06 DIAGNOSIS — J30.9 ALLERGIC RHINITIS, UNSPECIFIED SEASONALITY, UNSPECIFIED TRIGGER: ICD-10-CM

## 2021-12-06 PROCEDURE — 3078F DIAST BP <80 MM HG: CPT | Performed by: FAMILY MEDICINE

## 2021-12-06 PROCEDURE — 3008F BODY MASS INDEX DOCD: CPT | Performed by: FAMILY MEDICINE

## 2021-12-06 PROCEDURE — 99214 OFFICE O/P EST MOD 30 MIN: CPT | Performed by: FAMILY MEDICINE

## 2021-12-06 PROCEDURE — 3074F SYST BP LT 130 MM HG: CPT | Performed by: FAMILY MEDICINE

## 2021-12-06 NOTE — PROGRESS NOTES
HPI:   Nicole Moreno is a 43year old female that presents for f/u of bilateral ear infection from acute care clinic. Patient has had bilateral ear pain for the past several days or more.   She went to an urgent care and was prescribed amoxicill 122/70   Pulse 88   Temp 97.9 °F (36.6 °C) (Temporal)   Resp 16   Ht 5' 2\" (1.575 m)   Wt 218 lb 2 oz (98.9 kg)   LMP 09/09/2021 (LMP Unknown)   BMI 39.90 kg/m²  Estimated body mass index is 39.9 kg/m² as calculated from the following:    Height as of thi Virgil Neumann M.D. Family Medicine   12/6/2021  8:58 AM    Spent a total of 30 minutes obtaining history evaluating patient, reviewing medical chart, discussing treatment options and completing documentation.

## 2021-12-15 ENCOUNTER — OFFICE VISIT (OUTPATIENT)
Dept: FAMILY MEDICINE CLINIC | Facility: CLINIC | Age: 36
End: 2021-12-15
Payer: MEDICAID

## 2021-12-15 VITALS
TEMPERATURE: 97 F | HEART RATE: 84 BPM | HEIGHT: 62.4 IN | BODY MASS INDEX: 39.52 KG/M2 | DIASTOLIC BLOOD PRESSURE: 62 MMHG | RESPIRATION RATE: 16 BRPM | SYSTOLIC BLOOD PRESSURE: 128 MMHG | WEIGHT: 217.5 LBS

## 2021-12-15 DIAGNOSIS — H66.93 BILATERAL OTITIS MEDIA, UNSPECIFIED OTITIS MEDIA TYPE: Primary | ICD-10-CM

## 2021-12-15 PROCEDURE — 3008F BODY MASS INDEX DOCD: CPT | Performed by: FAMILY MEDICINE

## 2021-12-15 PROCEDURE — 3078F DIAST BP <80 MM HG: CPT | Performed by: FAMILY MEDICINE

## 2021-12-15 PROCEDURE — 3074F SYST BP LT 130 MM HG: CPT | Performed by: FAMILY MEDICINE

## 2021-12-15 PROCEDURE — 99213 OFFICE O/P EST LOW 20 MIN: CPT | Performed by: FAMILY MEDICINE

## 2021-12-18 NOTE — PROGRESS NOTES
HPI:   Anita Dugan is a 43year old female that presents for Patient presents with:  Ear Problem: f/up - states she feels worse - states she has pressure    She states her ears are feeling better.   She did take course of antibiotic that had given Pulse 84   Temp 97.3 °F (36.3 °C) (Temporal)   Resp 16   Ht 5' 2.4\" (1.585 m)   Wt 217 lb 8 oz (98.7 kg)   LMP 09/09/2021 (LMP Unknown)   BMI 39.27 kg/m²  Estimated body mass index is 39.27 kg/m² as calculated from the following:    Height as of this enco

## 2021-12-27 ENCOUNTER — TELEPHONE (OUTPATIENT)
Dept: FAMILY MEDICINE CLINIC | Facility: CLINIC | Age: 36
End: 2021-12-27

## 2021-12-27 RX ORDER — BUTALBITAL, ACETAMINOPHEN AND CAFFEINE 50; 325; 40 MG/1; MG/1; MG/1
1 CAPSULE ORAL EVERY 4 HOURS PRN
Qty: 40 CAPSULE | Refills: 0 | Status: SHIPPED | OUTPATIENT
Start: 2021-12-27

## 2021-12-27 NOTE — TELEPHONE ENCOUNTER
Pt states she was just seen in the office, and dr was supposed to put in a request for her medication, and she states that it was never done, and she needs it. She says its her headache medicine.  When she asked whether the dr was in today, I responded no,

## 2021-12-28 ENCOUNTER — TELEPHONE (OUTPATIENT)
Dept: FAMILY MEDICINE CLINIC | Facility: CLINIC | Age: 36
End: 2021-12-28

## 2021-12-28 NOTE — TELEPHONE ENCOUNTER
Pt would like to cancel the prescription for the headache medication. [facility] wont fill it. She was told that insurance wont cover it. Please resend to pharmacy when Dr. Yazan Burnette returns on 1-3-22.  She believes that it is not being covered due to different

## 2021-12-29 ENCOUNTER — TELEPHONE (OUTPATIENT)
Dept: FAMILY MEDICINE CLINIC | Facility: CLINIC | Age: 36
End: 2021-12-29

## 2021-12-29 NOTE — TELEPHONE ENCOUNTER
Spoke with ANDREW Rosario	[place] regarding Fioricet, there was an error on their end which has been corrected. Prescription will be covered by insurance.

## 2021-12-29 NOTE — TELEPHONE ENCOUNTER
LM informing pt of below from pharmacy. Advised pt that Rx should be ready for  later today. Asked pt to call back with any questions, call back number provided. Advised pt she may also call her pharmacy with any questions.

## 2021-12-29 NOTE — TELEPHONE ENCOUNTER
Spoke with pharmacy staff regarding pt Rx, medication was out of stock but they just received shipment and it is now in-stock. Pharmacist states pt called and asked them to \"shelf\" prescription. Pharmacist states she will \"take care of this. \"

## 2022-01-05 ENCOUNTER — VIRTUAL PHONE E/M (OUTPATIENT)
Dept: FAMILY MEDICINE CLINIC | Facility: CLINIC | Age: 37
End: 2022-01-05
Payer: MEDICAID

## 2022-01-05 DIAGNOSIS — J01.00 ACUTE NON-RECURRENT MAXILLARY SINUSITIS: Primary | ICD-10-CM

## 2022-01-05 PROCEDURE — 99212 OFFICE O/P EST SF 10 MIN: CPT | Performed by: FAMILY MEDICINE

## 2022-01-05 RX ORDER — AMOXICILLIN AND CLAVULANATE POTASSIUM 500; 125 MG/1; MG/1
1 TABLET, FILM COATED ORAL 2 TIMES DAILY
Qty: 20 TABLET | Refills: 0 | Status: SHIPPED | OUTPATIENT
Start: 2022-01-05 | End: 2022-01-15

## 2022-01-05 NOTE — PROGRESS NOTES
TELEMEDICINE VISIT by phone  This visit is conducted using Telemedicine with live, interactive audio    Verification of patient identity was established by the  patient (s)  Rodrick Carey verbally consents to a telem neomycin-polymyxin-hydrocortisone [id number] Otic Solution, 4 DROPS INTO BOTH EARS  3 TIMES A DAY UPTO 7 DAYS, Disp: 10 mL, Rfl: 0  •  ferrous sulfate 325 (65 FE) MG Oral Tab EC, Take 325 mg by mouth daily with breakfast., Disp: , Rfl:   •  Fluticasone Pr spent on reviewing labs, medications, radiology tests and decision making. Appropriate medical decision-making and tests are ordered as detailed in the plan of care above.

## 2022-01-11 ENCOUNTER — TELEPHONE (OUTPATIENT)
Dept: FAMILY MEDICINE CLINIC | Facility: CLINIC | Age: 37
End: 2022-01-11

## 2022-01-11 RX ORDER — FLUCONAZOLE 150 MG/1
150 TABLET ORAL ONCE
Qty: 1 TABLET | Refills: 0 | Status: SHIPPED | OUTPATIENT
Start: 2022-01-11 | End: 2022-01-11

## 2022-01-11 NOTE — TELEPHONE ENCOUNTER
· Pt on amoxicillin clavulanate 500-125 MG Oral Tab  · She is experience itching, discharge  · Pt is requesting if she can get a script for yeast infection    [id number] [address] AT [facility]

## 2022-01-19 ENCOUNTER — NURSE ONLY (OUTPATIENT)
Dept: OBGYN CLINIC | Facility: CLINIC | Age: 37
End: 2022-01-19
Payer: MEDICAID

## 2022-01-19 DIAGNOSIS — Z30.49 ENCOUNTER FOR SURVEILLANCE OF OTHER CONTRACEPTIVE: Primary | ICD-10-CM

## 2022-01-19 PROCEDURE — 96372 THER/PROPH/DIAG INJ SC/IM: CPT | Performed by: OBSTETRICS & GYNECOLOGY

## 2022-01-19 RX ORDER — MEDROXYPROGESTERONE ACETATE 150 MG/ML
150 INJECTION, SUSPENSION INTRAMUSCULAR ONCE
Status: COMPLETED | OUTPATIENT
Start: 2022-01-19 | End: 2022-01-19

## 2022-01-19 RX ADMIN — MEDROXYPROGESTERONE ACETATE 150 MG: 150 INJECTION, SUSPENSION INTRAMUSCULAR at 10:38:00

## 2022-01-28 ENCOUNTER — TELEPHONE (OUTPATIENT)
Dept: FAMILY MEDICINE CLINIC | Facility: CLINIC | Age: 37
End: 2022-01-28

## 2022-01-28 ENCOUNTER — VIRTUAL PHONE E/M (OUTPATIENT)
Dept: FAMILY MEDICINE CLINIC | Facility: CLINIC | Age: 37
End: 2022-01-28
Payer: MEDICAID

## 2022-01-28 DIAGNOSIS — J30.9 ALLERGIC RHINITIS, UNSPECIFIED SEASONALITY, UNSPECIFIED TRIGGER: Primary | ICD-10-CM

## 2022-01-28 PROCEDURE — 99212 OFFICE O/P EST SF 10 MIN: CPT | Performed by: FAMILY MEDICINE

## 2022-01-28 RX ORDER — METHYLPREDNISOLONE 4 MG/1
TABLET ORAL
Qty: 21 TABLET | Refills: 0 | Status: SHIPPED | OUTPATIENT
Start: 2022-01-28

## 2022-01-28 NOTE — TELEPHONE ENCOUNTER
Future Appointments   Date Time Provider Harriet Bell   1/28/2022 11:00 AM Mignon Zelaya MD [facility] [address] [facility] [address]   4/19/2022 11:15 AM EMG OB NURSE [facility] P [facility] [address]

## 2022-03-16 ENCOUNTER — TELEPHONE (OUTPATIENT)
Dept: FAMILY MEDICINE CLINIC | Facility: CLINIC | Age: 37
End: 2022-03-16

## 2022-03-16 NOTE — TELEPHONE ENCOUNTER
Patient calling, patient came in with son for his appointment. Patient requesting refills on Butalbital and Fluoxetine. Patient states doctor was going to send refills to her local pharmacy.  Please advise

## 2022-03-17 RX ORDER — BUTALBITAL, ACETAMINOPHEN AND CAFFEINE 50; 325; 40 MG/1; MG/1; MG/1
1 CAPSULE ORAL EVERY 4 HOURS PRN
Qty: 40 CAPSULE | Refills: 0 | Status: SHIPPED | OUTPATIENT
Start: 2022-03-17

## 2022-03-17 RX ORDER — FLUOXETINE HYDROCHLORIDE 40 MG/1
40 CAPSULE ORAL DAILY
Qty: 30 CAPSULE | Refills: 5 | Status: SHIPPED | OUTPATIENT
Start: 2022-03-17

## 2022-03-18 ENCOUNTER — HOSPITAL ENCOUNTER (EMERGENCY)
Age: 37
Discharge: HOME | End: 2022-03-19
Attending: EMERGENCY MEDICINE
Payer: MEDICAID

## 2022-03-18 DIAGNOSIS — R11.0 NAUSEA: ICD-10-CM

## 2022-03-18 DIAGNOSIS — R19.7 DIARRHEA, UNSPECIFIED TYPE: Primary | ICD-10-CM

## 2022-03-18 LAB
ALBUMIN SERPL-MCNC: 3.8 G/DL (ref 3.4–5)
ALBUMIN/GLOB SERPL: 1.1 {RATIO} (ref 1–2)
ALP LIVER SERPL-CCNC: 113 U/L
ALT SERPL-CCNC: 55 U/L
ANION GAP SERPL CALC-SCNC: 6 MMOL/L (ref 0–18)
AST SERPL-CCNC: 40 U/L (ref 15–37)
BASOPHILS # BLD AUTO: 0.07 X10(3) UL (ref 0–0.2)
BASOPHILS NFR BLD AUTO: 0.4 %
BILIRUB SERPL-MCNC: 0.3 MG/DL (ref 0.1–2)
BUN BLD-MCNC: 9 MG/DL (ref 7–18)
CALCIUM BLD-MCNC: 8.8 MG/DL (ref 8.5–10.1)
CHLORIDE SERPL-SCNC: 107 MMOL/L (ref 98–112)
CO2 SERPL-SCNC: 26 MMOL/L (ref 21–32)
CREAT BLD-MCNC: 0.72 MG/DL
EOSINOPHIL # BLD AUTO: 0.27 X10(3) UL (ref 0–0.7)
EOSINOPHIL NFR BLD AUTO: 1.6 %
ERYTHROCYTE [DISTWIDTH] IN BLOOD BY AUTOMATED COUNT: 13.2 %
GLOBULIN PLAS-MCNC: 3.6 G/DL (ref 2.8–4.4)
GLUCOSE BLD-MCNC: 111 MG/DL (ref 70–99)
HCT VFR BLD AUTO: 41 %
HGB BLD-MCNC: 14.4 G/DL
IMM GRANULOCYTES # BLD AUTO: 0.07 X10(3) UL (ref 0–1)
IMM GRANULOCYTES NFR BLD: 0.4 %
LYMPHOCYTES # BLD AUTO: 2.19 X10(3) UL (ref 1–4)
LYMPHOCYTES NFR BLD AUTO: 13.1 %
MCH RBC QN AUTO: 28 PG (ref 26–34)
MCHC RBC AUTO-ENTMCNC: 35.1 G/DL (ref 31–37)
MCV RBC AUTO: 79.8 FL
MONOCYTES # BLD AUTO: 0.83 X10(3) UL (ref 0.1–1)
MONOCYTES NFR BLD AUTO: 5 %
NEUTROPHILS # BLD AUTO: 13.33 X10 (3) UL (ref 1.5–7.7)
NEUTROPHILS # BLD AUTO: 13.33 X10(3) UL (ref 1.5–7.7)
NEUTROPHILS NFR BLD AUTO: 79.5 %
OSMOLALITY SERPL CALC.SUM OF ELEC: 287 MOSM/KG (ref 275–295)
PLATELET # BLD AUTO: 296 10(3)UL (ref 150–450)
POTASSIUM SERPL-SCNC: 3.5 MMOL/L (ref 3.5–5.1)
PROT SERPL-MCNC: 7.4 G/DL (ref 6.4–8.2)
RBC # BLD AUTO: 5.14 X10(6)UL
SODIUM SERPL-SCNC: 139 MMOL/L (ref 136–145)
WBC # BLD AUTO: 16.8 X10(3) UL (ref 4–11)

## 2022-03-18 PROCEDURE — 80053 COMPREHEN METABOLIC PANEL: CPT | Performed by: EMERGENCY MEDICINE

## 2022-03-18 PROCEDURE — 87046 STOOL CULTR AEROBIC BACT EA: CPT | Performed by: EMERGENCY MEDICINE

## 2022-03-18 PROCEDURE — 99284 EMERGENCY DEPT VISIT MOD MDM: CPT

## 2022-03-18 PROCEDURE — 87045 FECES CULTURE AEROBIC BACT: CPT | Performed by: EMERGENCY MEDICINE

## 2022-03-18 PROCEDURE — 87427 SHIGA-LIKE TOXIN AG IA: CPT | Performed by: EMERGENCY MEDICINE

## 2022-03-18 PROCEDURE — 96374 THER/PROPH/DIAG INJ IV PUSH: CPT

## 2022-03-18 PROCEDURE — 96361 HYDRATE IV INFUSION ADD-ON: CPT

## 2022-03-18 PROCEDURE — 85025 COMPLETE CBC W/AUTO DIFF WBC: CPT | Performed by: EMERGENCY MEDICINE

## 2022-03-18 RX ORDER — DICYCLOMINE HCL 20 MG
20 TABLET ORAL ONCE
Status: COMPLETED | OUTPATIENT
Start: 2022-03-18 | End: 2022-03-18

## 2022-03-18 RX ORDER — ONDANSETRON 2 MG/ML
4 INJECTION INTRAMUSCULAR; INTRAVENOUS ONCE
Status: COMPLETED | OUTPATIENT
Start: 2022-03-18 | End: 2022-03-18

## 2022-03-19 VITALS
BODY MASS INDEX: 36.8 KG/M2 | DIASTOLIC BLOOD PRESSURE: 67 MMHG | HEART RATE: 77 BPM | HEIGHT: 62 IN | OXYGEN SATURATION: 97 % | WEIGHT: 200 LBS | SYSTOLIC BLOOD PRESSURE: 116 MMHG | TEMPERATURE: 97 F | RESPIRATION RATE: 18 BRPM

## 2022-03-19 RX ORDER — LOPERAMIDE HYDROCHLORIDE 2 MG/1
2 TABLET ORAL AS NEEDED
Qty: 20 TABLET | Refills: 0 | Status: SHIPPED | OUTPATIENT
Start: 2022-03-19 | End: 2022-04-18

## 2022-03-19 RX ORDER — DICYCLOMINE HCL 20 MG
20 TABLET ORAL 4 TIMES DAILY PRN
Qty: 30 TABLET | Refills: 0 | Status: SHIPPED | OUTPATIENT
Start: 2022-03-19 | End: 2022-04-18

## 2022-03-19 RX ORDER — ONDANSETRON 4 MG/1
4 TABLET, ORALLY DISINTEGRATING ORAL EVERY 4 HOURS PRN
Qty: 10 TABLET | Refills: 0 | Status: SHIPPED | OUTPATIENT
Start: 2022-03-19 | End: 2022-03-26

## 2022-04-04 NOTE — TELEPHONE ENCOUNTER
Please remember, I have double book slots in my schedule, but now I can do telephone visit on Wednesday.

## 2022-04-04 NOTE — TELEPHONE ENCOUNTER
Patient wanted a VV today with VD, feels like her lungs are inflamed and maybe has bronchitis, Claritin not working, wants to be double booked, please advise.

## 2022-04-05 RX ORDER — MONTELUKAST SODIUM 10 MG/1
10 TABLET ORAL NIGHTLY
Qty: 90 TABLET | Refills: 0 | Status: SHIPPED | OUTPATIENT
Start: 2022-04-05

## 2022-04-05 NOTE — TELEPHONE ENCOUNTER
Pt went to IC last night, and was given an antibiotic-Amoxicillin. Pt would like to know if she can have a refill on the Montelukast 10 mg. This does help with her symptoms. She is completely out of the medication.   Future Appointments   Date Time Provider Harriet Bell   4/13/2022 11:00 AM Cole Robert MD [facility] [address] [facility]   4/19/2022 11:15 AM EMG OB NURSE PLFD [facility] OB[facility]N P [facility] [address]

## 2022-04-11 ENCOUNTER — TELEPHONE (OUTPATIENT)
Dept: FAMILY MEDICINE CLINIC | Facility: CLINIC | Age: 37
End: 2022-04-11

## 2022-04-11 NOTE — TELEPHONE ENCOUNTER
Pt would like to know if she could have a prescription for the Diflucan. She is having symptoms, and would like prior to her appt. Future Appointments   Date Time Provider Harriet Bell   4/13/2022 11:00 AM Rodney Pina MD [facility] [address] [facility]   4/19/2022 11:15 AM EMG OB NURSE PLFD [facility] OB/[facility]N P [facility] [address]     Pt would like this sent to local [facility].

## 2022-04-12 RX ORDER — FLUCONAZOLE 150 MG/1
150 TABLET ORAL ONCE
Qty: 1 TABLET | Refills: 0 | Status: SHIPPED | OUTPATIENT
Start: 2022-04-12 | End: 2022-04-12

## 2022-04-13 ENCOUNTER — TELEPHONE (OUTPATIENT)
Dept: FAMILY MEDICINE CLINIC | Facility: CLINIC | Age: 37
End: 2022-04-13

## 2022-04-13 ENCOUNTER — OFFICE VISIT (OUTPATIENT)
Dept: FAMILY MEDICINE CLINIC | Facility: CLINIC | Age: 37
End: 2022-04-13
Payer: MEDICAID

## 2022-04-13 VITALS
HEART RATE: 83 BPM | BODY MASS INDEX: 37.43 KG/M2 | SYSTOLIC BLOOD PRESSURE: 110 MMHG | WEIGHT: 203.38 LBS | OXYGEN SATURATION: 98 % | RESPIRATION RATE: 16 BRPM | TEMPERATURE: 98 F | HEIGHT: 62 IN | DIASTOLIC BLOOD PRESSURE: 60 MMHG

## 2022-04-13 DIAGNOSIS — J01.00 ACUTE NON-RECURRENT MAXILLARY SINUSITIS: Primary | ICD-10-CM

## 2022-04-13 PROCEDURE — 99213 OFFICE O/P EST LOW 20 MIN: CPT | Performed by: FAMILY MEDICINE

## 2022-04-13 PROCEDURE — 3008F BODY MASS INDEX DOCD: CPT | Performed by: FAMILY MEDICINE

## 2022-04-13 PROCEDURE — 3078F DIAST BP <80 MM HG: CPT | Performed by: FAMILY MEDICINE

## 2022-04-13 PROCEDURE — 3074F SYST BP LT 130 MM HG: CPT | Performed by: FAMILY MEDICINE

## 2022-04-13 NOTE — TELEPHONE ENCOUNTER
Pt called back regarding the pain in her hand and heel, states they did an x-ray and diagnosed pt with gout. Pt states she was given 5 days of prednisone and was told to follow up with her PCP. Pt states the pain/achiness in her hand never resolved and she would like a maintenance medication to prevent gout flares but she forgot to discuss that at [address] today.

## 2022-04-13 NOTE — TELEPHONE ENCOUNTER
Patient calling, patient seen today. Patient forgot to ask about poss medication for gout. Complains of gout in right hand,right heel of foot. Asking if she can take anything to control poss flare ups.  Please advise

## 2022-04-13 NOTE — TELEPHONE ENCOUNTER
I don't think that that would be gout, I do not think she needs gout prophylaxis.      If still concerned about gout, I would check with a rheumatologist first.

## 2022-04-13 NOTE — TELEPHONE ENCOUNTER
See CHIQUITA pt seen at [facility] on 3/25/22 for pain in her pinky and treated with prednisone 20 mg for 5 days for gout. Pt states she would like a medication to prevent flares, she forgot to discuss it at [address] [facility] today.

## 2022-04-13 NOTE — TELEPHONE ENCOUNTER
Advised pt of information below, pt verbalized information and will get referral information from [address]
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
## 2022-04-15 PROBLEM — H66.93 BILATERAL OTITIS MEDIA: Status: RESOLVED | Noted: 2021-01-15 | Resolved: 2022-04-15

## 2022-04-15 PROBLEM — R42 DIZZINESS: Status: RESOLVED | Noted: 2018-10-01 | Resolved: 2022-04-15

## 2022-04-15 PROBLEM — T14.8XXA COMMINUTED FRACTURE OF BONE: Status: RESOLVED | Noted: 2021-08-18 | Resolved: 2022-04-15

## 2022-04-15 PROBLEM — S92.352A CLOSED DISPLACED FRACTURE OF FIFTH METATARSAL BONE OF LEFT FOOT: Status: RESOLVED | Noted: 2021-08-18 | Resolved: 2022-04-15

## 2022-04-15 PROBLEM — S61.451D: Status: RESOLVED | Noted: 2020-11-04 | Resolved: 2022-04-15

## 2022-04-15 PROBLEM — L02.429 BOIL, THIGH: Status: RESOLVED | Noted: 2021-08-18 | Resolved: 2022-04-15

## 2022-04-15 PROBLEM — H66.90 ACUTE OTITIS MEDIA: Status: RESOLVED | Noted: 2020-02-12 | Resolved: 2022-04-15

## 2022-04-20 ENCOUNTER — NURSE ONLY (OUTPATIENT)
Dept: OBGYN CLINIC | Facility: CLINIC | Age: 37
End: 2022-04-20
Payer: MEDICAID

## 2022-04-20 VITALS
SYSTOLIC BLOOD PRESSURE: 118 MMHG | HEART RATE: 89 BPM | BODY MASS INDEX: 38 KG/M2 | WEIGHT: 209.13 LBS | DIASTOLIC BLOOD PRESSURE: 68 MMHG

## 2022-04-20 RX ADMIN — MEDROXYPROGESTERONE ACETATE 150 MG: 150 INJECTION, SUSPENSION INTRAMUSCULAR at 11:14:00

## 2022-05-04 ENCOUNTER — OFFICE VISIT (OUTPATIENT)
Dept: FAMILY MEDICINE CLINIC | Facility: CLINIC | Age: 37
End: 2022-05-04
Payer: MEDICAID

## 2022-05-04 VITALS
SYSTOLIC BLOOD PRESSURE: 110 MMHG | RESPIRATION RATE: 16 BRPM | HEIGHT: 62 IN | TEMPERATURE: 98 F | OXYGEN SATURATION: 98 % | BODY MASS INDEX: 37.24 KG/M2 | HEART RATE: 86 BPM | WEIGHT: 202.38 LBS | DIASTOLIC BLOOD PRESSURE: 70 MMHG

## 2022-05-04 DIAGNOSIS — F17.200 TOBACCO USE DISORDER: ICD-10-CM

## 2022-05-04 DIAGNOSIS — E66.9 CLASS 2 OBESITY WITH BODY MASS INDEX (BMI) OF 37.0 TO 37.9 IN ADULT, UNSPECIFIED OBESITY TYPE, UNSPECIFIED WHETHER SERIOUS COMORBIDITY PRESENT: ICD-10-CM

## 2022-05-04 DIAGNOSIS — J01.00 ACUTE NON-RECURRENT MAXILLARY SINUSITIS: ICD-10-CM

## 2022-05-04 DIAGNOSIS — Z00.00 ROUTINE PHYSICAL EXAMINATION: Primary | ICD-10-CM

## 2022-05-04 DIAGNOSIS — G43.701 CHRONIC MIGRAINE WITHOUT AURA WITH STATUS MIGRAINOSUS, NOT INTRACTABLE: ICD-10-CM

## 2022-05-04 DIAGNOSIS — J30.9 ALLERGIC RHINITIS, UNSPECIFIED SEASONALITY, UNSPECIFIED TRIGGER: ICD-10-CM

## 2022-05-04 PROCEDURE — 99395 PREV VISIT EST AGE 18-39: CPT | Performed by: FAMILY MEDICINE

## 2022-05-04 PROCEDURE — 3008F BODY MASS INDEX DOCD: CPT | Performed by: FAMILY MEDICINE

## 2022-05-04 PROCEDURE — 3078F DIAST BP <80 MM HG: CPT | Performed by: FAMILY MEDICINE

## 2022-05-04 PROCEDURE — 99213 OFFICE O/P EST LOW 20 MIN: CPT | Performed by: FAMILY MEDICINE

## 2022-05-04 PROCEDURE — 3074F SYST BP LT 130 MM HG: CPT | Performed by: FAMILY MEDICINE

## 2022-05-05 RX ORDER — BUTALBITAL, ACETAMINOPHEN AND CAFFEINE 50; 325; 40 MG/1; MG/1; MG/1
1 CAPSULE ORAL EVERY 4 HOURS PRN
Qty: 40 CAPSULE | Refills: 0 | Status: SHIPPED | OUTPATIENT
Start: 2022-05-05

## 2022-05-05 RX ORDER — FLUTICASONE PROPIONATE 50 MCG
SPRAY, SUSPENSION (ML) NASAL
Qty: 1 EACH | Refills: 3 | Status: SHIPPED | OUTPATIENT
Start: 2022-05-05

## 2022-06-02 ENCOUNTER — TELEMEDICINE (OUTPATIENT)
Dept: FAMILY MEDICINE CLINIC | Facility: CLINIC | Age: 37
End: 2022-06-02

## 2022-06-02 DIAGNOSIS — H60.12 CELLULITIS OF ANTIHELIX OF LEFT EAR: Primary | ICD-10-CM

## 2022-06-02 PROCEDURE — 99214 OFFICE O/P EST MOD 30 MIN: CPT | Performed by: FAMILY MEDICINE

## 2022-06-02 RX ORDER — FLUCONAZOLE 150 MG/1
150 TABLET ORAL ONCE
Qty: 1 TABLET | Refills: 0 | Status: SHIPPED | OUTPATIENT
Start: 2022-06-02 | End: 2022-06-02

## 2022-06-02 RX ORDER — AMOXICILLIN AND CLAVULANATE POTASSIUM 875; 125 MG/1; MG/1
1 TABLET, FILM COATED ORAL 2 TIMES DAILY
Qty: 20 TABLET | Refills: 0 | Status: SHIPPED | OUTPATIENT
Start: 2022-06-02 | End: 2022-06-12

## 2022-07-05 DIAGNOSIS — G43.701 CHRONIC MIGRAINE WITHOUT AURA WITH STATUS MIGRAINOSUS, NOT INTRACTABLE: ICD-10-CM

## 2022-07-05 DIAGNOSIS — J30.2 SEASONAL ALLERGIES: ICD-10-CM

## 2022-07-05 DIAGNOSIS — J01.90 ACUTE NON-RECURRENT SINUSITIS, UNSPECIFIED LOCATION: ICD-10-CM

## 2022-07-05 RX ORDER — MONTELUKAST SODIUM 10 MG/1
10 TABLET ORAL NIGHTLY
Qty: 90 TABLET | Refills: 1 | Status: SHIPPED | OUTPATIENT
Start: 2022-07-05

## 2022-07-05 RX ORDER — BUTALBITAL, ACETAMINOPHEN AND CAFFEINE 50; 325; 40 MG/1; MG/1; MG/1
1 CAPSULE ORAL EVERY 4 HOURS PRN
Qty: 40 CAPSULE | Refills: 1 | Status: SHIPPED | OUTPATIENT
Start: 2022-07-05

## 2022-07-05 NOTE — TELEPHONE ENCOUNTER
Pt will need refill on the Montelukast sent to local pharmacy. Pt would also like refill on the Butalbital.  Please send to local [address]. She is out of both of the meds. Pt would like to have refills placed on these prescriptions instead of monthly prescriptions.

## 2022-07-06 ENCOUNTER — TELEPHONE (OUTPATIENT)
Dept: FAMILY MEDICINE CLINIC | Facility: CLINIC | Age: 37
End: 2022-07-06

## 2022-07-06 NOTE — TELEPHONE ENCOUNTER
Received  PA request from [address] for [phone]. PA requested via Epic. Awaiting on response.

## 2022-07-20 ENCOUNTER — OFFICE VISIT (OUTPATIENT)
Dept: OBGYN CLINIC | Facility: CLINIC | Age: 37
End: 2022-07-20
Payer: MEDICAID

## 2022-07-20 VITALS
WEIGHT: 216.19 LBS | SYSTOLIC BLOOD PRESSURE: 126 MMHG | HEART RATE: 86 BPM | BODY MASS INDEX: 39.78 KG/M2 | HEIGHT: 62 IN | DIASTOLIC BLOOD PRESSURE: 84 MMHG

## 2022-07-20 DIAGNOSIS — Z30.49 ENCOUNTER FOR SURVEILLANCE OF OTHER CONTRACEPTIVE: Primary | ICD-10-CM

## 2022-07-20 DIAGNOSIS — Z01.419 WELL WOMAN EXAM WITH ROUTINE GYNECOLOGICAL EXAM: Primary | ICD-10-CM

## 2022-07-20 DIAGNOSIS — Z12.4 CERVICAL CANCER SCREENING: ICD-10-CM

## 2022-07-20 DIAGNOSIS — Z30.49 ENCOUNTER FOR SURVEILLANCE OF OTHER CONTRACEPTIVE: ICD-10-CM

## 2022-07-20 PROCEDURE — 3008F BODY MASS INDEX DOCD: CPT | Performed by: NURSE PRACTITIONER

## 2022-07-20 PROCEDURE — 3079F DIAST BP 80-89 MM HG: CPT | Performed by: NURSE PRACTITIONER

## 2022-07-20 PROCEDURE — 96372 THER/PROPH/DIAG INJ SC/IM: CPT | Performed by: NURSE PRACTITIONER

## 2022-07-20 PROCEDURE — 3074F SYST BP LT 130 MM HG: CPT | Performed by: NURSE PRACTITIONER

## 2022-07-20 PROCEDURE — 99395 PREV VISIT EST AGE 18-39: CPT | Performed by: NURSE PRACTITIONER

## 2022-07-20 RX ORDER — METHYLPREDNISOLONE 4 MG/1
TABLET ORAL
COMMUNITY
Start: 2022-07-17

## 2022-07-20 RX ORDER — FLUCONAZOLE 150 MG/1
TABLET ORAL
COMMUNITY
Start: 2022-06-02

## 2022-07-20 RX ORDER — AMOXICILLIN AND CLAVULANATE POTASSIUM 875; 125 MG/1; MG/1
TABLET, FILM COATED ORAL
COMMUNITY
Start: 2022-07-18

## 2022-07-20 RX ORDER — MEDROXYPROGESTERONE ACETATE 150 MG/ML
150 INJECTION, SUSPENSION INTRAMUSCULAR
Status: SHIPPED | OUTPATIENT
Start: 2022-07-20 | End: 2023-07-15

## 2022-07-20 RX ADMIN — MEDROXYPROGESTERONE ACETATE 150 MG: 150 INJECTION, SUSPENSION INTRAMUSCULAR at 11:31:00

## 2022-07-28 LAB — HPV I/H RISK 1 DNA SPEC QL NAA+PROBE: NEGATIVE

## 2022-08-29 DIAGNOSIS — J30.9 ALLERGIC RHINITIS, UNSPECIFIED SEASONALITY, UNSPECIFIED TRIGGER: ICD-10-CM

## 2022-08-29 RX ORDER — FLUTICASONE PROPIONATE 50 MCG
SPRAY, SUSPENSION (ML) NASAL
Qty: 16 G | Refills: 0 | Status: SHIPPED | OUTPATIENT
Start: 2022-08-29

## 2022-08-31 DIAGNOSIS — J30.9 ALLERGIC RHINITIS, UNSPECIFIED SEASONALITY, UNSPECIFIED TRIGGER: ICD-10-CM

## 2022-08-31 DIAGNOSIS — G43.701 CHRONIC MIGRAINE WITHOUT AURA WITH STATUS MIGRAINOSUS, NOT INTRACTABLE: ICD-10-CM

## 2022-08-31 RX ORDER — FLUOXETINE HYDROCHLORIDE 40 MG/1
CAPSULE ORAL
Qty: 30 CAPSULE | Refills: 5 | Status: SHIPPED | OUTPATIENT
Start: 2022-08-31

## 2022-08-31 RX ORDER — BUTALBITAL, ACETAMINOPHEN AND CAFFEINE 50; 325; 40 MG/1; MG/1; MG/1
1 CAPSULE ORAL EVERY 4 HOURS PRN
Qty: 40 CAPSULE | Refills: 1 | Status: SHIPPED | OUTPATIENT
Start: 2022-08-31

## 2022-08-31 RX ORDER — FLUTICASONE PROPIONATE 50 MCG
SPRAY, SUSPENSION (ML) NASAL
Qty: 16 G | Refills: 3 | Status: SHIPPED | OUTPATIENT
Start: 2022-08-31

## 2022-08-31 NOTE — TELEPHONE ENCOUNTER
Pt would like to have additional refills on the nasal spray. She would also like refills listed on the Butalbital, which is due for renewal 9-22-22. Please send to local [facility].

## 2022-09-10 ENCOUNTER — APPOINTMENT (OUTPATIENT)
Dept: GENERAL RADIOLOGY | Age: 37
End: 2022-09-10
Attending: EMERGENCY MEDICINE
Payer: COMMERCIAL

## 2022-09-10 ENCOUNTER — HOSPITAL ENCOUNTER (EMERGENCY)
Age: 37
Discharge: HOME | End: 2022-09-10
Attending: EMERGENCY MEDICINE
Payer: COMMERCIAL

## 2022-09-10 VITALS
HEIGHT: 62 IN | BODY MASS INDEX: 39.56 KG/M2 | HEART RATE: 86 BPM | WEIGHT: 215 LBS | DIASTOLIC BLOOD PRESSURE: 65 MMHG | SYSTOLIC BLOOD PRESSURE: 104 MMHG | TEMPERATURE: 98 F | RESPIRATION RATE: 20 BRPM | OXYGEN SATURATION: 97 %

## 2022-09-10 DIAGNOSIS — S20.211A CONTUSION OF RIGHT CHEST WALL, INITIAL ENCOUNTER: ICD-10-CM

## 2022-09-10 DIAGNOSIS — S39.012A BACK STRAIN, INITIAL ENCOUNTER: ICD-10-CM

## 2022-09-10 DIAGNOSIS — S16.1XXA STRAIN OF NECK MUSCLE, INITIAL ENCOUNTER: Primary | ICD-10-CM

## 2022-09-10 PROCEDURE — 99284 EMERGENCY DEPT VISIT MOD MDM: CPT

## 2022-09-10 PROCEDURE — 71101 X-RAY EXAM UNILAT RIBS/CHEST: CPT | Performed by: EMERGENCY MEDICINE

## 2022-09-10 PROCEDURE — 72050 X-RAY EXAM NECK SPINE 4/5VWS: CPT | Performed by: EMERGENCY MEDICINE

## 2022-09-10 PROCEDURE — 72110 X-RAY EXAM L-2 SPINE 4/>VWS: CPT | Performed by: EMERGENCY MEDICINE
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
## 2022-09-14 ENCOUNTER — HOSPITAL ENCOUNTER (OUTPATIENT)
Dept: CT IMAGING | Age: 37
Discharge: HOME | End: 2022-09-14
Attending: FAMILY MEDICINE
Payer: COMMERCIAL

## 2022-09-14 ENCOUNTER — TELEPHONE (OUTPATIENT)
Dept: FAMILY MEDICINE CLINIC | Facility: CLINIC | Age: 37
End: 2022-09-14

## 2022-09-14 ENCOUNTER — OFFICE VISIT (OUTPATIENT)
Dept: FAMILY MEDICINE CLINIC | Facility: CLINIC | Age: 37
End: 2022-09-14
Payer: COMMERCIAL

## 2022-09-14 VITALS
HEIGHT: 62 IN | SYSTOLIC BLOOD PRESSURE: 110 MMHG | TEMPERATURE: 98 F | OXYGEN SATURATION: 99 % | HEART RATE: 93 BPM | BODY MASS INDEX: 39.75 KG/M2 | DIASTOLIC BLOOD PRESSURE: 60 MMHG | WEIGHT: 216 LBS | RESPIRATION RATE: 18 BRPM

## 2022-09-14 DIAGNOSIS — S29.9XXA TRAUMATIC INJURY OF RIB: ICD-10-CM

## 2022-09-14 DIAGNOSIS — S09.90XA TRAUMATIC INJURY OF HEAD, INITIAL ENCOUNTER: ICD-10-CM

## 2022-09-14 DIAGNOSIS — R51.9 NONINTRACTABLE HEADACHE, UNSPECIFIED CHRONICITY PATTERN, UNSPECIFIED HEADACHE TYPE: ICD-10-CM

## 2022-09-14 DIAGNOSIS — S39.92XA INJURY OF LOW BACK, INITIAL ENCOUNTER: ICD-10-CM

## 2022-09-14 DIAGNOSIS — S09.90XA TRAUMATIC INJURY OF HEAD, INITIAL ENCOUNTER: Primary | ICD-10-CM

## 2022-09-14 DIAGNOSIS — S06.0X0A CONCUSSION WITHOUT LOSS OF CONSCIOUSNESS, INITIAL ENCOUNTER: ICD-10-CM

## 2022-09-14 DIAGNOSIS — S19.9XXA INJURY OF NECK, INITIAL ENCOUNTER: ICD-10-CM

## 2022-09-14 PROCEDURE — 3008F BODY MASS INDEX DOCD: CPT | Performed by: FAMILY MEDICINE

## 2022-09-14 PROCEDURE — 70450 CT HEAD/BRAIN W/O DYE: CPT | Performed by: FAMILY MEDICINE

## 2022-09-14 PROCEDURE — 99215 OFFICE O/P EST HI 40 MIN: CPT | Performed by: FAMILY MEDICINE

## 2022-09-14 PROCEDURE — 3074F SYST BP LT 130 MM HG: CPT | Performed by: FAMILY MEDICINE

## 2022-09-14 PROCEDURE — 3078F DIAST BP <80 MM HG: CPT | Performed by: FAMILY MEDICINE

## 2022-09-15 ENCOUNTER — TELEPHONE (OUTPATIENT)
Dept: FAMILY MEDICINE CLINIC | Facility: CLINIC | Age: 37
End: 2022-09-15

## 2022-09-15 PROBLEM — S09.90XA HEAD TRAUMA: Status: ACTIVE | Noted: 2022-09-15

## 2022-09-15 PROBLEM — S19.9XXA INJURY OF NECK: Status: ACTIVE | Noted: 2022-09-15

## 2022-09-15 PROBLEM — S06.0X0A CONCUSSION WITH NO LOSS OF CONSCIOUSNESS: Status: ACTIVE | Noted: 2022-09-15

## 2022-09-15 PROBLEM — S29.9XXA TRAUMATIC INJURY OF RIB: Status: ACTIVE | Noted: 2022-09-15

## 2022-09-15 PROBLEM — S39.92XA LOWER BACK INJURY: Status: ACTIVE | Noted: 2022-09-15

## 2022-09-15 PROBLEM — R51.9 NONINTRACTABLE HEADACHE: Status: ACTIVE | Noted: 2022-09-15

## 2022-10-17 ENCOUNTER — NURSE ONLY (OUTPATIENT)
Dept: OBGYN CLINIC | Facility: CLINIC | Age: 37
End: 2022-10-17
Payer: MEDICAID

## 2022-10-17 VITALS
BODY MASS INDEX: 40.67 KG/M2 | WEIGHT: 221 LBS | HEART RATE: 83 BPM | HEIGHT: 62 IN | SYSTOLIC BLOOD PRESSURE: 112 MMHG | DIASTOLIC BLOOD PRESSURE: 72 MMHG

## 2022-10-17 PROCEDURE — 3008F BODY MASS INDEX DOCD: CPT | Performed by: NURSE PRACTITIONER

## 2022-10-17 PROCEDURE — 3074F SYST BP LT 130 MM HG: CPT | Performed by: NURSE PRACTITIONER

## 2022-10-17 PROCEDURE — 3078F DIAST BP <80 MM HG: CPT | Performed by: NURSE PRACTITIONER

## 2022-10-17 PROCEDURE — 96372 THER/PROPH/DIAG INJ SC/IM: CPT | Performed by: NURSE PRACTITIONER

## 2022-10-17 RX ADMIN — MEDROXYPROGESTERONE ACETATE 150 MG: 150 INJECTION, SUSPENSION INTRAMUSCULAR at 10:13:00

## 2022-11-09 ENCOUNTER — VIRTUAL PHONE E/M (OUTPATIENT)
Dept: FAMILY MEDICINE CLINIC | Facility: CLINIC | Age: 37
End: 2022-11-09
Payer: MEDICAID

## 2022-11-09 DIAGNOSIS — J30.9 ALLERGIC RHINITIS, UNSPECIFIED SEASONALITY, UNSPECIFIED TRIGGER: Primary | ICD-10-CM

## 2022-11-09 PROCEDURE — 99443 PHONE E/M BY PHYS 21-30 MIN: CPT | Performed by: FAMILY MEDICINE

## 2022-11-28 ENCOUNTER — VIRTUAL PHONE E/M (OUTPATIENT)
Dept: FAMILY MEDICINE CLINIC | Facility: CLINIC | Age: 37
End: 2022-11-28
Payer: MEDICAID

## 2022-11-28 DIAGNOSIS — R05.1 ACUTE COUGH: ICD-10-CM

## 2022-11-28 DIAGNOSIS — J01.00 ACUTE NON-RECURRENT MAXILLARY SINUSITIS: Primary | ICD-10-CM

## 2022-11-28 PROCEDURE — 99213 OFFICE O/P EST LOW 20 MIN: CPT | Performed by: FAMILY MEDICINE

## 2022-11-28 RX ORDER — AMOXICILLIN AND CLAVULANATE POTASSIUM 875; 125 MG/1; MG/1
1 TABLET, FILM COATED ORAL 2 TIMES DAILY
Qty: 20 TABLET | Refills: 0 | Status: SHIPPED | OUTPATIENT
Start: 2022-11-28 | End: 2022-12-08

## 2022-11-28 RX ORDER — METHYLPREDNISOLONE 4 MG/1
TABLET ORAL
Qty: 21 TABLET | Refills: 0 | Status: SHIPPED | OUTPATIENT
Start: 2022-11-28

## 2022-12-13 ENCOUNTER — TELEPHONE (OUTPATIENT)
Dept: FAMILY MEDICINE CLINIC | Facility: CLINIC | Age: 37
End: 2022-12-13

## 2022-12-13 NOTE — TELEPHONE ENCOUNTER
Pt states she has been taking Prednisone and an antibiotic and she believes she has a yeast infection from the antibiotic. She wants to know if she can get the 1 time pill called in to [place] on [address] [facility] [address].

## 2022-12-14 RX ORDER — FLUCONAZOLE 150 MG/1
150 TABLET ORAL ONCE
Qty: 1 TABLET | Refills: 0 | Status: SHIPPED | OUTPATIENT
Start: 2022-12-14 | End: 2022-12-14

## 2022-12-22 DIAGNOSIS — J30.2 SEASONAL ALLERGIES: ICD-10-CM

## 2022-12-22 DIAGNOSIS — J01.90 ACUTE NON-RECURRENT SINUSITIS, UNSPECIFIED LOCATION: ICD-10-CM

## 2022-12-27 RX ORDER — MONTELUKAST SODIUM 10 MG/1
10 TABLET ORAL NIGHTLY
Qty: 90 TABLET | Refills: 1 | Status: SHIPPED | OUTPATIENT
Start: 2022-12-27

## 2022-12-27 NOTE — TELEPHONE ENCOUNTER
Requesting Montelukast 10mg  LOV: 11/28/22  RTC: prn  Last Relevant Labs:   Filled: 7/5/22 #90 with 1 refills    Future Appointments   Date Time Provider Harriet Bell   1/13/2023 10:15 AM EMG OB NURSE PLFD [facility] OB[facility] P [facility] [address]      Asthma & COPD Medication Protocol Failed 12/22/2022 03:26 PM    Asthma Action Score greater than or equal to 20    AAP/ACT given in last 12 months    Appointment in past 6 or next 3 months         Pt with no hx of asthma. Takes Montelukast for chronic sinusitis. Rx sent.

## 2023-01-02 DIAGNOSIS — G43.701 CHRONIC MIGRAINE WITHOUT AURA WITH STATUS MIGRAINOSUS, NOT INTRACTABLE: ICD-10-CM

## 2023-01-03 RX ORDER — BUTALBITAL, ACETAMINOPHEN AND CAFFEINE 50; 325; 40 MG/1; MG/1; MG/1
1 CAPSULE ORAL EVERY 4 HOURS PRN
Qty: 40 CAPSULE | Refills: 0 | Status: SHIPPED | OUTPATIENT
Start: 2023-01-03

## 2023-01-03 NOTE — TELEPHONE ENCOUNTER
Requesting Butabital  LOV: 11/28/22 TV acute  RTC: prn  Last Relevant Labs:   Filled: 8/31/22 #40 with 1 refills    Future Appointments   Date Time Provider Harriet Bell   1/13/2023 10:15 AM EMG OB NURSE [facility]FD [facility] [address]     Per [place] , last dispensed 9/24/22 #40    Rx pended and routed for approval/denial

## 2023-01-16 ENCOUNTER — NURSE ONLY (OUTPATIENT)
Dept: OBGYN CLINIC | Facility: CLINIC | Age: 38
End: 2023-01-16
Payer: MEDICAID

## 2023-01-16 VITALS
DIASTOLIC BLOOD PRESSURE: 72 MMHG | WEIGHT: 230.38 LBS | BODY MASS INDEX: 42 KG/M2 | SYSTOLIC BLOOD PRESSURE: 110 MMHG | HEART RATE: 77 BPM

## 2023-01-16 RX ORDER — ALBUTEROL SULFATE 90 UG/1
2 AEROSOL, METERED RESPIRATORY (INHALATION) EVERY 4 HOURS
COMMUNITY
Start: 2022-10-05

## 2023-01-16 RX ADMIN — MEDROXYPROGESTERONE ACETATE 150 MG: 150 INJECTION, SUSPENSION INTRAMUSCULAR at 12:25:00

## 2023-02-21 DIAGNOSIS — J30.9 ALLERGIC RHINITIS, UNSPECIFIED SEASONALITY, UNSPECIFIED TRIGGER: ICD-10-CM

## 2023-02-21 DIAGNOSIS — G43.701 CHRONIC MIGRAINE WITHOUT AURA WITH STATUS MIGRAINOSUS, NOT INTRACTABLE: ICD-10-CM

## 2023-02-21 RX ORDER — BUTALBITAL, ACETAMINOPHEN AND CAFFEINE 50; 325; 40 MG/1; MG/1; MG/1
1 CAPSULE ORAL EVERY 4 HOURS PRN
Qty: 40 CAPSULE | Refills: 0 | Status: CANCELLED | OUTPATIENT
Start: 2023-02-21

## 2023-02-21 RX ORDER — FLUTICASONE PROPIONATE 50 MCG
SPRAY, SUSPENSION (ML) NASAL
Qty: 16 G | Refills: 3 | Status: CANCELLED | OUTPATIENT
Start: 2023-02-21

## 2023-02-21 RX ORDER — FLUOXETINE HYDROCHLORIDE 40 MG/1
40 CAPSULE ORAL DAILY
Qty: 30 CAPSULE | Refills: 5 | Status: CANCELLED | OUTPATIENT
Start: 2023-02-21

## 2023-02-21 NOTE — TELEPHONE ENCOUNTER
Patient requesting refills on fluticasone propionate 50 MCG/ACT Nasal Suspension, Butalbital-APAP-Caffeine -40 MG Oral Cap and FLUOXETINE HCL 40 MG Oral Cap. Patient looking to use new local pharmacy for all refills. Patient knows Fluoxetine not yet due but will like prescription sent for pharmacy to keep on file.

## 2023-02-22 ENCOUNTER — VIRTUAL PHONE E/M (OUTPATIENT)
Dept: FAMILY MEDICINE CLINIC | Facility: CLINIC | Age: 38
End: 2023-02-22
Payer: MEDICAID

## 2023-02-22 ENCOUNTER — TELEPHONE (OUTPATIENT)
Dept: FAMILY MEDICINE CLINIC | Facility: CLINIC | Age: 38
End: 2023-02-22

## 2023-02-22 DIAGNOSIS — J30.9 ALLERGIC RHINITIS, UNSPECIFIED SEASONALITY, UNSPECIFIED TRIGGER: Primary | ICD-10-CM

## 2023-02-22 DIAGNOSIS — J01.90 ACUTE NON-RECURRENT SINUSITIS, UNSPECIFIED LOCATION: ICD-10-CM

## 2023-02-22 DIAGNOSIS — G43.701 CHRONIC MIGRAINE WITHOUT AURA WITH STATUS MIGRAINOSUS, NOT INTRACTABLE: ICD-10-CM

## 2023-02-22 DIAGNOSIS — J30.2 SEASONAL ALLERGIES: ICD-10-CM

## 2023-02-22 RX ORDER — BUTALBITAL, ACETAMINOPHEN AND CAFFEINE 50; 325; 40 MG/1; MG/1; MG/1
1 CAPSULE ORAL EVERY 4 HOURS PRN
Qty: 40 CAPSULE | Refills: 0 | Status: SHIPPED | OUTPATIENT
Start: 2023-02-22

## 2023-02-22 RX ORDER — AMOXICILLIN AND CLAVULANATE POTASSIUM 500; 125 MG/1; MG/1
1 TABLET, FILM COATED ORAL 2 TIMES DAILY
Qty: 20 TABLET | Refills: 0 | Status: SHIPPED | OUTPATIENT
Start: 2023-02-22 | End: 2023-03-04

## 2023-02-22 RX ORDER — FLUTICASONE PROPIONATE 50 MCG
SPRAY, SUSPENSION (ML) NASAL
Qty: 16 G | Refills: 3 | Status: SHIPPED | OUTPATIENT
Start: 2023-02-22

## 2023-02-22 RX ORDER — MONTELUKAST SODIUM 10 MG/1
10 TABLET ORAL NIGHTLY
Qty: 90 TABLET | Refills: 1 | Status: SHIPPED | OUTPATIENT
Start: 2023-02-22

## 2023-02-22 RX ORDER — FLUOXETINE HYDROCHLORIDE 40 MG/1
40 CAPSULE ORAL DAILY
Qty: 30 CAPSULE | Refills: 5 | Status: SHIPPED | OUTPATIENT
Start: 2023-02-22

## 2023-02-22 NOTE — TELEPHONE ENCOUNTER
Patient requesting refill for montelukast (SINGULAIR) 10 MG Oral Tab to be sent to new pharmacy. Patient will like to get this along with her other refills that were recently sent. Asking for additional refills is possible.  Please advise

## 2023-03-15 ENCOUNTER — VIRTUAL PHONE E/M (OUTPATIENT)
Dept: FAMILY MEDICINE CLINIC | Facility: CLINIC | Age: 38
End: 2023-03-15
Payer: MEDICAID

## 2023-03-15 DIAGNOSIS — J01.00 ACUTE NON-RECURRENT MAXILLARY SINUSITIS: Primary | ICD-10-CM

## 2023-03-15 DIAGNOSIS — R05.8 PRODUCTIVE COUGH: ICD-10-CM

## 2023-03-15 PROCEDURE — 99442 PHONE E/M BY PHYS 11-20 MIN: CPT | Performed by: FAMILY MEDICINE

## 2023-03-15 RX ORDER — BENZONATATE 100 MG/1
100 CAPSULE ORAL 3 TIMES DAILY PRN
Qty: 20 CAPSULE | Refills: 0 | Status: SHIPPED | OUTPATIENT
Start: 2023-03-15

## 2023-03-15 RX ORDER — AMOXICILLIN AND CLAVULANATE POTASSIUM 500; 125 MG/1; MG/1
1 TABLET, FILM COATED ORAL 2 TIMES DAILY
Qty: 20 TABLET | Refills: 0 | Status: SHIPPED | OUTPATIENT
Start: 2023-03-15 | End: 2023-03-25

## 2023-03-16 ENCOUNTER — APPOINTMENT (OUTPATIENT)
Dept: GENERAL RADIOLOGY | Age: 38
End: 2023-03-16
Attending: EMERGENCY MEDICINE
Payer: MEDICAID

## 2023-03-16 ENCOUNTER — HOSPITAL ENCOUNTER (EMERGENCY)
Age: 38
Discharge: HOME | End: 2023-03-16
Attending: EMERGENCY MEDICINE
Payer: MEDICAID

## 2023-03-16 ENCOUNTER — TELEPHONE (OUTPATIENT)
Dept: FAMILY MEDICINE CLINIC | Facility: CLINIC | Age: 38
End: 2023-03-16

## 2023-03-16 VITALS
DIASTOLIC BLOOD PRESSURE: 56 MMHG | BODY MASS INDEX: 40.48 KG/M2 | SYSTOLIC BLOOD PRESSURE: 111 MMHG | WEIGHT: 220 LBS | HEIGHT: 62 IN | TEMPERATURE: 98 F | HEART RATE: 98 BPM | RESPIRATION RATE: 16 BRPM | OXYGEN SATURATION: 97 %

## 2023-03-16 DIAGNOSIS — S83.92XA SPRAIN OF LEFT KNEE, UNSPECIFIED LIGAMENT, INITIAL ENCOUNTER: Primary | ICD-10-CM

## 2023-03-16 PROCEDURE — 73562 X-RAY EXAM OF KNEE 3: CPT | Performed by: EMERGENCY MEDICINE

## 2023-03-16 PROCEDURE — 99284 EMERGENCY DEPT VISIT MOD MDM: CPT

## 2023-03-16 PROCEDURE — 99283 EMERGENCY DEPT VISIT LOW MDM: CPT

## 2023-03-16 NOTE — TELEPHONE ENCOUNTER
Spoke to patient. States she fell 2 weeks ago and hurt her left knee. Went to [facility] and they did x-ray which showed a tear. Patient states the pain has gotten worse and swelling has increased below knee. Has had trouble walking. Denies redness/temperature change of left leg. Patient was given tramadol by [facility] and has not helped with pain. Patient has been elevating knee. Advised to apply ice prn. Patient states she is going to ER due to pain. Will obtain x-ray result from [facility]. Cancelled appt for tomorrow.

## 2023-03-16 NOTE — TELEPHONE ENCOUNTER
- Pt would like to talk with a nurse to advise if she should wait until appointment to address Severe knee pain. Pt states she is having trouble walking on the leg and severe swelling below the knee. Future Appointments   Date Time Provider Harriet Bell   3/17/2023 10:30 AM IRWIN Terrazas [facility] [address]   4/10/2023 11:15 AM EMG OB NURSE PLFD [facility] OB[facility] P [facility] [address]     Please call [phone] Leonor.

## 2023-03-16 NOTE — ED INITIAL ASSESSMENT (HPI)
Left knee pain for 2 weeks after an injury, went to MD today, sent here for follow up, patient is requesting an MRI of knee, having increased pain and swelling

## 2023-03-23 ENCOUNTER — TELEPHONE (OUTPATIENT)
Dept: FAMILY MEDICINE CLINIC | Facility: CLINIC | Age: 38
End: 2023-03-23

## 2023-03-23 RX ORDER — FLUCONAZOLE 150 MG/1
150 TABLET ORAL ONCE
Qty: 1 TABLET | Refills: 0 | Status: SHIPPED | OUTPATIENT
Start: 2023-03-23 | End: 2023-03-23

## 2023-03-23 NOTE — TELEPHONE ENCOUNTER
Pt currently on 2nd round of Augmentin and c/o yeast infection. [address] for diflucan?  Rx pended

## 2023-03-23 NOTE — TELEPHONE ENCOUNTER
Patient states she's on a second round of antibiotic and has a yeast infection, requesting a one time pill for it, please advise.

## 2023-04-05 ENCOUNTER — TELEPHONE (OUTPATIENT)
Dept: FAMILY MEDICINE CLINIC | Facility: CLINIC | Age: 38
End: 2023-04-05

## 2023-04-05 NOTE — TELEPHONE ENCOUNTER
Attempted to reach pt again by phone, unable to leave message, no voice mailbox set up. Sent [facility] for pt to call office to schedule the appt.

## 2023-04-05 NOTE — TELEPHONE ENCOUNTER
Pt states she is having cramping and pain in the lower area, possibly a female issue or hemorrhoids, she is unsure. Please advise Pt if this is an ER matter or if she needs Dr visit. Pt was offered to schedule w/Tahir Arroyo. Pt declined and wishes to hear from RN first.   Pt was informed that if a Dr visit is needed front staff would call her back to schedule.

## 2023-04-06 ENCOUNTER — APPOINTMENT (OUTPATIENT)
Dept: CT IMAGING | Age: 38
End: 2023-04-06
Attending: EMERGENCY MEDICINE
Payer: MEDICAID

## 2023-04-06 ENCOUNTER — HOSPITAL ENCOUNTER (EMERGENCY)
Age: 38
Discharge: HOME | End: 2023-04-07
Attending: EMERGENCY MEDICINE
Payer: MEDICAID

## 2023-04-06 DIAGNOSIS — N83.201 CYST OF RIGHT OVARY: ICD-10-CM

## 2023-04-06 DIAGNOSIS — R79.89 ELEVATED LFTS: ICD-10-CM

## 2023-04-06 DIAGNOSIS — K52.9 COLITIS: Primary | ICD-10-CM

## 2023-04-06 LAB
ALBUMIN SERPL-MCNC: 3.6 G/DL (ref 3.4–5)
ALBUMIN/GLOB SERPL: 1 {RATIO} (ref 1–2)
ALP LIVER SERPL-CCNC: 128 U/L
ALT SERPL-CCNC: 60 U/L
ANION GAP SERPL CALC-SCNC: 6 MMOL/L (ref 0–18)
AST SERPL-CCNC: 38 U/L (ref 15–37)
B-HCG UR QL: NEGATIVE
BASOPHILS # BLD AUTO: 0.09 X10(3) UL (ref 0–0.2)
BASOPHILS NFR BLD AUTO: 0.6 %
BILIRUB SERPL-MCNC: 0.2 MG/DL (ref 0.1–2)
BILIRUB UR QL STRIP.AUTO: NEGATIVE
BUN BLD-MCNC: 12 MG/DL (ref 7–18)
CALCIUM BLD-MCNC: 8.8 MG/DL (ref 8.5–10.1)
CHLORIDE SERPL-SCNC: 106 MMOL/L (ref 98–112)
CLARITY UR REFRACT.AUTO: CLEAR
CO2 SERPL-SCNC: 26 MMOL/L (ref 21–32)
COLOR UR AUTO: YELLOW
CREAT BLD-MCNC: 0.64 MG/DL
EOSINOPHIL # BLD AUTO: 0.26 X10(3) UL (ref 0–0.7)
EOSINOPHIL NFR BLD AUTO: 1.7 %
ERYTHROCYTE [DISTWIDTH] IN BLOOD BY AUTOMATED COUNT: 13.3 %
GFR SERPLBLD BASED ON 1.73 SQ M-ARVRAT: 115 ML/MIN/1.73M2 (ref 60–?)
GLOBULIN PLAS-MCNC: 3.5 G/DL (ref 2.8–4.4)
GLUCOSE BLD-MCNC: 95 MG/DL (ref 70–99)
GLUCOSE UR STRIP.AUTO-MCNC: NEGATIVE MG/DL
HCT VFR BLD AUTO: 37.2 %
HGB BLD-MCNC: 13.2 G/DL
IMM GRANULOCYTES # BLD AUTO: 0.05 X10(3) UL (ref 0–1)
IMM GRANULOCYTES NFR BLD: 0.3 %
KETONES UR STRIP.AUTO-MCNC: NEGATIVE MG/DL
LEUKOCYTE ESTERASE UR QL STRIP.AUTO: NEGATIVE
LIPASE SERPL-CCNC: 23 U/L (ref 13–75)
LYMPHOCYTES # BLD AUTO: 4 X10(3) UL (ref 1–4)
LYMPHOCYTES NFR BLD AUTO: 26.2 %
MCH RBC QN AUTO: 28.4 PG (ref 26–34)
MCHC RBC AUTO-ENTMCNC: 35.5 G/DL (ref 31–37)
MCV RBC AUTO: 80.2 FL
MONOCYTES # BLD AUTO: 0.69 X10(3) UL (ref 0.1–1)
MONOCYTES NFR BLD AUTO: 4.5 %
NEUTROPHILS # BLD AUTO: 10.19 X10 (3) UL (ref 1.5–7.7)
NEUTROPHILS # BLD AUTO: 10.19 X10(3) UL (ref 1.5–7.7)
NEUTROPHILS NFR BLD AUTO: 66.7 %
NITRITE UR QL STRIP.AUTO: NEGATIVE
OSMOLALITY SERPL CALC.SUM OF ELEC: 286 MOSM/KG (ref 275–295)
PH UR STRIP.AUTO: 7 [PH] (ref 5–8)
PLATELET # BLD AUTO: 294 10(3)UL (ref 150–450)
POTASSIUM SERPL-SCNC: 3.6 MMOL/L (ref 3.5–5.1)
PROT SERPL-MCNC: 7.1 G/DL (ref 6.4–8.2)
PROT UR STRIP.AUTO-MCNC: NEGATIVE MG/DL
RBC # BLD AUTO: 4.64 X10(6)UL
RBC UR QL AUTO: NEGATIVE
SODIUM SERPL-SCNC: 138 MMOL/L (ref 136–145)
SP GR UR STRIP.AUTO: 1.02 (ref 1–1.03)
UROBILINOGEN UR STRIP.AUTO-MCNC: 0.2 MG/DL
WBC # BLD AUTO: 15.3 X10(3) UL (ref 4–11)

## 2023-04-06 PROCEDURE — 85025 COMPLETE CBC W/AUTO DIFF WBC: CPT | Performed by: EMERGENCY MEDICINE

## 2023-04-06 PROCEDURE — 99285 EMERGENCY DEPT VISIT HI MDM: CPT

## 2023-04-06 PROCEDURE — 99284 EMERGENCY DEPT VISIT MOD MDM: CPT

## 2023-04-06 PROCEDURE — 74177 CT ABD & PELVIS W/CONTRAST: CPT | Performed by: EMERGENCY MEDICINE

## 2023-04-06 PROCEDURE — 81003 URINALYSIS AUTO W/O SCOPE: CPT | Performed by: EMERGENCY MEDICINE

## 2023-04-06 PROCEDURE — 96374 THER/PROPH/DIAG INJ IV PUSH: CPT

## 2023-04-06 PROCEDURE — 83690 ASSAY OF LIPASE: CPT | Performed by: EMERGENCY MEDICINE

## 2023-04-06 PROCEDURE — 80053 COMPREHEN METABOLIC PANEL: CPT | Performed by: EMERGENCY MEDICINE

## 2023-04-06 PROCEDURE — 81025 URINE PREGNANCY TEST: CPT

## 2023-04-06 RX ORDER — KETOROLAC TROMETHAMINE 15 MG/ML
15 INJECTION, SOLUTION INTRAMUSCULAR; INTRAVENOUS ONCE
Status: COMPLETED | OUTPATIENT
Start: 2023-04-06 | End: 2023-04-06

## 2023-04-07 VITALS
BODY MASS INDEX: 40.48 KG/M2 | HEIGHT: 62 IN | WEIGHT: 220 LBS | SYSTOLIC BLOOD PRESSURE: 104 MMHG | OXYGEN SATURATION: 98 % | RESPIRATION RATE: 16 BRPM | HEART RATE: 74 BPM | TEMPERATURE: 98 F | DIASTOLIC BLOOD PRESSURE: 60 MMHG

## 2023-04-07 NOTE — ED INITIAL ASSESSMENT (HPI)
Pt to ed w/ rectal pain when she coughs x5 days and diarrhea and lower abd cramping x4 days.  Denies fevers and blood in stool

## 2023-04-13 ENCOUNTER — NURSE ONLY (OUTPATIENT)
Dept: OBGYN CLINIC | Facility: CLINIC | Age: 38
End: 2023-04-13
Payer: MEDICAID

## 2023-04-13 VITALS
SYSTOLIC BLOOD PRESSURE: 124 MMHG | DIASTOLIC BLOOD PRESSURE: 76 MMHG | BODY MASS INDEX: 41.82 KG/M2 | HEART RATE: 89 BPM | WEIGHT: 227.25 LBS | HEIGHT: 62 IN

## 2023-04-13 PROCEDURE — 3008F BODY MASS INDEX DOCD: CPT | Performed by: NURSE PRACTITIONER

## 2023-04-13 PROCEDURE — 3078F DIAST BP <80 MM HG: CPT | Performed by: NURSE PRACTITIONER

## 2023-04-13 PROCEDURE — 3074F SYST BP LT 130 MM HG: CPT | Performed by: NURSE PRACTITIONER

## 2023-04-13 PROCEDURE — 96372 THER/PROPH/DIAG INJ SC/IM: CPT | Performed by: NURSE PRACTITIONER

## 2023-04-13 RX ORDER — TRAMADOL HYDROCHLORIDE 50 MG/1
50 TABLET ORAL EVERY 6 HOURS PRN
COMMUNITY
Start: 2023-03-08 | End: 2023-04-17

## 2023-04-13 RX ADMIN — MEDROXYPROGESTERONE ACETATE 150 MG: 150 INJECTION, SUSPENSION INTRAMUSCULAR at 10:22:00

## 2023-04-17 ENCOUNTER — OFFICE VISIT (OUTPATIENT)
Dept: FAMILY MEDICINE CLINIC | Facility: CLINIC | Age: 38
End: 2023-04-17
Payer: MEDICAID

## 2023-04-17 VITALS
RESPIRATION RATE: 16 BRPM | HEART RATE: 63 BPM | SYSTOLIC BLOOD PRESSURE: 118 MMHG | TEMPERATURE: 98 F | HEIGHT: 62 IN | WEIGHT: 231 LBS | DIASTOLIC BLOOD PRESSURE: 60 MMHG | BODY MASS INDEX: 42.51 KG/M2

## 2023-04-17 DIAGNOSIS — F32.A DEPRESSION, UNSPECIFIED DEPRESSION TYPE: Primary | ICD-10-CM

## 2023-04-17 DIAGNOSIS — F41.9 ANXIETY: ICD-10-CM

## 2023-04-17 PROCEDURE — 3008F BODY MASS INDEX DOCD: CPT | Performed by: FAMILY MEDICINE

## 2023-04-17 PROCEDURE — 3078F DIAST BP <80 MM HG: CPT | Performed by: FAMILY MEDICINE

## 2023-04-17 PROCEDURE — 99214 OFFICE O/P EST MOD 30 MIN: CPT | Performed by: FAMILY MEDICINE

## 2023-04-17 PROCEDURE — 3074F SYST BP LT 130 MM HG: CPT | Performed by: FAMILY MEDICINE

## 2023-04-20 DIAGNOSIS — G43.701 CHRONIC MIGRAINE WITHOUT AURA WITH STATUS MIGRAINOSUS, NOT INTRACTABLE: ICD-10-CM

## 2023-04-24 NOTE — TELEPHONE ENCOUNTER
Requesting Butabital  LOV: 4/17/23  RTC: not noted  Last Relevant Labs:   Filled: 3/24/23 #40 with 0 refills    Future Appointments   Date Time Provider Harriet Bell   7/12/2023 10:00 AM BILLY Francois [facility] OB[facility]N P [facility] [address]     Per [place] , last dispensed 3/25/23 #40    Rx pended and routed for approval/denial

## 2023-04-25 RX ORDER — BUTALBITAL, ACETAMINOPHEN AND CAFFEINE 50; 325; 40 MG/1; MG/1; MG/1
CAPSULE ORAL
Qty: 40 CAPSULE | Refills: 0 | Status: SHIPPED | OUTPATIENT
Start: 2023-04-25

## 2023-05-02 NOTE — TELEPHONE ENCOUNTER
Please advise if patient can be fit in for telephone visit today Helical Rim Advancement Flap Text: The defect edges were debeveled with a #15 blade scalpel.  Given the location of the defect and the proximity to free margins (helical rim) a double helical rim advancement flap was deemed most appropriate.  Using a sterile surgical marker, the appropriate advancement flaps were drawn incorporating the defect and placing the expected incisions between the helical rim and antihelix where possible.  The area thus outlined was incised through and through with a #15 scalpel blade.  With a skin hook and iris scissors, the flaps were gently and sharply undermined and freed up.

## 2023-06-23 DIAGNOSIS — J30.9 ALLERGIC RHINITIS, UNSPECIFIED SEASONALITY, UNSPECIFIED TRIGGER: ICD-10-CM

## 2023-06-23 DIAGNOSIS — G43.701 CHRONIC MIGRAINE WITHOUT AURA WITH STATUS MIGRAINOSUS, NOT INTRACTABLE: ICD-10-CM

## 2023-06-26 RX ORDER — FLUTICASONE PROPIONATE 50 MCG
SPRAY, SUSPENSION (ML) NASAL
Qty: 16 G | Refills: 0 | Status: SHIPPED | OUTPATIENT
Start: 2023-06-26

## 2023-06-27 ENCOUNTER — TELEPHONE (OUTPATIENT)
Dept: OBGYN CLINIC | Facility: CLINIC | Age: 38
End: 2023-06-27

## 2023-06-27 RX ORDER — BUTALBITAL, ACETAMINOPHEN AND CAFFEINE 50; 325; 40 MG/1; MG/1; MG/1
1 CAPSULE ORAL EVERY 4 HOURS PRN
Qty: 40 CAPSULE | Refills: 0 | Status: SHIPPED | OUTPATIENT
Start: 2023-06-27

## 2023-06-29 DIAGNOSIS — Z30.49 ENCOUNTER FOR SURVEILLANCE OF OTHER CONTRACEPTIVE: Primary | ICD-10-CM

## 2023-06-29 RX ORDER — MEDROXYPROGESTERONE ACETATE 150 MG/ML
150 INJECTION, SUSPENSION INTRAMUSCULAR ONCE
Status: COMPLETED | OUTPATIENT
Start: 2023-06-29 | End: 2023-06-29

## 2023-06-30 ENCOUNTER — TELEPHONE (OUTPATIENT)
Dept: FAMILY MEDICINE CLINIC | Facility: CLINIC | Age: 38
End: 2023-06-30

## 2023-07-03 NOTE — TELEPHONE ENCOUNTER
Please advise. Doxepin Counseling:  Patient advised that the medication is sedating and not to drive a car after taking this medication. Patient informed of potential adverse effects including but not limited to dry mouth, urinary retention, and blurry vision.  The patient verbalized understanding of the proper use and possible adverse effects of doxepin.  All of the patient's questions and concerns were addressed.

## 2023-07-26 DIAGNOSIS — G43.701 CHRONIC MIGRAINE WITHOUT AURA WITH STATUS MIGRAINOSUS, NOT INTRACTABLE: ICD-10-CM

## 2023-07-26 NOTE — TELEPHONE ENCOUNTER
Requesting Butabital  LOV: 4/17/23  RTC: prn  Last Relevant Labs:   Filled: 6/27/23 #40 with 0 refills    Future Appointments   Date Time Provider Harriet Bell   9/15/2023  1:00 PM Laura Pugh, IRWIN [facility] [address]     Per [place] , last dispensed 6/27/23 #40    Rx pended and routed for approval/denial

## 2023-07-27 RX ORDER — BUTALBITAL, ACETAMINOPHEN AND CAFFEINE 50; 325; 40 MG/1; MG/1; MG/1
1 CAPSULE ORAL EVERY 4 HOURS PRN
Qty: 40 CAPSULE | Refills: 0 | Status: SHIPPED | OUTPATIENT
Start: 2023-07-27

## 2023-08-03 ENCOUNTER — TELEMEDICINE (OUTPATIENT)
Dept: FAMILY MEDICINE CLINIC | Facility: CLINIC | Age: 38
End: 2023-08-03
Payer: MEDICAID

## 2023-08-03 DIAGNOSIS — R19.7 DIARRHEA, UNSPECIFIED TYPE: Primary | ICD-10-CM

## 2023-08-03 PROCEDURE — 99213 OFFICE O/P EST LOW 20 MIN: CPT | Performed by: FAMILY MEDICINE

## 2023-08-03 NOTE — PROGRESS NOTES
Subjective:   Patient ID: Kostas Edwards is a 34year old female. HPI  Ms. Fara Sebastian is a 42-year-old female presenting for video visit today she has been experiencing diarrhea since taking Augmentin for sinus infection. She has been doing better with her sinus infection. However she is unable to go to work today because of the GI symptoms that she has been experiencing including diarrhea from this antibiotic. She is requesting for me to write a letter for her to be excused today I had reviewed past medical and family histories together with allergy and medication lists documented. History/Other:   Review of Systems   Constitutional:  Negative for fatigue and fever. Gastrointestinal:  Positive for diarrhea. Negative for abdominal pain, nausea and vomiting. Current Outpatient Medications   Medication Sig Dispense Refill    Butalbital-APAP-Caffeine -40 MG Oral Cap Take 1 capsule by mouth every 4 (four) hours as needed for Pain. 40 capsule 0    buPROPion  MG Oral Tablet 24 Hr Take 1 tablet (150 mg total) by mouth daily. FLUTICASONE PROPIONATE 50 MCG/ACT Nasal Suspension SHAKE LIQUID AND USE 2 SPRAYS IN EACH NOSTRIL DAILY 16 g 0    FLUoxetine HCl 40 MG Oral Cap Take 1 capsule (40 mg total) by mouth daily. 30 capsule 5    montelukast (SINGULAIR) 10 MG Oral Tab Take 1 tablet (10 mg total) by mouth nightly. For allergies 90 tablet 1    albuterol 108 (90 Base) MCG/ACT Inhalation Aero Soln Inhale 2 puffs into the lungs every 4 (four) hours. ferrous sulfate 325 (65 FE) MG Oral Tab EC Take 1 tablet (325 mg total) by mouth daily with breakfast.       Allergies:  Antivert [Meclizine]    HIVES    Objective:   Physical Exam  Constitutional:       General: She is not in acute distress. Neurological:      Mental Status: She is alert.          Assessment & Plan:   Diarrhea, unspecified type  (primary encounter diagnosis)  -Likely side effect from Augmentin  - May take probiotic  - May take Imodium as needed  - I had written her a letter for work to be excused today. She thinks that she will be able to go back tomorrow.  -Keep hydrated    This note was prepared using [employer] voice recognition dictation software. As a result errors may occur. When identified these errors have been corrected. While every attempt is made to correct errors during dictation discrepancies may still exist          No orders of the defined types were placed in this encounter.       Meds This Visit:  Requested Prescriptions      No prescriptions requested or ordered in this encounter       Imaging & Referrals:  None

## 2023-08-11 ENCOUNTER — VIRTUAL PHONE E/M (OUTPATIENT)
Dept: FAMILY MEDICINE CLINIC | Facility: CLINIC | Age: 38
End: 2023-08-11
Payer: MEDICAID

## 2023-08-11 DIAGNOSIS — J01.10 ACUTE NON-RECURRENT FRONTAL SINUSITIS: Primary | ICD-10-CM

## 2023-08-11 PROCEDURE — 99214 OFFICE O/P EST MOD 30 MIN: CPT | Performed by: FAMILY MEDICINE

## 2023-08-11 RX ORDER — CLINDAMYCIN HYDROCHLORIDE 300 MG/1
300 CAPSULE ORAL 3 TIMES DAILY
Qty: 30 CAPSULE | Refills: 0 | Status: SHIPPED | OUTPATIENT
Start: 2023-08-11 | End: 2023-08-21

## 2023-08-11 NOTE — PROGRESS NOTES
This is a telemedicine visit with live, interactive video and/or audio. Patient understands and accepts financial responsibility for any deductible, co-insurance and/or co-pays associated with this service. SUBJECTIVE  This is a 34year old female patient states that she is having severe pain and pressure in ears and sinuses. Patient had been getting worse over the last few days. Patient has been previously been seen in walk in care for sinus infection and patient told the provider that she doesn't do well with augmentin and steroids. Patient states that she is still feeling ill and would like to try something else. HISTORY:  Past Medical History:   Diagnosis Date    Anxiety     Closed displaced fracture of fifth metatarsal bone of left foot 8/18/2021    Comminuted fracture of bone 8/18/2021    Dog bite, hand, right, subsequent encounter 11/4/2020    MIGRAINES     paralyzed Right side of face    Migraines     OTHER DISEASES     chicken pox as a child 1999      Past Surgical History:   Procedure Laterality Date    CHOLECYSTECTOMY      laproscpic 2006?     COLONOSCOPY      2002    OTHER SURGICAL HISTORY      EGD 2002    REMOVAL GALLBLADDER      TONSILLECTOMY      2002      Family History   Problem Relation Age of Onset    High Blood Pressure Father     Lipids Father     Hypertension Father     Breast Cancer Maternal Grandmother     Cancer Maternal Grandfather         prostate cancer    Asthma Mother     Diabetes Mother       Social History     Socioeconomic History    Marital status: Single   Tobacco Use    Smoking status: Every Day     Packs/day: 0.50     Types: Cigarettes    Smokeless tobacco: Never   Vaping Use    Vaping Use: Every day    Substances: Nicotine    Devices: Disposable   Substance and Sexual Activity    Alcohol use: Not Currently     Comment: very rare, (once a year)    Drug use: No    Sexual activity: Yes     Partners: Male     Birth control/protection: Injection, Depo Provera Comment: Depo    Other Topics Concern    Caffeine Concern Yes    Exercise Yes     Comment: 5 days a week    Seat Belt Yes          Antivert [Meclizine]    HIVES   Current Outpatient Medications   Medication Sig Dispense Refill    Butalbital-APAP-Caffeine -40 MG Oral Cap Take 1 capsule by mouth every 4 (four) hours as needed for Pain. 40 capsule 0    buPROPion  MG Oral Tablet 24 Hr Take 1 tablet (150 mg total) by mouth daily. FLUTICASONE PROPIONATE 50 MCG/ACT Nasal Suspension SHAKE LIQUID AND USE 2 SPRAYS IN EACH NOSTRIL DAILY 16 g 0    FLUoxetine HCl 40 MG Oral Cap Take 1 capsule (40 mg total) by mouth daily. 30 capsule 5    montelukast (SINGULAIR) 10 MG Oral Tab Take 1 tablet (10 mg total) by mouth nightly. For allergies 90 tablet 1    albuterol 108 (90 Base) MCG/ACT Inhalation Aero Soln Inhale 2 puffs into the lungs every 4 (four) hours. ferrous sulfate 325 (65 FE) MG Oral Tab EC Take 1 tablet (325 mg total) by mouth daily with breakfast.         OBJECTIVE  Physical Exam:   Physical Exam  Constitutional:       Appearance:Appears congested and does not feel well. Pulmonary:      Effort: Pulmonary effort is normal. Patient sounds congested. No coughing during visit. Neurological:      General: No focal deficit present. Mental Status: Alert and oriented to person, place, and time. Psychiatric:         Mood and Affect: Mood normal.         Behavior: Behavior normal.         Thought Content: Thought content normal.         Judgment: Judgment normal.       ASSESSMENT & PLAN  Diagnoses and all orders for this visit:    Acute non-recurrent frontal sinusitis    Recommended increased fluids/humidity  12-hour decongestant nasal spray twice daily for a maximum of 4 days  Steam inhalation for sinus pressure   OTC cold and flu medication as needed.    Robitussin or Robitussin DM as needed for cough  1 tsp honey for the cough prn    Tylenol as needed/Ibuprofen as needed, do not exceed 4000 mg of acetaminophen or 2400 mg of ibuprofen    Follow Up:  Recheck if not improved in one week or sooner if worsening. Plan discussed with patient. All questions answered. Patient is agreeable to this plan of care. Time spent on telephone call and encounter 20 min.        Karlos Oleary, APRN

## 2023-09-07 DIAGNOSIS — G43.701 CHRONIC MIGRAINE WITHOUT AURA WITH STATUS MIGRAINOSUS, NOT INTRACTABLE: ICD-10-CM

## 2023-09-07 RX ORDER — BUTALBITAL, ACETAMINOPHEN AND CAFFEINE 50; 325; 40 MG/1; MG/1; MG/1
1 CAPSULE ORAL EVERY 4 HOURS PRN
Qty: 40 CAPSULE | Refills: 0 | Status: SHIPPED | OUTPATIENT
Start: 2023-09-07

## 2023-09-07 NOTE — TELEPHONE ENCOUNTER
Medication Quantity Refills Start End   Butalbital-APAP-Caffeine -40 MG Oral Cap 40 capsule 0 7/27/2023    Sig:   Take 1 capsule by mouth every 4 (four) hours as needed for Pain. Route:   Oral     PRN Reason(s):   Pain     Order #:   [id number]       Virual visit 8/11/23  Future Appointments   Date Time Provider Harriet Bell   9/15/2023  1:00 PM Cheryl Alberts APRN [facility] P [facility] [address]      Please advise.  Thank you

## 2023-09-15 ENCOUNTER — VIRTUAL PHONE E/M (OUTPATIENT)
Dept: FAMILY MEDICINE CLINIC | Facility: CLINIC | Age: 38
End: 2023-09-15
Payer: MEDICAID

## 2023-09-15 DIAGNOSIS — J01.10 ACUTE NON-RECURRENT FRONTAL SINUSITIS: ICD-10-CM

## 2023-09-15 DIAGNOSIS — J40 BRONCHITIS WITH ACUTE WHEEZING: Primary | ICD-10-CM

## 2023-09-15 PROCEDURE — 99213 OFFICE O/P EST LOW 20 MIN: CPT | Performed by: FAMILY MEDICINE

## 2023-09-15 RX ORDER — AMOXICILLIN AND CLAVULANATE POTASSIUM 875; 125 MG/1; MG/1
1 TABLET, FILM COATED ORAL 2 TIMES DAILY
Qty: 20 TABLET | Refills: 0 | Status: SHIPPED | OUTPATIENT
Start: 2023-09-15 | End: 2023-09-25

## 2023-09-15 RX ORDER — ALBUTEROL SULFATE 90 UG/1
2 AEROSOL, METERED RESPIRATORY (INHALATION) EVERY 4 HOURS PRN
Qty: 1 EACH | Refills: 0 | Status: SHIPPED | OUTPATIENT
Start: 2023-09-15

## 2023-09-15 RX ORDER — METHYLPREDNISOLONE 4 MG/1
TABLET ORAL
Qty: 21 EACH | Refills: 0 | Status: SHIPPED | OUTPATIENT
Start: 2023-09-15

## 2023-09-20 ENCOUNTER — OFFICE VISIT (OUTPATIENT)
Dept: FAMILY MEDICINE CLINIC | Facility: CLINIC | Age: 38
End: 2023-09-20
Payer: MEDICAID

## 2023-09-20 DIAGNOSIS — T36.95XA ANTIBIOTIC-INDUCED YEAST INFECTION: Primary | ICD-10-CM

## 2023-09-20 DIAGNOSIS — B37.9 ANTIBIOTIC-INDUCED YEAST INFECTION: Primary | ICD-10-CM

## 2023-09-20 PROCEDURE — 99213 OFFICE O/P EST LOW 20 MIN: CPT | Performed by: FAMILY MEDICINE

## 2023-09-20 RX ORDER — FLUCONAZOLE 150 MG/1
150 TABLET ORAL AS DIRECTED
Qty: 3 TABLET | Refills: 0 | Status: SHIPPED | OUTPATIENT
Start: 2023-09-20

## 2023-09-20 NOTE — PROGRESS NOTES
This is a telemedicine visit with live, interactive video and/or audio. Patient understands and accepts financial responsibility for any deductible, co-insurance and/or co-pays associated with this service. SUBJECTIVE  This is a 34year old female patient has developed a yeast infection from the antibiotic she is taking. Patient states that she has had recalcitrant yeast infections in the past and the OB provider gave her three rounds of diflucan. HISTORY:  Past Medical History:   Diagnosis Date    Anxiety     Closed displaced fracture of fifth metatarsal bone of left foot 8/18/2021    Comminuted fracture of bone 8/18/2021    Dog bite, hand, right, subsequent encounter 11/4/2020    MIGRAINES     paralyzed Right side of face    Migraines     OTHER DISEASES     chicken pox as a child 1999      Past Surgical History:   Procedure Laterality Date    CHOLECYSTECTOMY      laproscpic 2006?     COLONOSCOPY      2002    OTHER SURGICAL HISTORY      EGD 2002    REMOVAL GALLBLADDER      TONSILLECTOMY      2002      Family History   Problem Relation Age of Onset    High Blood Pressure Father     Lipids Father     Hypertension Father     Breast Cancer Maternal Grandmother     Cancer Maternal Grandfather         prostate cancer    Asthma Mother     Diabetes Mother       Social History     Socioeconomic History    Marital status: Single   Tobacco Use    Smoking status: Every Day     Packs/day: .5     Types: Cigarettes    Smokeless tobacco: Never   Vaping Use    Vaping Use: Every day    Substances: Nicotine    Devices: Disposable   Substance and Sexual Activity    Alcohol use: Not Currently     Comment: very rare, (once a year)    Drug use: No    Sexual activity: Yes     Partners: Male     Birth control/protection: Injection, Depo Provera     Comment: Depo    Other Topics Concern    Caffeine Concern Yes    Exercise Yes     Comment: 5 days a week    Seat Belt Yes          Antivert [Meclizine]    HIVES   Current Outpatient Medications   Medication Sig Dispense Refill    fluconazole (DIFLUCAN) 150 MG Oral Tab Take 1 tablet (150 mg total) by mouth As Directed for 3 doses. Take one today and additional 1 in three to four days if symptoms do not resolve. 3 tablet 0    amoxicillin clavulanate 875-125 MG Oral Tab Take 1 tablet by mouth 2 (two) times daily for 10 days. Take with food to minimize upset stomach. 20 tablet 0    methylPREDNISolone (MEDROL) 4 MG Oral Tablet Therapy Pack As directed. 21 each 0    albuterol 108 (90 Base) MCG/ACT Inhalation Aero Soln Inhale 2 puffs into the lungs every 4 (four) hours as needed for Wheezing or Shortness of Breath (cough). 1 each 0    Butalbital-APAP-Caffeine -40 MG Oral Cap Take 1 capsule by mouth every 4 (four) hours as needed for Pain. 40 capsule 0    buPROPion  MG Oral Tablet 24 Hr Take 1 tablet (150 mg total) by mouth daily. FLUTICASONE PROPIONATE 50 MCG/ACT Nasal Suspension SHAKE LIQUID AND USE 2 SPRAYS IN EACH NOSTRIL DAILY 16 g 0    FLUoxetine HCl 40 MG Oral Cap Take 1 capsule (40 mg total) by mouth daily. 30 capsule 5    montelukast (SINGULAIR) 10 MG Oral Tab Take 1 tablet (10 mg total) by mouth nightly. For allergies 90 tablet 1    albuterol 108 (90 Base) MCG/ACT Inhalation Aero Soln Inhale 2 puffs into the lungs every 4 (four) hours. ferrous sulfate 325 (65 FE) MG Oral Tab EC Take 1 tablet (325 mg total) by mouth daily with breakfast.         OBJECTIVE  Physical Exam:   Phone call no PE.  ASSESSMENT & PLAN  Diagnoses and all orders for this visit:    Antibiotic-induced yeast infection  -     fluconazole (DIFLUCAN) 150 MG Oral Tab; Take 1 tablet (150 mg total) by mouth As Directed for 3 doses. Take one today and additional 1 in three to four days if symptoms do not resolve. Call with persistent symptoms. Follow Up:  No follow-ups on file. Plan discussed with patient. All questions answered. Patient is agreeable to this plan of care.      Time spent on telephone call and encounter 15 min.        IRWIN Gonzalez

## 2023-09-26 ENCOUNTER — OFFICE VISIT (OUTPATIENT)
Dept: OBGYN CLINIC | Facility: CLINIC | Age: 38
End: 2023-09-26
Payer: MEDICAID

## 2023-09-26 VITALS
SYSTOLIC BLOOD PRESSURE: 106 MMHG | DIASTOLIC BLOOD PRESSURE: 72 MMHG | WEIGHT: 225.63 LBS | BODY MASS INDEX: 41 KG/M2 | HEART RATE: 82 BPM

## 2023-09-26 DIAGNOSIS — Z01.419 WELL WOMAN EXAM WITH ROUTINE GYNECOLOGICAL EXAM: Primary | ICD-10-CM

## 2023-09-26 DIAGNOSIS — Z30.42 ENCOUNTER FOR SURVEILLANCE OF INJECTABLE CONTRACEPTIVE: ICD-10-CM

## 2023-09-26 DIAGNOSIS — Z12.31 ENCOUNTER FOR SCREENING MAMMOGRAM FOR MALIGNANT NEOPLASM OF BREAST: ICD-10-CM

## 2023-09-26 PROCEDURE — 96372 THER/PROPH/DIAG INJ SC/IM: CPT | Performed by: NURSE PRACTITIONER

## 2023-09-26 PROCEDURE — 3074F SYST BP LT 130 MM HG: CPT | Performed by: NURSE PRACTITIONER

## 2023-09-26 PROCEDURE — 99396 PREV VISIT EST AGE 40-64: CPT | Performed by: NURSE PRACTITIONER

## 2023-09-26 PROCEDURE — 3078F DIAST BP <80 MM HG: CPT | Performed by: NURSE PRACTITIONER

## 2023-09-26 RX ORDER — LAMOTRIGINE 25 MG/1
25 TABLET ORAL DAILY
COMMUNITY

## 2023-09-26 RX ORDER — MEDROXYPROGESTERONE ACETATE 150 MG/ML
150 INJECTION, SUSPENSION INTRAMUSCULAR
Status: SHIPPED | OUTPATIENT
Start: 2023-09-26 | End: 2024-09-20

## 2023-09-26 RX ADMIN — MEDROXYPROGESTERONE ACETATE 150 MG: 150 INJECTION, SUSPENSION INTRAMUSCULAR at 09:11:00

## 2023-10-26 ENCOUNTER — VIRTUAL PHONE E/M (OUTPATIENT)
Dept: FAMILY MEDICINE CLINIC | Facility: CLINIC | Age: 38
End: 2023-10-26

## 2023-10-26 DIAGNOSIS — J32.9 CHRONIC SINUSITIS, UNSPECIFIED LOCATION: Primary | ICD-10-CM

## 2023-10-26 RX ORDER — FLUCONAZOLE 150 MG/1
150 TABLET ORAL ONCE
Qty: 1 TABLET | Refills: 0 | Status: SHIPPED | OUTPATIENT
Start: 2023-10-26 | End: 2023-10-26

## 2023-10-26 RX ORDER — AMOXICILLIN AND CLAVULANATE POTASSIUM 875; 125 MG/1; MG/1
1 TABLET, FILM COATED ORAL 2 TIMES DAILY
Qty: 20 TABLET | Refills: 0 | Status: SHIPPED | OUTPATIENT
Start: 2023-10-26 | End: 2023-11-05

## 2023-10-26 RX ORDER — MONTELUKAST SODIUM 10 MG/1
10 TABLET ORAL DAILY
Qty: 90 TABLET | Refills: 3 | Status: SHIPPED | OUTPATIENT
Start: 2023-10-26 | End: 2024-10-20

## 2023-10-26 NOTE — PROGRESS NOTES
Virtual Telephone Check-In    Devin Amador verbally consents to a Virtual/Telephone Check-In visit on 10/26/23. Patient has been referred to the [facility] website at [email or web address] to review the yearly Consent to Treat document. Patient understands and accepts financial responsibility for any deductible, co-insurance and/or co-pays associated with this service.     Duration of the service: 15 minutes      Summary of topics discussed:             Maricarmen Stapleton MD

## 2023-10-26 NOTE — PROGRESS NOTES
Subjective:   Patient ID: Antonio Lawrence is a 34year old female. HPI  Ms. Dahlia Garcia is a pleasant 35 y/o F      History/Other:   Review of Systems   Constitutional:  Negative for fatigue and fever. HENT:  Positive for congestion and sore throat. Negative for trouble swallowing. Respiratory:  Positive for cough. Negative for shortness of breath. Cardiovascular:  Negative for chest pain, palpitations and leg swelling. Gastrointestinal:  Negative for abdominal pain, diarrhea, nausea and vomiting. Neurological:  Negative for dizziness. Current Outpatient Medications   Medication Sig Dispense Refill    amoxicillin clavulanate 875-125 MG Oral Tab Take 1 tablet by mouth 2 (two) times daily for 10 days. 20 tablet 0    montelukast (SINGULAIR) 10 MG Oral Tab Take 1 tablet (10 mg total) by mouth daily. 90 tablet 3    fluconazole (DIFLUCAN) 150 MG Oral Tab Take 1 tablet (150 mg total) by mouth once for 1 dose. 1 tablet 0    lamoTRIgine 25 MG Oral Tab Take 1 tablet (25 mg total) by mouth daily. fluconazole (DIFLUCAN) 150 MG Oral Tab Take 1 tablet (150 mg total) by mouth As Directed for 3 doses. Take one today and additional 1 in three to four days if symptoms do not resolve. 3 tablet 0    albuterol 108 (90 Base) MCG/ACT Inhalation Aero Soln Inhale 2 puffs into the lungs every 4 (four) hours as needed for Wheezing or Shortness of Breath (cough). 1 each 0    Butalbital-APAP-Caffeine -40 MG Oral Cap Take 1 capsule by mouth every 4 (four) hours as needed for Pain. 40 capsule 0    buPROPion  MG Oral Tablet 24 Hr Take 1 tablet (150 mg total) by mouth daily. FLUTICASONE PROPIONATE 50 MCG/ACT Nasal Suspension SHAKE LIQUID AND USE 2 SPRAYS IN EACH NOSTRIL DAILY 16 g 0    FLUoxetine HCl 40 MG Oral Cap Take 1 capsule (40 mg total) by mouth daily. 30 capsule 5    albuterol 108 (90 Base) MCG/ACT Inhalation Aero Soln Inhale 2 puffs into the lungs every 4 (four) hours.       ferrous sulfate 325 (65 FE) MG Oral Tab EC Take 1 tablet (325 mg total) by mouth daily with breakfast.       Allergies:  Antivert [Meclizine]    HIVES    Objective:   Physical Exam  Constitutional:       General: She is not in acute distress. Neurological:      Mental Status: She is alert. Assessment & Plan:   Chronic sinusitis, unspecified location  (primary encounter diagnosis)  -refill montelukast  Keep hydrated  May take Tylenol or ibuprofen as needed for fever pain  - We will going to treat with Augmentin which was sent to her pharmacy  - Call or come in sooner if symptoms worsen or persist.    This note was prepared using HandInScan voice recognition dictation software. As a result errors may occur. When identified these errors have been corrected. While every attempt is made to correct errors during dictation discrepancies may still exist          No orders of the defined types were placed in this encounter. Meds This Visit:  Requested Prescriptions     Signed Prescriptions Disp Refills    amoxicillin clavulanate 875-125 MG Oral Tab 20 tablet 0     Sig: Take 1 tablet by mouth 2 (two) times daily for 10 days. montelukast (SINGULAIR) 10 MG Oral Tab 90 tablet 3     Sig: Take 1 tablet (10 mg total) by mouth daily. fluconazole (DIFLUCAN) 150 MG Oral Tab 1 tablet 0     Sig: Take 1 tablet (150 mg total) by mouth once for 1 dose.        Imaging & Referrals:  None

## 2023-10-26 NOTE — PATIENT INSTRUCTIONS
Thank you for choosing Maricarmen Stapleton MD at [place] [address]  To Do: Devin Amador  1. Please take meds as directed. [facility] is located in [address]. Monday, Tuesday & Friday - 8 a.m. to 4 p.m. Wednesday, Thursday - 7 a.m. to 3 p.m. The lab is closed daily from 12 p.m.-12:30 p.m. Saturday lab hours by appointment. Call [phone] to schedule the appointment. Please signup for Nextt, which is electronic access to your record if you have not done so. All your results will post on there. [email or web address]   You can NOW use Nextt to book your appointments with us, or consider using open access scheduling which is through the [facility] website [email or web address] and type in Maricarmen Stapleton MD and follow the links for \"Schedule Online Now\"    To schedule Imaging or tests at [facility] Scheduling [phone], Go to [facility] A ER Building (For example: CT scans, X rays, Ultrasound, MRI)  Cardiac Testing in ER building Building A second floor Cardiac Testing [phone] (For example: Holter Monitor, Cardiac Stress tests,Event Monitor, or 2D Echocardiograms)  [facility] call [phone] usually in dg A  Walk in Clinic in [place] at [facility]. [address] Mon-Fri at 8am-7:30 p.m., and Sat/Sun 9:00a. m.-4:30 p.m. Also at [address]  Call [phone] for info     Please call our office about any questions regarding your treatment/medicines/tests as a result of today's visit. For your safety, read the entire package insert of all medicines prescribed to you and be aware of all of the risks of treatment even beyond those discussed today. All therapies have potential risk of harm or side effects or medication interactions.   It is your duty and for your safety to discuss with the pharmacist and our office with questions, and to notify us and stop treatment if problems arise, but know that our intention is that the benefits outweigh those potential risks and we strive to make you healthier and to improve your quality of life. Referrals, and Radiology Information:    If your insurance requires a referral to a specialist, please allow 5 business days to process your referral request.    If Oscar Carrizales MD orders a CT or MRI, it may take up to 10 business days to receive approval from your insurance company. Once our office has called informing you that the insurance company approved your testing, please call Central Scheduling at [phone]  Please allow our office 5 business days to contact you regarding any testing results. Refill policies:   Allow 3 business days for refills; controlled substances may take longer and must be picked up from the office in person. Narcotic medications can only be filled in 30 day increments and must be refilled at an office visit only. If your prescription is due for a refill, you may be due for a follow-up appointment. We cannot refill your maintenance medications at a preventative wellness visit. To best provide you care, patients receiving maintenance medications need to be seen at least twice a year.

## 2023-11-22 DIAGNOSIS — G43.701 CHRONIC MIGRAINE WITHOUT AURA WITH STATUS MIGRAINOSUS, NOT INTRACTABLE: ICD-10-CM

## 2023-11-22 RX ORDER — BUTALBITAL, ACETAMINOPHEN AND CAFFEINE 50; 325; 40 MG/1; MG/1; MG/1
1 CAPSULE ORAL EVERY 4 HOURS PRN
Qty: 40 CAPSULE | Refills: 0 | Status: SHIPPED | OUTPATIENT
Start: 2023-11-22

## 2023-11-22 NOTE — TELEPHONE ENCOUNTER
Pt would like a refill on the Butalbital for her headaches. She has one left. She would like sent to local [address] [place] [address].

## 2023-11-30 ENCOUNTER — TELEMEDICINE (OUTPATIENT)
Dept: FAMILY MEDICINE CLINIC | Facility: CLINIC | Age: 38
End: 2023-11-30
Payer: MEDICAID

## 2023-11-30 DIAGNOSIS — J01.90 ACUTE SINUSITIS, RECURRENCE NOT SPECIFIED, UNSPECIFIED LOCATION: Primary | ICD-10-CM

## 2023-11-30 PROCEDURE — 99213 OFFICE O/P EST LOW 20 MIN: CPT | Performed by: FAMILY MEDICINE

## 2023-11-30 RX ORDER — AMOXICILLIN AND CLAVULANATE POTASSIUM 875; 125 MG/1; MG/1
1 TABLET, FILM COATED ORAL 2 TIMES DAILY
Qty: 20 TABLET | Refills: 0 | Status: SHIPPED | OUTPATIENT
Start: 2023-11-30 | End: 2023-12-10

## 2023-11-30 NOTE — PROGRESS NOTES
Subjective:   Patient ID: Colt Edwards is a 34year old female. HPI  Ms. Tree Suarez is a pleasant 64-year-old female presenting for for video visit today for the past few days she has been experiencing hoarseness which seem to have gotten better associated with headaches and congestion and cough. She and her youngest son was in an event a few days ago. No fever no shortness of breath no nausea no vomiting no abdominal pain. She gets frequent sinusitis each year. I had reviewed past medical and family histories together with allergy and medication lists documented. History/Other:   Review of Systems   Constitutional:  Positive for fatigue. Negative for fever. HENT:  Positive for congestion, sinus pain and sore throat. Respiratory:  Positive for cough. Negative for shortness of breath. Cardiovascular:  Negative for chest pain. Gastrointestinal:  Negative for abdominal pain, diarrhea, nausea and vomiting. Current Outpatient Medications   Medication Sig Dispense Refill    amoxicillin clavulanate 875-125 MG Oral Tab Take 1 tablet by mouth 2 (two) times daily for 10 days. 20 tablet 0    Butalbital-APAP-Caffeine -40 MG Oral Cap Take 1 capsule by mouth every 4 (four) hours as needed for Pain. 40 capsule 0    montelukast (SINGULAIR) 10 MG Oral Tab Take 1 tablet (10 mg total) by mouth daily. 90 tablet 3    lamoTRIgine 25 MG Oral Tab Take 1 tablet (25 mg total) by mouth daily. fluconazole (DIFLUCAN) 150 MG Oral Tab Take 1 tablet (150 mg total) by mouth As Directed for 3 doses. Take one today and additional 1 in three to four days if symptoms do not resolve. 3 tablet 0    albuterol 108 (90 Base) MCG/ACT Inhalation Aero Soln Inhale 2 puffs into the lungs every 4 (four) hours as needed for Wheezing or Shortness of Breath (cough). 1 each 0    buPROPion  MG Oral Tablet 24 Hr Take 1 tablet (150 mg total) by mouth daily.       FLUTICASONE PROPIONATE 50 MCG/ACT Nasal Suspension SHAKE LIQUID AND USE 2 SPRAYS IN EACH NOSTRIL DAILY 16 g 0    FLUoxetine HCl 40 MG Oral Cap Take 1 capsule (40 mg total) by mouth daily. 30 capsule 5    albuterol 108 (90 Base) MCG/ACT Inhalation Aero Soln Inhale 2 puffs into the lungs every 4 (four) hours. ferrous sulfate 325 (65 FE) MG Oral Tab EC Take 1 tablet (325 mg total) by mouth daily with breakfast.       Allergies: Allergies   Allergen Reactions    Antivert [Meclizine] HIVES       Objective:   Physical Exam  Constitutional:       General: She is not in acute distress. Neurological:      Mental Status: She is alert. Assessment & Plan:   1. Acute sinusitis, recurrence not specified, unspecified location    -Keep hydrated  - May take Tylenol as needed for fever pain  - We will treat with Augmentin which was sent to her pharmacy  - Will supplement with Diflucan just in case she gets yeast infection    Call or come in sooner if symptoms worsen or persist    This note was prepared using Zuberance voice recognition dictation software. As a result errors may occur. When identified these errors have been corrected. While every attempt is made to correct errors during dictation discrepancies may still exist            No orders of the defined types were placed in this encounter. Meds This Visit:  Requested Prescriptions     Signed Prescriptions Disp Refills    amoxicillin clavulanate 875-125 MG Oral Tab 20 tablet 0     Sig: Take 1 tablet by mouth 2 (two) times daily for 10 days.        Imaging & Referrals:  None

## 2023-12-19 ENCOUNTER — NURSE ONLY (OUTPATIENT)
Dept: OBGYN CLINIC | Facility: CLINIC | Age: 38
End: 2023-12-19
Payer: MEDICAID

## 2023-12-19 VITALS
BODY MASS INDEX: 42.53 KG/M2 | HEART RATE: 89 BPM | DIASTOLIC BLOOD PRESSURE: 70 MMHG | SYSTOLIC BLOOD PRESSURE: 112 MMHG | WEIGHT: 231.13 LBS | HEIGHT: 62 IN

## 2023-12-19 PROCEDURE — 96372 THER/PROPH/DIAG INJ SC/IM: CPT | Performed by: NURSE PRACTITIONER

## 2023-12-19 PROCEDURE — 3008F BODY MASS INDEX DOCD: CPT | Performed by: NURSE PRACTITIONER

## 2023-12-19 PROCEDURE — 3074F SYST BP LT 130 MM HG: CPT | Performed by: NURSE PRACTITIONER

## 2023-12-19 PROCEDURE — 3078F DIAST BP <80 MM HG: CPT | Performed by: NURSE PRACTITIONER

## 2023-12-19 RX ADMIN — MEDROXYPROGESTERONE ACETATE 150 MG: 150 INJECTION, SUSPENSION INTRAMUSCULAR at 16:10:00

## 2023-12-19 NOTE — PROGRESS NOTES
Pt was given depo shot in left deltoid. Pt tolerated well. Pt was given reminder care for March 6th- March 20th.

## 2023-12-29 ENCOUNTER — HOSPITAL ENCOUNTER (EMERGENCY)
Age: 38
Discharge: LEFT BEFORE DISCHARGE | End: 2023-12-30
Attending: EMERGENCY MEDICINE
Payer: MEDICAID

## 2023-12-29 VITALS
RESPIRATION RATE: 20 BRPM | TEMPERATURE: 98 F | OXYGEN SATURATION: 99 % | SYSTOLIC BLOOD PRESSURE: 127 MMHG | WEIGHT: 230 LBS | HEART RATE: 89 BPM | DIASTOLIC BLOOD PRESSURE: 67 MMHG | BODY MASS INDEX: 40.75 KG/M2 | HEIGHT: 63 IN

## 2023-12-29 RX ORDER — ARIPIPRAZOLE 2 MG/1
2 TABLET ORAL DAILY
COMMUNITY
Start: 2023-12-15

## 2024-01-14 ENCOUNTER — HOSPITAL ENCOUNTER (EMERGENCY)
Age: 39
Discharge: HOME | End: 2024-01-14
Payer: COMMERCIAL

## 2024-01-14 VITALS
DIASTOLIC BLOOD PRESSURE: 69 MMHG | SYSTOLIC BLOOD PRESSURE: 120 MMHG | HEART RATE: 99 BPM | WEIGHT: 230 LBS | OXYGEN SATURATION: 99 % | BODY MASS INDEX: 42.33 KG/M2 | TEMPERATURE: 98 F | RESPIRATION RATE: 16 BRPM | HEIGHT: 62 IN

## 2024-01-14 DIAGNOSIS — M72.2 PLANTAR FASCIITIS OF RIGHT FOOT: Primary | ICD-10-CM

## 2024-01-14 PROCEDURE — 99282 EMERGENCY DEPT VISIT SF MDM: CPT

## 2024-01-14 PROCEDURE — 99283 EMERGENCY DEPT VISIT LOW MDM: CPT

## 2024-01-15 NOTE — DISCHARGE INSTRUCTIONS
- Alternate ice / heat as tolerated   - Drink plenty of water (approx. 4-6 bottle equivalents per day)   - Naproxen, Ibuprofen, or Tylenol for pain as needed   - You may benefit from over-the-counter topical pain medication such as: Diclofenac (Voltaren), Icy/Hot, Biofreeze, Bengay, etc.   - You may benefit from massage, acupuncture, and/or dry needling   - You may benefit from plantar fascia scraping (review online)   - You may benefit from Epson salt warm water soaks throughout the day   - Avoid excessive standing / walking / pressure to foot to allow healing   - Follow up with Podiatry if symptoms persist

## 2024-01-15 NOTE — ED PROVIDER NOTES
History     Chief Complaint   Patient presents with    Leg or Foot Injury       Subjective:   HPI    Mariantaryn Mora, 38 year old female with notable medical history of n/a who presents with Right foot pain. Patient reports stepping on her dog's rawhide 1.5-2 months ago and having persistent pain to her Right plantar foot that is not resolving. Patient does reports being on her feet all day every day and has not allowed the area to rest. Denies other types of injury to her Right foot.        Objective:   Past Medical History:   Diagnosis Date    Anxiety     Closed displaced fracture of fifth metatarsal bone of left foot 8/18/2021    Comminuted fracture of bone 8/18/2021    Dog bite, hand, right, subsequent encounter 11/4/2020    MIGRAINES     paralyzed Right side of face    Migraines     OTHER DISEASES     chicken pox as a child 1999              Past Surgical History:   Procedure Laterality Date    CHOLECYSTECTOMY      laproscpic 2006?    COLONOSCOPY      2002    OTHER SURGICAL HISTORY      EGD 2002    REMOVAL GALLBLADDER      TONSILLECTOMY      2002                Social History     Socioeconomic History    Marital status: Single   Tobacco Use    Smoking status: Every Day     Packs/day: .5     Types: Cigarettes    Smokeless tobacco: Never   Vaping Use    Vaping Use: Every day    Substances: Nicotine    Devices: Disposable   Substance and Sexual Activity    Alcohol use: Not Currently     Comment: very rare, (once a year)    Drug use: No    Sexual activity: Yes     Partners: Male     Birth control/protection: Injection, Depo Provera     Comment: Depo    Other Topics Concern    Caffeine Concern Yes    Exercise Yes     Comment: 5 days a week    Seat Belt Yes              Review of Systems   Musculoskeletal:         Right foot pain   All other systems reviewed and are negative.        Constitutional and vital signs reviewed.      All other systems reviewed and negative except as noted above.    I have  reviewed the family history, social history, allergies, and outpatient medications.     History reviewed from EMR: Encounters, problem list, allergies, medications      Physical Exam     ED Triage Vitals [01/14/24 1846]   /69   Pulse 99   Resp 16   Temp 98.2 °F (36.8 °C)   Temp src Temporal   SpO2 99 %   O2 Device None (Room air)       Current:/69   Pulse 99   Temp 98.2 °F (36.8 °C) (Temporal)   Resp 16   Ht 157.5 cm (5' 2\")   Wt 104.3 kg   LMP 12/12/2023 (Approximate)   SpO2 99%   BMI 42.07 kg/m²       Physical Exam  Vitals and nursing note reviewed.   Constitutional:       General: She is not in acute distress.     Appearance: Normal appearance. She is normal weight. She is not ill-appearing or toxic-appearing.   HENT:      Head: Normocephalic and atraumatic.      Right Ear: External ear normal.      Left Ear: External ear normal.      Nose: Nose normal.      Mouth/Throat:      Mouth: Mucous membranes are moist.   Eyes:      Extraocular Movements: Extraocular movements intact.      Conjunctiva/sclera: Conjunctivae normal.      Pupils: Pupils are equal, round, and reactive to light.   Cardiovascular:      Rate and Rhythm: Normal rate.      Pulses: Normal pulses.   Pulmonary:      Effort: Pulmonary effort is normal. No respiratory distress.   Musculoskeletal:         General: No swelling, tenderness or signs of injury. Normal range of motion.      Cervical back: Normal range of motion and neck supple.   Feet:      Comments: Tenderness to fascia of Right plantar foot with notable adhesions.  Skin:     General: Skin is warm and dry.      Capillary Refill: Capillary refill takes less than 2 seconds.      Coloration: Skin is not jaundiced.   Neurological:      General: No focal deficit present.      Mental Status: She is alert and oriented to person, place, and time. Mental status is at baseline.   Psychiatric:         Mood and Affect: Mood normal.         Behavior: Behavior normal.         Thought  Content: Thought content normal.         Judgment: Judgment normal.            ED Course     Labs Reviewed - No data to display  No orders to display       Vitals:    01/14/24 1846   BP: 120/69   Pulse: 99   Resp: 16   Temp: 98.2 °F (36.8 °C)   TempSrc: Temporal   SpO2: 99%   Weight: 104.3 kg   Height: 157.5 cm (5' 2\")            [place]        Marian Mora, 38 year old female with medical history as noted above who presents with Right foot pain   - PATIENT in NAD, VSS   - HPI and exam c/w fascia etiology   - Given exam and HPI, Xrays unlikely to provide additional diagnostic benefit at this time.   - Supportive care discussed   - Podiatry referral provided        ** See ED course for additional information on care provided / interventions / notable events throughout patient's encounter.    ** See below for home care instructions (if applicable)         I have independently reviewed the radiology images, clinical lab results, and ECG tracings as described above (if applicable)        Medical Decision Making      Disposition and Plan     Clinical Impression:  1. Plantar fasciitis of right foot         Disposition:  Discharge  1/14/2024  7:12 pm    Follow-up:  Marian Dow, DPM  [address] [place] [id number]  [phone]    Follow up  Podiatry referal (if needed)          Medications Prescribed:  Current Discharge Medication List          The above patient (and/or guardian) was made aware that an appropriate evaluation has been performed, and that no additional testing is required at this time. In my medical judgment, there is currently no evidence of an immediate life-threatening or surgical condition, therefore discharge is indicated at this time. The patient (and/or guardian) was advised that a small risk still exists that a serious condition could develop. The patient was instructed to arrange close follow-up with their primary care provider (or the referral provider given today). The  patient received written and verbal instructions regarding their condition / concerns, demonstrated understanding, and is agreement with the outpatient treatment plan.        Home care instructions:       - Alternate ice / heat as tolerated   - Drink plenty of water (approx. 4-6 bottle equivalents per day)   - Naproxen, Ibuprofen, or Tylenol for pain as needed   - You may benefit from over-the-counter topical pain medication such as: Diclofenac (Voltaren), Icy/Hot, Biofreeze, Bengay, etc.   - You may benefit from massage, acupuncture, and/or dry needling   - You may benefit from plantar fascia scraping (review online)   - You may benefit from Epson salt warm water soaks throughout the day   - Avoid excessive standing / walking / pressure to foot to allow healing   - Follow up with Podiatry if symptoms persist      Stanley Ortega, DNP, APRN, AGACNP-BC, FNP-C, CNL  Adult-Gerontology Acute Care & Family Nurse Practitioner  [facility]

## 2024-01-15 NOTE — ED INITIAL ASSESSMENT (HPI)
Pt reports rt foot pain. States she was seen two days ago at  and she feels like they mixed the xrays up between her and her son. States she can walk on it but it causes pain.

## 2024-02-29 ENCOUNTER — HOSPITAL ENCOUNTER (EMERGENCY)
Age: 39
Discharge: LEFT BEFORE DISCHARGE | End: 2024-03-01
Payer: MEDICAID

## 2024-02-29 VITALS
DIASTOLIC BLOOD PRESSURE: 69 MMHG | OXYGEN SATURATION: 98 % | RESPIRATION RATE: 16 BRPM | HEIGHT: 62 IN | WEIGHT: 230 LBS | HEART RATE: 86 BPM | SYSTOLIC BLOOD PRESSURE: 113 MMHG | BODY MASS INDEX: 42.33 KG/M2 | TEMPERATURE: 98 F

## 2024-03-01 NOTE — ED QUICK NOTES
RN was called into the patient's room, pt stated \"I've been here waiting for over an hour, when is the doctor going to come see me?\". RN informed patient the MD will be making his rounds, offered pain meds. Pt verbalized she was going to leave.

## 2024-03-01 NOTE — ED INITIAL ASSESSMENT (HPI)
Pt c/o right foot pain x 1.5 months. States has seen multiple doctors. States some say plantar fascitis, some say \"its nothing\" Pt states continues to have pain.

## 2024-03-25 ENCOUNTER — TELEPHONE (OUTPATIENT)
Dept: OBGYN CLINIC | Facility: CLINIC | Age: 39
End: 2024-03-25

## 2024-03-25 DIAGNOSIS — Z30.42 ENCOUNTER FOR SURVEILLANCE OF INJECTABLE CONTRACEPTIVE: Primary | ICD-10-CM

## 2024-03-25 NOTE — TELEPHONE ENCOUNTER
If she can get in for it in the next 1-2 days, she can take a serum HCG the day prior and abstain from intercourse now. She will need back up birth control for 1 week after her Depo shot. I will place orders for the HCG.

## 2024-03-25 NOTE — TELEPHONE ENCOUNTER
Spoke to patient and reviewed message. Per patient she can only come in next Monday. Reviewed precautions.

## 2024-03-25 NOTE — TELEPHONE ENCOUNTER
That is outside the recommended 1 week late window so she will have to wait on the injection. She can do the Depo 2 weeks from the serum HCG. She will then have to do a urine HCG at the time of the Depo. She will have to abstain now through the injection and use back up birth control x 1 week back on the injection.

## 2024-03-25 NOTE — TELEPHONE ENCOUNTER
Pt. last seen for :CARLOS MANUEL with Karla 9/26/23..where starting Depo shots was discussed and pt. Received first Depo shot at that appointment.  Received 2nd Depo shot : 12/19/23  Was a \"No Show \" 3/13/24 for her 3rd  Depo Shot  (was due between 3-6 and 3-20) so that window is closed now..  Does she have an appointment. with provider ?   Or what is next step?  Please advise..thanks

## 2024-03-25 NOTE — TELEPHONE ENCOUNTER
Patient would like to make appt for Depo shot. She missed her scheduled one and she is supposed to had it 3/6-3/20. Please advise

## 2024-04-10 ENCOUNTER — TELEPHONE (OUTPATIENT)
Dept: OBGYN CLINIC | Facility: CLINIC | Age: 39
End: 2024-04-10

## 2024-04-10 NOTE — TELEPHONE ENCOUNTER
Patient had previously missed her depo within the return window and was advised to abstain from intercourse and complete HCG lab on 3/25/24.  Patient is requesting to schedule depo injection now that she started menses 3 days ago and is bleeding a normal period flow.  Last intercourse over a month ago.  Patient would like to complete urine pregnancy test just prior to depo rather than go to lab for blood draw.    Routed to provider for review.

## 2024-04-10 NOTE — TELEPHONE ENCOUNTER
Appointment scheduled.  Patient aware that she will need to supply a small urine sample upon arrival for standard protocol pregnancy test.

## 2024-04-12 ENCOUNTER — NURSE ONLY (OUTPATIENT)
Dept: OBGYN CLINIC | Facility: CLINIC | Age: 39
End: 2024-04-12
Payer: MEDICAID

## 2024-04-12 VITALS — WEIGHT: 237.38 LBS | BODY MASS INDEX: 43.68 KG/M2 | HEIGHT: 62 IN

## 2024-04-12 DIAGNOSIS — G43.701 CHRONIC MIGRAINE WITHOUT AURA WITH STATUS MIGRAINOSUS, NOT INTRACTABLE: ICD-10-CM

## 2024-04-12 DIAGNOSIS — Z30.013 ENCOUNTER FOR INITIAL PRESCRIPTION OF INJECTABLE CONTRACEPTIVE: Primary | ICD-10-CM

## 2024-04-12 LAB
CONTROL LINE PRESENT WITH A CLEAR BACKGROUND (YES/NO): YES YES/NO
KIT LOT #: NORMAL NUMERIC
PREGNANCY TEST, URINE: NEGATIVE

## 2024-04-12 PROCEDURE — 96372 THER/PROPH/DIAG INJ SC/IM: CPT | Performed by: NURSE PRACTITIONER

## 2024-04-12 PROCEDURE — 81025 URINE PREGNANCY TEST: CPT | Performed by: OBSTETRICS & GYNECOLOGY

## 2024-04-12 RX ORDER — FLUTICASONE PROPIONATE 50 MCG
1 SPRAY, SUSPENSION (ML) NASAL DAILY
COMMUNITY
Start: 2024-01-28

## 2024-04-12 RX ADMIN — MEDROXYPROGESTERONE ACETATE 150 MG: 150 INJECTION, SUSPENSION INTRAMUSCULAR at 10:39:00

## 2024-04-12 NOTE — TELEPHONE ENCOUNTER
Requesting Butabital  Last OV: 11/30/23 VV acute  RTC: prn  Last Rx'd 11/22/23 #40 with 0 refills    Future Appointments   Date Time Provider Department Center   6/5/2024 11:00 AM Jhonatan Page MD [facility] [address] [facility] [address]   7/12/2024  9:00 AM [facility] NURSE [facility] [address]     Per [place] , last dispensed 11/24/23 #40    Non-protocol med:  Rx pended and routed for approval/denial

## 2024-04-12 NOTE — TELEPHONE ENCOUNTER
- Pt is requesting a new rx for headaches.     Disp Refills Start End    Butalbital-APAP-Caffeine -40 MG Oral Cap 40 capsule 0 11/22/2023 --    Sig - Route: Take 1 capsule by mouth every 4 (four) hours as needed for Pain. - Oral    Sent to pharmacy as: Butalbital-APAP-Caffeine -40 MG Oral Capsule    E-Prescribing Status: Receipt confirmed by pharmacy (11/22/2023 12:36 PM CST)        [facility]    Future Appointments   Date Time Provider Department Center   6/5/2024 11:00 AM Jhonatan Page MD [facility] [address]   7/12/2024  9:00 AM [facility] NURSE [facility] [address]

## 2024-04-14 RX ORDER — BUTALBITAL, ACETAMINOPHEN AND CAFFEINE 50; 325; 40 MG/1; MG/1; MG/1
1 CAPSULE ORAL EVERY 4 HOURS PRN
Qty: 40 CAPSULE | Refills: 0 | Status: SHIPPED | OUTPATIENT
Start: 2024-04-14

## 2024-05-23 ENCOUNTER — TELEMEDICINE (OUTPATIENT)
Dept: FAMILY MEDICINE CLINIC | Facility: CLINIC | Age: 39
End: 2024-05-23

## 2024-05-23 DIAGNOSIS — J32.9 CHRONIC SINUSITIS, UNSPECIFIED LOCATION: Primary | ICD-10-CM

## 2024-05-23 RX ORDER — AMOXICILLIN AND CLAVULANATE POTASSIUM 875; 125 MG/1; MG/1
1 TABLET, FILM COATED ORAL 2 TIMES DAILY
Qty: 20 TABLET | Refills: 0 | Status: SHIPPED | OUTPATIENT
Start: 2024-05-23 | End: 2024-06-02

## 2024-05-23 RX ORDER — FLUCONAZOLE 150 MG/1
150 TABLET ORAL ONCE
Qty: 1 TABLET | Refills: 0 | Status: SHIPPED | OUTPATIENT
Start: 2024-05-23 | End: 2024-05-23

## 2024-05-23 RX ORDER — BENZONATATE 200 MG/1
200 CAPSULE ORAL 3 TIMES DAILY PRN
Qty: 30 CAPSULE | Refills: 0 | Status: SHIPPED | OUTPATIENT
Start: 2024-05-23

## 2024-05-23 NOTE — PROGRESS NOTES
Subjective:   Patient ID: Marian Mora is a 38 year old female.    HPI  Mr. Mora is a pleasant 38-year-old female presenting for video visit today for sinus congestion associated with cough for 2 and half weeks.  She was seen at the urgent care and was recommended to manage this conservatively.  She has been using over-the-counter decongestants such as allergy medications and Mucinex but with no improvement.  Cough is productive of phlegm.  No fever no chest pain or shortness of breath no nausea no vomiting no abdominal pain.  She has had sinus infections in the past and will need to be treated with antibiotic. I had reviewed past medical and family histories together with allergy and medication lists documented.    History/Other:   Review of Systems   Constitutional:  Negative for fever.   HENT:  Positive for congestion, ear pain, sinus pressure and sinus pain.    Respiratory:  Negative for wheezing.    Cardiovascular:  Negative for chest pain and palpitations.   Gastrointestinal:  Negative for abdominal pain, diarrhea, nausea and vomiting.     Current Outpatient Medications   Medication Sig Dispense Refill    amoxicillin clavulanate 875-125 MG Oral Tab Take 1 tablet by mouth 2 (two) times daily for 10 days. 20 tablet 0    benzonatate 200 MG Oral Cap Take 1 capsule (200 mg total) by mouth 3 (three) times daily as needed. 30 capsule 0    fluconazole (DIFLUCAN) 150 MG Oral Tab Take 1 tablet (150 mg total) by mouth once for 1 dose. 1 tablet 0    Butalbital-APAP-Caffeine -40 MG Oral Cap Take 1 capsule by mouth every 4 (four) hours as needed for Pain. 40 capsule 0    fluticasone propionate 50 MCG/ACT Nasal Suspension 1 spray by Nasal route daily.      ARIPiprazole 2 MG Oral Tab Take 1 tablet (2 mg total) by mouth daily.      montelukast (SINGULAIR) 10 MG Oral Tab Take 1 tablet (10 mg total) by mouth daily. 90 tablet 3    buPROPion  MG Oral Tablet 24 Hr Take 1 tablet (150 mg total) by mouth  daily.      FLUoxetine HCl 40 MG Oral Cap Take 1 capsule (40 mg total) by mouth daily. 30 capsule 5    ferrous sulfate 325 (65 FE) MG Oral Tab EC Take 1 tablet (325 mg total) by mouth daily with breakfast.       Allergies:No Known Allergies    Objective:   Physical Exam  Constitutional:       General: She is not in acute distress.  Neurological:      Mental Status: She is alert.   Psychiatric:         Mood and Affect: Mood normal.         Behavior: Behavior normal.         Assessment & Plan:   1. Chronic sinusitis, unspecified location    -Continue with oral decongestants  - Will prescribe with Tessalon Perles as needed for cough suppression  - Keep hydrated  - Will prescribe with Augmentin and supplement with Diflucan if she would develop yeast infection  - May take Tylenol or ibuprofen as needed for fever or pain  - Call or come sooner if symptoms worsen or persist      This note was prepared using Dragon Medical voice recognition dictation software. As a result errors may occur. When identified these errors have been corrected. While every attempt is made to correct errors during dictation discrepancies may still exist            No orders of the defined types were placed in this encounter.      Meds This Visit:  Requested Prescriptions     Signed Prescriptions Disp Refills    amoxicillin clavulanate 875-125 MG Oral Tab 20 tablet 0     Sig: Take 1 tablet by mouth 2 (two) times daily for 10 days.    benzonatate 200 MG Oral Cap 30 capsule 0     Sig: Take 1 capsule (200 mg total) by mouth 3 (three) times daily as needed.    fluconazole (DIFLUCAN) 150 MG Oral Tab 1 tablet 0     Sig: Take 1 tablet (150 mg total) by mouth once for 1 dose.       Imaging & Referrals:  None

## 2024-06-06 ENCOUNTER — TELEMEDICINE (OUTPATIENT)
Dept: FAMILY MEDICINE CLINIC | Facility: CLINIC | Age: 39
End: 2024-06-06
Payer: MEDICAID

## 2024-06-06 DIAGNOSIS — J32.9 CHRONIC SINUSITIS, UNSPECIFIED LOCATION: Primary | ICD-10-CM

## 2024-06-06 PROCEDURE — 99213 OFFICE O/P EST LOW 20 MIN: CPT | Performed by: FAMILY MEDICINE

## 2024-06-06 RX ORDER — METHYLPREDNISOLONE 4 MG/1
TABLET ORAL
Qty: 1 EACH | Refills: 0 | Status: SHIPPED | OUTPATIENT
Start: 2024-06-06

## 2024-06-06 RX ORDER — DOXYCYCLINE HYCLATE 100 MG
100 TABLET ORAL 2 TIMES DAILY
Qty: 20 TABLET | Refills: 0 | Status: SHIPPED | OUTPATIENT
Start: 2024-06-06 | End: 2024-06-16

## 2024-06-06 NOTE — PROGRESS NOTES
Subjective:   Patient ID: Marian Mora is a 38 year old female.    HPI  Ms. Mora is a pleasant 38-year-old female presents for video visit follow-up.  She had a video visit with me previously for sinusitis and was prescribed with Augmentin.  Usually this antibiotic will work for her but this time it has not fully resolved her symptoms but she continues to have congestion both nasally and facially.  Cough is productive of phlegm.  Although cough is more loose.  She does not report fever and or shortness of breath. I had reviewed past medical and family histories together with allergy and medication lists documented.    History/Other:   Review of Systems   Constitutional:  Negative for fever.   Cardiovascular:  Negative for chest pain.   Gastrointestinal:  Negative for abdominal pain, diarrhea, nausea and vomiting.     Current Outpatient Medications   Medication Sig Dispense Refill    methylPREDNISolone (MEDROL) 4 MG Oral Tablet Therapy Pack As directed. 1 each 0    Doxycycline Hyclate 100 MG Oral Tab Take 1 tablet (100 mg total) by mouth 2 (two) times daily for 10 days. 20 tablet 0    benzonatate 200 MG Oral Cap Take 1 capsule (200 mg total) by mouth 3 (three) times daily as needed. 30 capsule 0    Butalbital-APAP-Caffeine -40 MG Oral Cap Take 1 capsule by mouth every 4 (four) hours as needed for Pain. 40 capsule 0    fluticasone propionate 50 MCG/ACT Nasal Suspension 1 spray by Nasal route daily.      ARIPiprazole 2 MG Oral Tab Take 1 tablet (2 mg total) by mouth daily.      montelukast (SINGULAIR) 10 MG Oral Tab Take 1 tablet (10 mg total) by mouth daily. 90 tablet 3    buPROPion  MG Oral Tablet 24 Hr Take 1 tablet (150 mg total) by mouth daily.      FLUoxetine HCl 40 MG Oral Cap Take 1 capsule (40 mg total) by mouth daily. 30 capsule 5    ferrous sulfate 325 (65 FE) MG Oral Tab EC Take 1 tablet (325 mg total) by mouth daily with breakfast.       Allergies:No Known Allergies    Objective:    Physical Exam  Constitutional:       General: She is not in acute distress.  Neurological:      Mental Status: She is alert.   Psychiatric:         Mood and Affect: Mood normal.         Behavior: Behavior normal.         Assessment & Plan:   1. Chronic sinusitis, unspecified location    -Keep hydrated  - We will treat with Medrol Dosepak and start doxycycline 100 mg 1 tablet twice a day  - May take Tylenol or ibuprofen as needed for fever or pain  - Call or come in sooner if symptoms worsen or persist    This note was prepared using Dragon Medical voice recognition dictation software. As a result errors may occur. When identified these errors have been corrected. While every attempt is made to correct errors during dictation discrepancies may still exist            No orders of the defined types were placed in this encounter.      Meds This Visit:  Requested Prescriptions     Signed Prescriptions Disp Refills    methylPREDNISolone (MEDROL) 4 MG Oral Tablet Therapy Pack 1 each 0     Sig: As directed.    Doxycycline Hyclate 100 MG Oral Tab 20 tablet 0     Sig: Take 1 tablet (100 mg total) by mouth 2 (two) times daily for 10 days.       Imaging & Referrals:  None

## 2024-07-12 ENCOUNTER — NURSE ONLY (OUTPATIENT)
Dept: OBGYN CLINIC | Facility: CLINIC | Age: 39
End: 2024-07-12
Payer: MEDICAID

## 2024-07-12 VITALS — SYSTOLIC BLOOD PRESSURE: 128 MMHG | WEIGHT: 240 LBS | DIASTOLIC BLOOD PRESSURE: 78 MMHG | BODY MASS INDEX: 44 KG/M2

## 2024-07-12 PROCEDURE — 96372 THER/PROPH/DIAG INJ SC/IM: CPT | Performed by: NURSE PRACTITIONER

## 2024-07-12 RX ADMIN — MEDROXYPROGESTERONE ACETATE 150 MG: 150 INJECTION, SUSPENSION INTRAMUSCULAR at 09:11:00

## 2024-07-12 NOTE — PROGRESS NOTES
Patient name, date of birth, and medication name, dose and route verified before administration. Patient consents to treatment and tolerated injection well. Patient given card with today's date and with next dates due written on it. Explained to patient that she will need to return between 09/27/24 and 10/11/24 for her next injection. Patient is aware that she will be due for her WWE during that time frame and can scheduled OV and receive injection during the visit as long as she is within her time frame. Encouraged her to schedule return appointment before leaving today. She states understanding.

## 2024-09-25 DIAGNOSIS — Z12.31 SCREENING MAMMOGRAM FOR BREAST CANCER: Primary | ICD-10-CM

## 2024-10-10 ENCOUNTER — OFFICE VISIT (OUTPATIENT)
Dept: OBGYN CLINIC | Facility: CLINIC | Age: 39
End: 2024-10-10
Payer: MEDICAID

## 2024-10-10 VITALS
BODY MASS INDEX: 44.23 KG/M2 | HEART RATE: 84 BPM | WEIGHT: 240.38 LBS | SYSTOLIC BLOOD PRESSURE: 124 MMHG | DIASTOLIC BLOOD PRESSURE: 72 MMHG | HEIGHT: 62 IN

## 2024-10-10 DIAGNOSIS — Z30.42 ENCOUNTER FOR SURVEILLANCE OF INJECTABLE CONTRACEPTIVE: ICD-10-CM

## 2024-10-10 DIAGNOSIS — Z01.419 WELL WOMAN EXAM WITH ROUTINE GYNECOLOGICAL EXAM: Primary | ICD-10-CM

## 2024-10-10 PROCEDURE — 99459 PELVIC EXAMINATION: CPT | Performed by: NURSE PRACTITIONER

## 2024-10-10 PROCEDURE — 99396 PREV VISIT EST AGE 40-64: CPT | Performed by: NURSE PRACTITIONER

## 2024-10-10 PROCEDURE — 96372 THER/PROPH/DIAG INJ SC/IM: CPT | Performed by: NURSE PRACTITIONER

## 2024-10-10 RX ORDER — ALBUTEROL SULFATE 90 UG/1
INHALANT RESPIRATORY (INHALATION)
COMMUNITY
Start: 2024-09-23

## 2024-10-10 RX ORDER — MEDROXYPROGESTERONE ACETATE 150 MG/ML
150 INJECTION, SUSPENSION INTRAMUSCULAR
Status: SHIPPED | OUTPATIENT
Start: 2024-10-10 | End: 2025-10-05

## 2024-10-10 RX ORDER — ARIPIPRAZOLE 5 MG/1
TABLET ORAL
COMMUNITY
Start: 2024-09-03

## 2024-10-10 RX ORDER — ALBUTEROL SULFATE 0.83 MG/ML
SOLUTION RESPIRATORY (INHALATION)
COMMUNITY
Start: 2024-04-23

## 2024-10-10 RX ADMIN — MEDROXYPROGESTERONE ACETATE 150 MG: 150 INJECTION, SUSPENSION INTRAMUSCULAR at 09:55:00

## 2024-10-10 NOTE — PROGRESS NOTES
Patient seen today for her Depo injection.   Last dose was 7/12 and was due 9/27 - 10/11.   Patient consents to treatment and tolerated injection well.  Patient given card with today's date and with next dates due written on it.   Explained to patient that she will need to return between 12/26 and 1/09 for her next injection.  Encouraged her to schedule return appointment before leaving today. She states understanding.

## 2024-10-10 NOTE — PROGRESS NOTES
Subjective:  Chief Complaint   Patient presents with    Annual     Annual and Depo shot     39 year old female  presents for annual.    Denies current complaints  Patient's last menstrual period was 2024 (approximate).  Hx Prior Abnormal Pap: No  Pap Date: 22  Pap Result Notes: wnl  Menarche:  (10/10/2024  9:01 AM)  Period Cycle (Days): once every 3 months (10/10/2024  9:01 AM)  Period Duration (Days): 4-7 days (10/10/2024  9:01 AM)  Period Flow: light to spotting (10/10/2024  9:01 AM)  Use of Birth Control (if yes, specify type): Depo Provera (10/10/2024  9:01 AM)  Hx Prior Abnormal Pap: No (10/10/2024  9:01 AM)  Pap Date: 22 (10/10/2024  9:01 AM)  Pap Result Notes: wnl (10/10/2024  9:01 AM)    Last Mammo:  none     Contraception: depo, tolerating well, would like to continue    Denies family history of breast, ovarian and colon CA.    Feeling safe at home.    Most Recent Immunizations   Administered Date(s) Administered    Covid-19 Vaccine Pfizer 30 mcg/0.3 ml 2021    Influenza 10/01/2018    TD 10/09/2014    TDAP 2020   Pended Date(s) Pended    FLULAVAL 6 months & older 0.5 ml Prefilled syringe (54133) 2019      reports that she has been smoking cigarettes. She has never used smokeless tobacco.   reports that she does not currently use alcohol.    Past Medical History:    Anxiety    Closed displaced fracture of fifth metatarsal bone of left foot    Comminuted fracture of bone    Dog bite, hand, right, subsequent encounter    MIGRAINES    paralyzed Right side of face    Migraines    OTHER DISEASES    chicken pox as a child      Past Surgical History:   Procedure Laterality Date    Cholecystectomy      laproscpic 2006?    Colonoscopy          Other surgical history      EGD 2002    Removal gallbladder      Tonsillectomy             Review of Systems:  Pertinent items are noted in the HPI.    Objective:  /72   Pulse 84   Ht 62\"   Wt 240 lb 6 oz (109  kg)   LMP 09/30/2024 (Approximate)   BMI 43.97 kg/m²    Physical Examination:  General appearance: Well dressed, well nourished in no apparent distress  Neurologic/Psychiatric: Alert and oriented to person, place and time, mood normal, affect appropriate  Head: Normocephalic without obvious deformity, atraumatic  Neck: No thyromegaly, supple, non-tender, no masses, no adenopathy  Lungs: Clear to auscultation bilaterally, no rales, wheezes or rhonchi  Breasts: Symmetric, non-tender, no masses, lesions, retraction, dimpling or discharge bilaterally, no axillary or supraclavicular lymphadenopathy  Heart:: Regular rate and rhythm, no gallops or murmurs  Abdomen: Soft, non-tender, non-distended, no masses, no hepatosplenomegaly, no hernias, no inguinal lymphadenopathy  Pelvic:    External genitalia- Normal, Bartholin's, urethra, skeins glands normal   Vagina- No vaginal lesions, physiologic discharge   Urethra- Non-tender, no masses   Urethral Meatus- No lesions or masses, no prolapse   Bladder- Non-tender, no masses   Cervix- No lesions, long/closed, no cervical motion tenderness   Uterus- Normal sized, non-tender, no masses   Adnexa-  Non-tender, no masses   Anus/Perineum- Normal, no masses or lesions  Extremities: Non-tender, full range of motion, no clubbing, cyanosis or edema  Skin:  General inspection- no rashes, lesions or discoloration    Assessment/Plan:  Normal well-woman exam.  Yearly mammogram ordered per PCP, reminded to complete  Screening colonoscopy at age 43    Declined chaperone for exam today     Diagnoses and all orders for this visit:    Well woman exam with routine gynecological exam  - self breast exam discussed and encouraged  - ASCCP pap guidelines discussed, pap deferred    Encounter for surveillance of injectable contraceptive  -     medroxyPROGESTERone (Depo-Provera) 150 MG/ML injection 150 mg         Return in about 3 months (around 1/10/2025) for depo.

## 2025-01-08 ENCOUNTER — NURSE ONLY (OUTPATIENT)
Dept: OBGYN CLINIC | Facility: CLINIC | Age: 40
End: 2025-01-08
Payer: MEDICAID

## 2025-01-08 VITALS
HEIGHT: 62 IN | WEIGHT: 243.19 LBS | DIASTOLIC BLOOD PRESSURE: 82 MMHG | SYSTOLIC BLOOD PRESSURE: 118 MMHG | BODY MASS INDEX: 44.75 KG/M2 | HEART RATE: 83 BPM

## 2025-01-08 PROCEDURE — 96372 THER/PROPH/DIAG INJ SC/IM: CPT | Performed by: NURSE PRACTITIONER

## 2025-01-08 RX ADMIN — MEDROXYPROGESTERONE ACETATE 150 MG: 150 INJECTION, SUSPENSION INTRAMUSCULAR at 11:59:00

## 2025-04-07 ENCOUNTER — NURSE ONLY (OUTPATIENT)
Dept: OBGYN CLINIC | Facility: CLINIC | Age: 40
End: 2025-04-07
Payer: MEDICAID

## 2025-04-07 VITALS
BODY MASS INDEX: 45.94 KG/M2 | SYSTOLIC BLOOD PRESSURE: 102 MMHG | RESPIRATION RATE: 18 BRPM | DIASTOLIC BLOOD PRESSURE: 72 MMHG | WEIGHT: 249.63 LBS | HEIGHT: 62 IN | HEART RATE: 95 BPM

## 2025-04-07 DIAGNOSIS — Z01.812 PRE-PROCEDURAL LABORATORY EXAMINATION: Primary | ICD-10-CM

## 2025-04-07 DIAGNOSIS — Z30.8 ENCOUNTER FOR OTHER CONTRACEPTIVE MANAGEMENT: ICD-10-CM

## 2025-04-07 LAB
CONTROL LINE PRESENT WITH A CLEAR BACKGROUND (YES/NO): YES YES/NO
KIT EXPIRATION DATE: NORMAL DATE
KIT LOT #: NORMAL NUMERIC
PREGNANCY TEST, URINE: NEGATIVE

## 2025-04-07 PROCEDURE — 96372 THER/PROPH/DIAG INJ SC/IM: CPT | Performed by: OBSTETRICS & GYNECOLOGY

## 2025-04-07 PROCEDURE — 81025 URINE PREGNANCY TEST: CPT | Performed by: OBSTETRICS & GYNECOLOGY

## 2025-04-07 RX ORDER — MEDROXYPROGESTERONE ACETATE 150 MG/ML
150 INJECTION, SUSPENSION INTRAMUSCULAR ONCE
Status: COMPLETED | OUTPATIENT
Start: 2025-04-07 | End: 2025-04-07

## 2025-04-07 RX ADMIN — MEDROXYPROGESTERONE ACETATE 150 MG: 150 INJECTION, SUSPENSION INTRAMUSCULAR at 11:30:00

## 2025-04-07 NOTE — PROGRESS NOTES
Patient here for depo, tolerated well. Given in Left upper arm. Next Depo is due 06/27/2025-07/03/2025, card given.

## 2025-04-22 ENCOUNTER — OFFICE VISIT (OUTPATIENT)
Dept: FAMILY MEDICINE CLINIC | Facility: CLINIC | Age: 40
End: 2025-04-22
Payer: MEDICAID

## 2025-04-22 VITALS
RESPIRATION RATE: 16 BRPM | HEART RATE: 92 BPM | SYSTOLIC BLOOD PRESSURE: 118 MMHG | HEIGHT: 62 IN | TEMPERATURE: 98 F | WEIGHT: 245 LBS | OXYGEN SATURATION: 98 % | DIASTOLIC BLOOD PRESSURE: 70 MMHG | BODY MASS INDEX: 45.08 KG/M2

## 2025-04-22 DIAGNOSIS — J32.9 CHRONIC SINUSITIS, UNSPECIFIED LOCATION: Primary | ICD-10-CM

## 2025-04-22 DIAGNOSIS — Z51.81 MEDICATION MONITORING ENCOUNTER: ICD-10-CM

## 2025-04-22 DIAGNOSIS — G43.701 CHRONIC MIGRAINE WITHOUT AURA WITH STATUS MIGRAINOSUS, NOT INTRACTABLE: ICD-10-CM

## 2025-04-22 PROCEDURE — 99214 OFFICE O/P EST MOD 30 MIN: CPT | Performed by: FAMILY MEDICINE

## 2025-04-22 RX ORDER — FLUTICASONE PROPIONATE 50 MCG
2 SPRAY, SUSPENSION (ML) NASAL DAILY
Qty: 3 EACH | Refills: 1 | Status: SHIPPED | OUTPATIENT
Start: 2025-04-22 | End: 2026-04-17

## 2025-04-22 RX ORDER — BUTALBITAL, ACETAMINOPHEN AND CAFFEINE 50; 325; 40 MG/1; MG/1; MG/1
1 CAPSULE ORAL EVERY 4 HOURS PRN
Qty: 40 CAPSULE | Refills: 3 | Status: SHIPPED | OUTPATIENT
Start: 2025-04-22

## 2025-04-22 RX ORDER — GLUCOSAMINE/D3/BOSWELLIA SERRA 1500MG-400
1 TABLET ORAL DAILY
COMMUNITY

## 2025-04-22 NOTE — PATIENT INSTRUCTIONS
Thank you for choosing Narayan Sue MD at [facility]  To Do: Marian JAYLEEN Mora  1. Please see below   Call [phone] to schedule the appointment.   Please signup for [employer], which is electronic access to your record if you have not done so.  All your results will post on there.  [email or web address]   You can NOW use [employer] to book your appointments with us, or consider using open access scheduling which is through the [employer] website [email or web address] and type in Narayan Sue MD and follow the links for \"Schedule Online Now\"    To schedule Imaging or tests at [place] call Central Scheduling [phone], Go to [facility] ER Building (For example: CT scans, X rays, Ultrasound, MRI)  Cardiac Testing in ER building Building A second floor Cardiac Testing [phone] (For example: Holter Monitor, Cardiac Stress tests,Event Monitor, or 2D Echocardiograms)  [facility] call [phone] usually in [facility]  Walk in Clinic in [place] at [address] Mon-Fri at 8am-7:30 p.m., and Sat/Sun 9:00a.m.-4:30 p.m.  Also at [address] [place] [address]  Call [phone] for info     Please call our office about any questions regarding your treatment/medicines/tests as a result of today's visit.  For your safety, read the entire package insert of all medicines prescribed to you and be aware of all of the risks of treatment even beyond those discussed today.  All therapies have potential risk of harm or side effects or medication interactions.  It is your duty and for your safety to discuss with the pharmacist and our office with questions, and to notify us and stop treatment if problems arise, but know that our intention is that the benefits outweigh those potential risks and we strive to make you healthier and to improve your quality of life.    Referrals, and Radiology Information:    If your insurance requires a referral to a specialist, please allow 5 business days to  process your referral request.    If Narayan Sue MD orders a CT or MRI, it may take up to 10 business days to receive approval from your insurance company. Once our office has called informing you that the insurance company approved your testing, please call Central Scheduling at [phone]  Please allow our office 5 business days to contact you regarding any testing results.    Refill policies:   Allow 3 business days for refills; controlled substances may take longer and must be picked up from the office in person.  Narcotic medications can only be filled in 30 day increments and must be refilled at an office visit only.  If your prescription is due for a refill, you may be due for a follow-up appointment.  We cannot refill your maintenance medications at a preventative wellness visit.  To best provide you care, patients receiving maintenance medications need to be seen at least twice a year.

## 2025-04-22 NOTE — PROGRESS NOTES
Subjective:   Patient ID: Marian Mora is a 39 year old female.    HPI  Ms. Mora is a pleasant 39-year-old female with history of chronic migraines, allergic rhinitis and sinusitis, depression, anxiety, PTSD presenting today as she had seen her psychiatrist recently and is planning on being started on Adderall.  She takes bupropion and fluoxetine.  She would need an EKG prior to being started on Adderall.  She also has been having sinus congestion and pain for the past 2 weeks.  In the past she had taken Augmentin which did help.  No fever no cough no chest pain no shortness of breath no palpitations no nausea no vomiting normal pain.  She would need refills for her migraine medications.    I had reviewed past medical and family histories together with allergy and medication lists documented.    History/Other:   Review of Systems   Constitutional:  Negative for fatigue and fever.   HENT:  Positive for congestion and sinus pressure. Negative for sore throat.    Respiratory:  Negative for cough and shortness of breath.    Cardiovascular:  Negative for chest pain and palpitations.   Gastrointestinal:  Negative for abdominal pain, nausea and vomiting.   Neurological:  Positive for headaches. Negative for dizziness and weakness.     Current Medications[1]  Allergies:Allergies[2]    Objective:   Physical Exam  HENT:      Right Ear: Ear canal and external ear normal. No drainage. A middle ear effusion is present. There is no impacted cerumen. Tympanic membrane is bulging. Tympanic membrane is not erythematous.      Left Ear: Ear canal and external ear normal. No drainage.  No middle ear effusion. There is no impacted cerumen. Tympanic membrane is bulging. Tympanic membrane is not erythematous.      Nose: No congestion or rhinorrhea.      Mouth/Throat:      Mouth: Mucous membranes are moist.      Pharynx: Oropharynx is clear.   Eyes:      General: No scleral icterus.     Conjunctiva/sclera: Conjunctivae  normal.   Cardiovascular:      Rate and Rhythm: Normal rate and regular rhythm.      Heart sounds: Normal heart sounds. No murmur heard.  Pulmonary:      Effort: Pulmonary effort is normal. No respiratory distress.      Breath sounds: Normal breath sounds. No wheezing or rales.   Abdominal:      General: Bowel sounds are normal.      Palpations: Abdomen is soft.   Musculoskeletal:      Right lower leg: No edema.      Left lower leg: No edema.   Skin:     General: Skin is warm.   Neurological:      General: No focal deficit present.      Mental Status: She is alert.   Psychiatric:         Mood and Affect: Mood normal.         Behavior: Behavior normal.         Assessment & Plan:   1. Chronic sinusitis, unspecified location   -Will prescribe with Augmentin and Flonase  - If with no improvement will refer to ENT   2. Chronic migraine without aura with status migrainosus, not intractable   -Stable  - Fioricet was refilled and sent to her pharmacy   3. Medication monitoring encounter   - Will order EKG prior to her being prescribed Adderall   Follow-up as scheduled or as needed    This note was prepared using Dragon Medical voice recognition dictation software. As a result errors may occur. When identified these errors have been corrected. While every attempt is made to correct errors during dictation discrepancies may still exist            No orders of the defined types were placed in this encounter.      Meds This Visit:  Requested Prescriptions     Signed Prescriptions Disp Refills    Butalbital-APAP-Caffeine -40 MG Oral Cap 40 capsule 3     Sig: Take 1 capsule by mouth every 4 (four) hours as needed for Pain.    amoxicillin clavulanate 875-125 MG Oral Tab 20 tablet 0     Sig: Take 1 tablet by mouth 2 (two) times daily for 10 days.    fluticasone propionate 50 MCG/ACT Nasal Suspension 3 each 1     Si sprays by Each Nare route daily.       Imaging & Referrals:  EKG 12-LEAD         [1]   Current Outpatient  Medications   Medication Sig Dispense Refill    Butalbital-APAP-Caffeine -40 MG Oral Cap Take 1 capsule by mouth every 4 (four) hours as needed for Pain. 40 capsule 3    Biotin 28782 MCG Oral Tab Take 1 tablet by mouth in the morning.      amoxicillin clavulanate 875-125 MG Oral Tab Take 1 tablet by mouth 2 (two) times daily for 10 days. 20 tablet 0    fluticasone propionate 50 MCG/ACT Nasal Suspension 2 sprays by Each Nare route daily. 3 each 1    FLUoxetine 20 MG Oral Cap       ARIPiprazole 5 MG Oral Tab       albuterol 108 (90 Base) MCG/ACT Inhalation Aero Soln INHALE 2 PUFFS BY MOUTH EVERY 4 TO 6 HOURS AS NEEDED FOR WHEEZING OR TIGHTNESS      albuterol (2.5 MG/3ML) 0.083% Inhalation Nebu Soln USE 1 VIAL IN NEBULIZER EVERY 6 HOURS AS NEEDED FOR 7 DAYS      buPROPion  MG Oral Tablet 24 Hr Take 1 tablet (150 mg total) by mouth daily.      FLUoxetine HCl 40 MG Oral Cap Take 1 capsule (40 mg total) by mouth daily. 30 capsule 5    ferrous sulfate 325 (65 FE) MG Oral Tab EC Take 1 tablet (325 mg total) by mouth daily with breakfast.     [2] No Known Allergies

## 2025-07-03 ENCOUNTER — NURSE ONLY (OUTPATIENT)
Dept: OBGYN CLINIC | Facility: CLINIC | Age: 40
End: 2025-07-03
Payer: MEDICAID

## 2025-07-03 VITALS
DIASTOLIC BLOOD PRESSURE: 70 MMHG | HEART RATE: 90 BPM | WEIGHT: 238.5 LBS | HEIGHT: 62 IN | SYSTOLIC BLOOD PRESSURE: 118 MMHG | BODY MASS INDEX: 43.89 KG/M2

## 2025-07-03 DIAGNOSIS — Z30.42 ENCOUNTER FOR SURVEILLANCE OF INJECTABLE CONTRACEPTIVE: Primary | ICD-10-CM

## 2025-07-03 PROCEDURE — 96372 THER/PROPH/DIAG INJ SC/IM: CPT | Performed by: NURSE PRACTITIONER

## 2025-07-03 RX ORDER — MEDROXYPROGESTERONE ACETATE 150 MG/ML
150 INJECTION, SUSPENSION INTRAMUSCULAR ONCE
Status: COMPLETED | OUTPATIENT
Start: 2025-07-03 | End: 2025-07-03

## 2025-07-03 RX ORDER — ERYTHROMYCIN 5 MG/G
OINTMENT OPHTHALMIC
COMMUNITY
Start: 2025-06-24

## 2025-07-03 RX ADMIN — MEDROXYPROGESTERONE ACETATE 150 MG: 150 INJECTION, SUSPENSION INTRAMUSCULAR at 12:57:00

## (undated) DIAGNOSIS — J30.2 SEASONAL ALLERGIES: ICD-10-CM

## (undated) DIAGNOSIS — M10.9 GOUT, UNSPECIFIED CAUSE, UNSPECIFIED CHRONICITY, UNSPECIFIED SITE: Primary | ICD-10-CM

## (undated) DIAGNOSIS — J01.90 ACUTE NON-RECURRENT SINUSITIS, UNSPECIFIED LOCATION: ICD-10-CM

## (undated) NOTE — MR AVS SNAPSHOT
[facility] [address] [place] [address] [address] [facility]  [address] [id number] [phone]               Thank you for choosing us for your health care visit with Emery Bruce MD.  We are glad to serve you and happy to provide you wit - [address] [facility] [address]), [phone], [phone]  [phone]     Phone:  [phone]    - azithromycin 250 MG Tabs            Follow-up Instructions     Return in about 1 week (around

## (undated) NOTE — LETTER
Patient Name: May Del Cid  YOB: 1985          MRN number:  [id number]  Date:  8/7/2017  Referring Physician:  [address] [place] [address]      Dear Dr. Rudy Kasper,    Your patient May Del Cid has received 2 sessions of Physical Therapy cons

## (undated) NOTE — LETTER
Date: 4/6/2020    Patient Name: Cherise Ann          To Whom it may concern:        Leonor may return to work on 4/6/20 without restrictions.          Sincerely,    Selvin Beatty MD

## (undated) NOTE — MR AVS SNAPSHOT
After Visit Summary   6/1/2020    Damaris Ahumada    MRN: [id number]           Visit Information     Date & Time  6/1/2020  1:00 PM Provider  Gee Wyatt, [address], [facility] Dept.  Phone  [phone] complete it and provide feedback. We strive to deliver the best patient experience and are looking for ways to make improvements. Your feedback will help us do so. For more information on [employer], please visit [email or web address] at a hospital emergency room. Average cost  $2,300*   *Cost varies based on your insurance coverage  For more information about hours, locations or appointment options available at [facility],   visit [email or web address] or call [phone].  (

## (undated) NOTE — ED AVS SNAPSHOT
Ms. Damaris Ahumada   MRN: [id number]    Department:  THE [facility] Emergency Department in [place]   Date of Visit:  6/23/2018           Disclosure     Insurance plans vary and the physician(s) referred by the ER may not be covered by your plan.  Ple tell this physician (or your personal doctor if your instructions are to return to your personal doctor) about any new or lasting problems. The primary care or specialist physician will see patients referred from the [facility] Emergency Department.  Georges Fernández

## (undated) NOTE — ED AVS SNAPSHOT
Ms. Hay Madison   MRN: [id number]    Department:  [facility] Emergency Department in [place]   Date of Visit:  9/19/2017           Disclosure     Insurance plans vary and the physician(s) referred by the ER may not be covered by your plan.  Ple If you have been prescribed any medication(s), please fill your prescription right away and begin taking the medication(s) as directed    If the emergency physician has read X-rays, these will be re-interpreted by a radiologist.  If there is a significant

## (undated) NOTE — LETTER
Patient Name: Hay Madison  YOB: 1985          MRN number:  [id number]  Date:  7/25/2017  Referring Physician:  Ezekiel Howell EVALUATION:    Referring Physician: Dr. Candace Chi  Diagnosis: Right Phalanx (4th) C Accessory motion: Increased tenderness with accessory motion testing for dorsal glide of the 4th MTP. Flexibility: Good flexibility of the common extensor and flexor musculature.       Strength/MMT: Full MMT of all phalanx, wrist, and elbows bilaterally

## (undated) NOTE — MR AVS SNAPSHOT
[facility] [address] [place] [address] 700 [facility]  Josefina Dewitt [id number] [phone]               Thank you for choosing us for your health care visit with Emery Bruce MD.  We are glad to serve you and happy to provide you wit Take 1 capsule (20 mg total) by mouth daily. Commonly known as:  PROZAC           MedroxyPROGESTERone Acetate 150 MG/ML Ai   INJECT 1 ML IM Q 3 MONTHS           methylPREDNISolone 4 MG Tbpk   As directed.    Commonly known as:  MEDROL           Ondanset

## (undated) NOTE — ED AVS SNAPSHOT
Ms. Marybeth Moody   MRN: [id number]    Department:  THE [facility] Emergency Department in [place]   Date of Visit:  3/7/2018           Disclosure     Insurance plans vary and the physician(s) referred by the ER may not be covered by your plan.  Vitor tell this physician (or your personal doctor if your instructions are to return to your personal doctor) about any new or lasting problems. The primary care or specialist physician will see patients referred from the [facility] Emergency Department.  Acacia Billy

## (undated) NOTE — LETTER
Date: 5/16/2019    Patient Name: Cherise Ann          To Whom it may concern: This letter has been written at the patient's request. The above patient was seen at the [facility] for treatment of a medical condition.     This patie

## (undated) NOTE — LETTER
Date: 4/22/2025    Patient Name: Marian Mora          To Whom it may concern:    This letter has been written at the patient's request. The above patient was seen at [facility] for treatment of a medical condition.    Please excuse her from missing any work hours that she may have incurred during this visit.        Sincerely,    Narayan Sue MD

## (undated) NOTE — ED AVS SNAPSHOT
Ms. Tulio Offer   MRN: [id number]    Department:  [facility] Emergency Department in [place]   Date of Visit:  12/20/2017           Disclosure     Insurance plans vary and the physician(s) referred by the ER may not be covered by your plan.  Pl tell this physician (or your personal doctor if your instructions are to return to your personal doctor) about any new or lasting problems. The primary care or specialist physician will see patients referred from the [facility] Emergency Department.  Raina Chery

## (undated) NOTE — LETTER
10/14/19        [facility] [place] [address]      Dear Juan Bliss records indicate that you have outstanding lab work and or testing that was ordered for you and has not yet been completed:  Lisa Torres

## (undated) NOTE — ED AVS SNAPSHOT
THE [facility] Emergency Department in [address] [facility]    Phone:  [phone]    Fax:  [phone]           Ms. Gainesangelrandycriselda Benoit   MRN: [id number]    Department:  THE [facility] Emergency Department in [facility] of that Physician. IF THERE IS ANY CHANGE OR WORSENING OF YOUR CONDITION, CALL YOUR PRIMARY CARE PHYSICIAN AT ONCE OR RETURN IMMEDIATELY TO THE EMERGENCY DEPARTMENT.     If you have been prescribed any medication(s), please fill your prescription right a

## (undated) NOTE — MR AVS SNAPSHOT
[facility] [address] [place] [address] 700 [facility]  Yessenia Powers 107 [address] [phone]               Thank you for choosing us for your health care visit with Ross Sousa MD.  We are glad to serve you and happy to provide you wit Phone:  [phone]    - Neomycin-Polymyxin-HC [id number] Soln            Follow-up Instructions     Return in about 3 days (around 2/9/2017). [employer]     Sign up for [employer], your secure online medical record.   [employer] will allow you to access

## (undated) NOTE — ED AVS SNAPSHOT
THE [facility] Emergency Department in [address] [facility]    Phone:  [phone]    Fax:  [phone]           Ms. Sharon Solorzano   MRN: [id number]    Department:  THE [facility] Emergency Department in [facility] of that Physician. IF THERE IS ANY CHANGE OR WORSENING OF YOUR CONDITION, CALL YOUR PRIMARY CARE PHYSICIAN AT ONCE OR RETURN IMMEDIATELY TO THE EMERGENCY DEPARTMENT.     If you have been prescribed any medication(s), please fill your prescription right a

## (undated) NOTE — LETTER
Bernardo Amezcua, JAGDEEPE:[birth year]    CONSENT FOR PROCEDURE/SEDATION    1. I authorize the performance upon Bernardo Amezcua  the following: Removal only Nexplanon    2.  I authorize BILLY Hollins (and whomever is designated as the doctor’s Relationship to patient: ____________________________________________    Witness: _________________________________________ Date:___________     Physician Signature: _______________________________ Date:___________

## (undated) NOTE — MR AVS SNAPSHOT
[address] 700 [facility]  Bouch[address]. Deon Powers 107 [address] [phone]               Thank you for choosing us for your health care visit with Melina Blevins MD.  We are glad to serve you and happy to provide you wit Commonly known as:  NEXIUM           ibuprofen 600 MG Tabs   Take 1 tablet (600 mg total) by mouth every 8 (eight) hours as needed for Pain.    Commonly known as:  MOTRIN           MedroxyPROGESTERone Acetate 150 MG/ML Ai   INJECT 1 ML IM Q 3 MONTHS

## (undated) NOTE — MR AVS SNAPSHOT
[facility] [address] [place] [address] 700 [facility] [place] [place]  Amol Edmonds [address] [phone]               Thank you for choosing us for your health care visit with Nathan Tellez MD.  We are glad to serve you and happy to provide you wit

## (undated) NOTE — ED AVS SNAPSHOT
Ms. Dalton Rivera   MRN: [id number]    Department:  [facility] Emergency Department in [place]   Date of Visit:  12/29/2017           Disclosure     Insurance plans vary and the physician(s) referred by the ER may not be covered by your plan.  Pl tell this physician (or your personal doctor if your instructions are to return to your personal doctor) about any new or lasting problems. The primary care or specialist physician will see patients referred from the [facility] Emergency Department.  Mohit Posada

## (undated) NOTE — ED AVS SNAPSHOT
[address] [facility] Emergency Department in [address] [place] [address]  Phone:  [phone]  Fax:  [phone]          Ms. Cherise Ann   MRN: [id number]    Department:  [address] Emergency Department in [place]   Date of Visit: IF THERE IS ANY CHANGE OR WORSENING OF YOUR CONDITION, CALL YOUR PRIMARY CARE PHYSICIAN AT ONCE OR RETURN IMMEDIATELY TO THE EMERGENCY DEPARTMENT.     If you have been prescribed any medication(s), please fill your prescription right away and begin taking t

## (undated) NOTE — ED AVS SNAPSHOT
Ms. Celestina Jolley   MRN: [id number]    Department:  [address] Emergency Department in [place]   Date of Visit:  8/24/2019           Disclosure     Insurance plans vary and the physician(s) referred by the ER may not be covered by your plan.  Ple tell this physician (or your personal doctor if your instructions are to return to your personal doctor) about any new or lasting problems. The primary care or specialist physician will see patients referred from the [facility] Emergency Department.  Jyotsna Irwin

## (undated) NOTE — Clinical Note
Patient Name: Marybeth Moody  YOB: 1985          MRN number:  [id number]  Date:  1/2/2017  Referring Physician:  Lisa Bailey         SPINE EVALUATION:    Referring Physician: Dr. Mensah Hence: Low Back Pain     Date of Service: Neuro Screen: Symmetrical sensation, MMT, and reflexes per all UE and LE myotomes and dermatomes. Spinal ROM:   The patient has good spinal motion for all planes of motion of the cervical and lumbar spine.   Experiences a contralateral pulling sensatio and has agreed to actively participate in planning and for this course of care. Thank you for your referral.  If you have any questions, please contact me at Dept: [phone]. Sincerely,  Electronically signed by therapist:      Geneva Cockayne.  Zannie Hamman, PT

## (undated) NOTE — MR AVS SNAPSHOT
[facility] [address] [place] [address] 700 [facility]  Yessenia Powers 107 [id number] [phone]               Thank you for choosing us for your health care visit with Gerhardt Guile, MD.  We are glad to serve you and happy to provide you wit Migraine (with maximum of 150mg in 24 hour period. ).    Commonly known as:  IMITREX                Where to Get Your Medications      These medications were sent to [facility] [address], [address] [facility], [phone]

## (undated) NOTE — MR AVS SNAPSHOT
[facility] [address] [place] [address] 700 [facility]  [place] Deon Powers 107 [address] [phone]               Thank you for choosing us for your health care visit with Ewa Mcdowell DO.   We are glad to serve you and happy to provide you wi · Heat may help soothe painful areas of the face. Use a towel soaked in hot water. Or,  the shower and direct the hot spray onto your face.  Using a vaporizer along with a menthol rub at night may also help.   · An expectorant containing guaifenesin · Vision problems, including blurred or double vision  · Fever of 100.4ºF (38ºC) or higher, or as directed by your healthcare provider  · Seizure  · Breathing problems  · Symptoms not resolving within 10 days  Date Last Reviewed: 4/13/2015  © 5721-8770 The - 2000 [facility] ([address]), [phone], [phone]  [phone]     Phone:  [phone]    - Ciprofloxacin HCl 250 MG Tabs            Follow-up Instructions     Return if symptoms worsen

## (undated) NOTE — LETTER
Dear Employer,  At [facility], we are taking special precautions and doing everything we can to prevent the spread of COVID-19 (coronavirus disease 2019).  During this time, we ask for your assistance regarding physician documentation for empl

## (undated) NOTE — LETTER
Date: 7/19/2017    Patient Name: Celestina Jolley          To Whom it may concern: The above patient was seen at the [facility] for treatment of a medical condition.     This patient should be excused from attending work for two to th

## (undated) NOTE — MR AVS SNAPSHOT
[address]               Thank you for choosing us for your health care visit with herminia melchor PT. We are glad to serve you and happy to provide you with this summary of your visit.   Ple Expires: 2/12/2017 10:01 AM    If you have questions, you can call [phone] to talk to our [facility] Staff. Remember, [employer] is NOT to be used for urgent needs. For medical emergencies, dial 911.            Visit [employer]

## (undated) NOTE — MR AVS SNAPSHOT
[facility] [address] [place] [address] 700 [place], Susan. \Hospital Has a New Name and Outlook.\""  Yessenia Pwoers 107 [address] [phone]               Thank you for choosing us for your health care visit with Ev Wood MD.  We are glad to serve you and happy to provide you wit [email or web address]

## (undated) NOTE — MR AVS SNAPSHOT
[facility] [address] [place] [address] 700 [facility]  Yessenia Powers 107 [address] [phone]               Thank you for choosing us for your health care visit with Shiva Weiss MD.  We are glad to serve you and happy to provide you wit Commonly known as:  FIORICET, ESGIC           Esomeprazole Magnesium 40 MG Cpdr   Take 1 capsule (40 mg total) by mouth daily. Commonly known as:  NEXIUM           FLUoxetine HCl 20 MG Caps   Take 1 capsule (20 mg total) by mouth daily.    Commonly known

## (undated) NOTE — MR AVS SNAPSHOT
[facility] [address] [place] [address] 700 [facility]  Yessenia Powers 107 [address]  [phone]               Thank you for choosing us for your health care visit with Santiago Marino MD.  We are glad to serve you and happy to provide you wit Where to Get Your Medications      These medications were sent to [address], [address] ([address]), [phone], [phone]  [phone]     Phone:  [phone] Visit [facility] online at  [email or web address].tn

## (undated) NOTE — ED AVS SNAPSHOT
THE [facility] Emergency Department in [address] [facility]    Phone:  [phone]    Fax:  [phone]           Ms. Margret Velasco   MRN: [id number]    Department:  THE [facility] Emergency Department in [facility] Bring a paper prescription for each of these medications    - naproxen 500 MG Tabs              Discharge Instructions       Return to the emergency department if worse or any concerns.     Ice and elevate affected area, 20 minutes on, 20 minutes off for t primary care or specialist physician will see patients referred from the [facility] Emergency Department. Follow-up care is at the discretion of that Physician.     IF THERE IS ANY CHANGE OR WORSENING OF YOUR CONDITION, CALL YOUR PRIMARY CARE PHYSICIAN harming yourself, contact [facility] at [phone]. - If you don’t have insurance, Kaylah Rodriguez has partnered with Patient [address] to help you get signed up for insurance coverage.   Patient [place]

## (undated) NOTE — ED AVS SNAPSHOT
Ms. Alley Darnell   MRN: [id number]    Department:  [facility] Emergency Department in [place]   Date of Visit:  12/29/2018           Disclosure     Insurance plans vary and the physician(s) referred by the ER may not be covered by your plan.  Pl tell this physician (or your personal doctor if your instructions are to return to your personal doctor) about any new or lasting problems. The primary care or specialist physician will see patients referred from the [facility] Emergency Department.  Kelly Silva

## (undated) NOTE — LETTER
Date: 3/31/2020    Patient Name: May Del Cid          To Whom it may concern:      Please excuse Ricardo Ren from work from 3/31/20 - 4/7/20 due to illness.          Sincerely,    Arsenio Wilson MD

## (undated) NOTE — LETTER
Date: 8/3/2023    Patient Name: Stacey Mabry          To Whom it may concern: This letter has been written at the patient's request. The above patient was seen at the [facility] for treatment of a medical condition. This patient should be excused from attending work 8/3/23. The patient may return to work on 8/4/23 with the following limitations none.         Sincerely,    Geovany Brewster MD

## (undated) NOTE — LETTER
Date: 11/10/2021    Patient Name: Yovana Irwin          To Whom it may concern:     This letter has been written at the patient's request. The above patient was seen at one of the [facility] locations for treatment of a medical cond

## (undated) NOTE — MR AVS SNAPSHOT
[facility] [address] [place] [address] 700 [facility]  Yessenia Powers 107 [address] [phone]               Thank you for choosing us for your health care visit with Paolo Guzman MD.  We are glad to serve you and happy to provide you wit Take 1 capsule (20 mg total) by mouth daily.    Commonly known as:  PROZAC           MedroxyPROGESTERone Acetate 150 MG/ML Ai   INJECT 1 ML IM Q 3 MONTHS           Ondansetron HCl 4 mg tablet   Take 1 tablet (4 mg total) by mouth 3 (three) times daily as

## (undated) NOTE — MR AVS SNAPSHOT
[address]               Thank you for choosing us for your health care visit with herminia melchor PT. We are glad to serve you and happy to provide you with this summary of your visit.   Ple visit,  view other health information, and more. To sign up or find more information, go to [email or web address] and click on the Sign Up Now link in the [employer] box.      Enter your [employer] Activation Code exactly as it appears below along with yo

## (undated) NOTE — MR AVS SNAPSHOT
[facility] [address] [place] [address] 700 [facility]  Yessenia Powers 107 [address] [phone]               Thank you for choosing us for your health care visit with Maria T Givens MD.  We are glad to serve you and happy to provide you wit Butalbital-APAP-Caffeine -40 MG Tabs   Take 1 tablet by mouth 3 (three) times daily as needed for Pain or Headaches. Commonly known as:  FIORICET, ESGIC           FLUoxetine HCl 40 MG Caps   Take 1 capsule (40 mg total) by mouth daily.    What mccabe 2 ½ hours per week – spread out over time Use a minerva to keep you motivated   Don’t forget strength training with weights and resistance Set goals and track your progress   You don’t need to join a gym. Home exercises work great.  Put more priority on exe

## (undated) NOTE — ED AVS SNAPSHOT
Ms. Saturnino Maria   MRN: [id number]    Department:  [facility] Emergency Department in [place]   Date of Visit:  5/15/2019           Disclosure     Insurance plans vary and the physician(s) referred by the ER may not be covered by your plan.  Ple tell this physician (or your personal doctor if your instructions are to return to your personal doctor) about any new or lasting problems. The primary care or specialist physician will see patients referred from the [facility] Emergency Department.  Mohit Posada

## (undated) NOTE — MR AVS SNAPSHOT
[facility] [address] [place] [address] 700 [facility]  Yessenia Powers 107 [address] [phone]               Thank you for choosing us for your health care visit with Madison Bal MD.  We are glad to serve you and happy to provide you wit OLDER THAN 6 MONTHS OLD         1. Stop all food at least 8 hours before the planned procedure including:                     a.Solid food                    b.Candy or chewing gum                    c.Milk, milk products                    d.Orange juice a This list is accurate as of: 2/16/17 11:59 PM.  Always use your most recent med list.                azithromycin 250 MG Tabs   Take two tablets by mouth today, then one tablet daily.    Commonly known as:  ZITHROMAX Z-LANDON           ibuprofen 600 MG Tabs

## (undated) NOTE — ED AVS SNAPSHOT
Ms. Saturnino Maria   MRN: [id number]    Department:  [facility] Emergency Department in [place]   Date of Visit:  8/3/2017           Disclosure     Insurance plans vary and the physician(s) referred by the ER may not be covered by your plan.  Vitor If you have been prescribed any medication(s), please fill your prescription right away and begin taking the medication(s) as directed    If the emergency physician has read X-rays, these will be re-interpreted by a radiologist.  If there is a significant

## (undated) NOTE — MR AVS SNAPSHOT
[facility] [address] [place] [address] 700 [facility]  Yessenia Powers 107 [address] [phone]               Thank you for choosing us for your health care visit with Paolo Guzman MD.  We are glad to serve you and happy to provide you wit Take 1 tablet (50 mg total) by mouth every 2 (two) hours as needed for Migraine (with maximum of 150mg in 24 hour period. ).    Commonly known as:  IMITREX                Where to Get Your Medications      [facility]

## (undated) NOTE — MR AVS SNAPSHOT
[facility] [address] [place] [address] 700 [facility]  Yessenia Powers 107 [address] [phone]               Thank you for choosing us for your health care visit with Anastasiia Wolfe MD.  We are glad to serve you and happy to provide you wit Commonly known as:  FIORICET, ESGIC           Esomeprazole Magnesium 40 MG Cpdr   Take 1 capsule (40 mg total) by mouth daily. Commonly known as:  NEXIUM           FLUoxetine HCl 20 MG Caps   Take 1 capsule (20 mg total) by mouth daily.    Commonly known

## (undated) NOTE — LETTER
Date & Time: 5/3/2020, 2:47 AM  Patient: Sharon Solorzano  Encounter Provider(s):    Nohemi Dawson MD       To Whom It May Concern:    Sharon Solorzano was seen and treated in our department on 5/3/2020.  She should not return to work [employer]

## (undated) NOTE — ED AVS SNAPSHOT
THE [facility] Emergency Department in [address] [facility]    Phone:  [phone]    Fax:  [phone]           Ms. Pratik Flores   MRN: [id number]    Department:  THE [facility] Emergency Department in [facility] covered by your plan. Please contact your insurance company to determine coverage for follow-up care and referrals.     300 [facility] Expect [facility] [place] [phone]  Pediatric [phone] Emergency Department   [phone]       To by a radiologist.  If there is a significant change in your reading, you will be contacted. Please make sure we have your correct phone number before you leave. After you leave, you should follow the attached instructions.      I have read and understand th Kaylah 112. MyChart     Sign up for [employer], your secure online medical record. [employer] will allow you to access patient instructions from your recent visit,  view other health information, and more.  To sign up or find more information,

## (undated) NOTE — MR AVS SNAPSHOT
[facility] [address] [place] [address] 700 [facility]  Yessenia Powers 107 [address] [phone]               Thank you for choosing us for your health care visit with Pricilla Will DO.   We are glad to serve you and happy to provide you wi include:  · Inflammation of the inner ear  · Disease of the nerves to the inner ear  · Movement of calcium particles in the inner ear  · Poor blood flow to the balance centers of the brain  · Migraine headaches  Home care  · If symptoms are severe, rest qu [facility] [address]   [phone]              Allergies as of Jan 17, 2017     Antivert [Meclizine] Hives    Penicillins Itching                Today's Vital Signs     BP Pulse Temp Height Weight BMI    116/78 mmHg 76 98.8 °F (37.1 °C) (Temporal) 62\" 21

## (undated) NOTE — LETTER
April 12, 2017    Patient: Gillian Benoit   Date of Visit: 4/12/2017       To Whom It May Concern:    Gillian Benoit was seen and treated in our emergency department on 4/12/2017.  She unable to work out until cleared by her primary care phy

## (undated) NOTE — LETTER
Date: 7/19/2017    Patient Name: Harry Nielsen          To Whom it may concern: The above patient was seen at the [facility] for treatment of an acute right hand injury.     This patient should be excused from attending the gym for

## (undated) NOTE — MR AVS SNAPSHOT
[facility] [address] [place] [address] 700 [facility]  Yessenia Powers 107 [address] [phone]               Thank you for choosing us for your health care visit with Wilfrid Novak MD.  We are glad to serve you and happy to provide you wit MedroxyPROGESTERone Acetate 150 MG/ML Ai   INJECT 1 ML IM Q 3 MONTHS           Neomycin-Polymyxin-HC 3.5-83083-1 Soln   Place 3 drops into both ears 3 (three) times daily.    Commonly known as:  CORTISPORIN           Ondansetron HCl 4 mg tablet